# Patient Record
Sex: FEMALE | Race: OTHER | Employment: UNEMPLOYED | URBAN - NONMETROPOLITAN AREA
[De-identification: names, ages, dates, MRNs, and addresses within clinical notes are randomized per-mention and may not be internally consistent; named-entity substitution may affect disease eponyms.]

---

## 2024-09-09 ENCOUNTER — HOSPITAL ENCOUNTER (OUTPATIENT)
Age: 34
Setting detail: OBSERVATION
Discharge: HOME | End: 2024-09-13
Attending: STUDENT IN AN ORGANIZED HEALTH CARE EDUCATION/TRAINING PROGRAM | Admitting: STUDENT IN AN ORGANIZED HEALTH CARE EDUCATION/TRAINING PROGRAM
Payer: COMMERCIAL

## 2024-09-09 DIAGNOSIS — K85.90 RECURRENT ACUTE PANCREATITIS: Primary | ICD-10-CM

## 2024-09-09 DIAGNOSIS — K11.20 PAROTITIS NOT DUE TO MUMPS: ICD-10-CM

## 2024-09-09 ASSESSMENT — PAIN DESCRIPTION - ONSET: ONSET: ON-GOING

## 2024-09-09 ASSESSMENT — PAIN DESCRIPTION - FREQUENCY: FREQUENCY: CONTINUOUS

## 2024-09-09 ASSESSMENT — PAIN SCALES - GENERAL: PAINLEVEL_OUTOF10: 8

## 2024-09-09 ASSESSMENT — PAIN - FUNCTIONAL ASSESSMENT: PAIN_FUNCTIONAL_ASSESSMENT: ACTIVITIES ARE NOT PREVENTED

## 2024-09-09 ASSESSMENT — PAIN DESCRIPTION - PAIN TYPE: TYPE: ACUTE PAIN

## 2024-09-09 ASSESSMENT — PAIN DESCRIPTION - LOCATION: LOCATION: ABDOMEN

## 2024-09-09 ASSESSMENT — PAIN DESCRIPTION - DESCRIPTORS: DESCRIPTORS: ACHING;SHARP

## 2024-09-09 ASSESSMENT — PAIN DESCRIPTION - ORIENTATION: ORIENTATION: LEFT;UPPER;ANTERIOR;POSTERIOR

## 2024-09-10 ENCOUNTER — APPOINTMENT (OUTPATIENT)
Dept: ULTRASOUND IMAGING | Age: 34
End: 2024-09-10
Attending: STUDENT IN AN ORGANIZED HEALTH CARE EDUCATION/TRAINING PROGRAM
Payer: COMMERCIAL

## 2024-09-10 ENCOUNTER — APPOINTMENT (OUTPATIENT)
Dept: MRI IMAGING | Age: 34
End: 2024-09-10
Attending: STUDENT IN AN ORGANIZED HEALTH CARE EDUCATION/TRAINING PROGRAM
Payer: COMMERCIAL

## 2024-09-10 LAB
ALBUMIN SERPL BCG-MCNC: 3.5 G/DL (ref 3.5–5.1)
ALP SERPL-CCNC: 95 U/L (ref 38–126)
ALT SERPL W/O P-5'-P-CCNC: 17 U/L (ref 11–66)
AMPHETAMINES UR QL SCN: NEGATIVE
ANION GAP SERPL CALC-SCNC: 14 MEQ/L (ref 8–16)
AST SERPL-CCNC: 17 U/L (ref 5–40)
BARBITURATES UR QL SCN: NEGATIVE
BENZODIAZ UR QL SCN: NEGATIVE
BILIRUB SERPL-MCNC: 0.6 MG/DL (ref 0.3–1.2)
BILIRUB UR QL STRIP.AUTO: NEGATIVE
BUN SERPL-MCNC: 7 MG/DL (ref 7–22)
BZE UR QL SCN: NEGATIVE
CALCIUM SERPL-MCNC: 8.5 MG/DL (ref 8.5–10.5)
CANNABINOIDS UR QL SCN: NEGATIVE
CHARACTER UR: CLEAR
CHLORIDE SERPL-SCNC: 108 MEQ/L (ref 98–111)
CO2 SERPL-SCNC: 20 MEQ/L (ref 23–33)
COLOR, UA: YELLOW
CREAT SERPL-MCNC: 0.6 MG/DL (ref 0.4–1.2)
DEPRECATED RDW RBC AUTO: 44.9 FL (ref 35–45)
ERYTHROCYTE [DISTWIDTH] IN BLOOD BY AUTOMATED COUNT: 14.1 % (ref 11.5–14.5)
FENTANYL: NEGATIVE
GFR SERPL CREATININE-BSD FRML MDRD: > 90 ML/MIN/1.73M2
GLUCOSE SERPL-MCNC: 76 MG/DL (ref 70–108)
GLUCOSE UR QL STRIP.AUTO: NEGATIVE MG/DL
HCT VFR BLD AUTO: 33.1 % (ref 37–47)
HGB BLD-MCNC: 10.4 GM/DL (ref 12–16)
HGB UR QL STRIP.AUTO: NEGATIVE
KETONES UR QL STRIP.AUTO: 15
LIPASE SERPL-CCNC: 12.8 U/L (ref 5.6–51.3)
MAGNESIUM SERPL-MCNC: 1.8 MG/DL (ref 1.6–2.4)
MCH RBC QN AUTO: 27.7 PG (ref 26–33)
MCHC RBC AUTO-ENTMCNC: 31.4 GM/DL (ref 32.2–35.5)
MCV RBC AUTO: 88.3 FL (ref 81–99)
NITRITE UR QL STRIP: NEGATIVE
OPIATES UR QL SCN: POSITIVE
OXYCODONE: NEGATIVE
PCP UR QL SCN: NEGATIVE
PH UR STRIP.AUTO: 5 [PH] (ref 5–9)
PLATELET # BLD AUTO: 150 THOU/MM3 (ref 130–400)
PMV BLD AUTO: 12.7 FL (ref 9.4–12.4)
POTASSIUM SERPL-SCNC: 3.5 MEQ/L (ref 3.5–5.2)
PROT SERPL-MCNC: 6 G/DL (ref 6.1–8)
PROT UR STRIP.AUTO-MCNC: NEGATIVE MG/DL
RBC # BLD AUTO: 3.75 MILL/MM3 (ref 4.2–5.4)
SODIUM SERPL-SCNC: 142 MEQ/L (ref 135–145)
SP GR UR REFRACT.AUTO: 1.02 (ref 1–1.03)
UROBILINOGEN, URINE: 0.2 EU/DL (ref 0–1)
WBC # BLD AUTO: 3.2 THOU/MM3 (ref 4.8–10.8)
WBC #/AREA URNS HPF: NEGATIVE /[HPF]

## 2024-09-10 PROCEDURE — 6360000002 HC RX W HCPCS

## 2024-09-10 PROCEDURE — 2580000003 HC RX 258: Performed by: NURSE PRACTITIONER

## 2024-09-10 PROCEDURE — C1751 CATH, INF, PER/CENT/MIDLINE: HCPCS

## 2024-09-10 PROCEDURE — 2500000003 HC RX 250 WO HCPCS

## 2024-09-10 PROCEDURE — 80053 COMPREHEN METABOLIC PANEL: CPT

## 2024-09-10 PROCEDURE — A9579 GAD-BASE MR CONTRAST NOS,1ML: HCPCS | Performed by: NURSE PRACTITIONER

## 2024-09-10 PROCEDURE — 96375 TX/PRO/DX INJ NEW DRUG ADDON: CPT

## 2024-09-10 PROCEDURE — 96367 TX/PROPH/DG ADDL SEQ IV INF: CPT

## 2024-09-10 PROCEDURE — 83735 ASSAY OF MAGNESIUM: CPT

## 2024-09-10 PROCEDURE — 99222 1ST HOSP IP/OBS MODERATE 55: CPT

## 2024-09-10 PROCEDURE — 2580000003 HC RX 258: Performed by: PHYSICIAN ASSISTANT

## 2024-09-10 PROCEDURE — 74183 MRI ABD W/O CNTR FLWD CNTR: CPT

## 2024-09-10 PROCEDURE — 36415 COLL VENOUS BLD VENIPUNCTURE: CPT

## 2024-09-10 PROCEDURE — 96365 THER/PROPH/DIAG IV INF INIT: CPT

## 2024-09-10 PROCEDURE — 81003 URINALYSIS AUTO W/O SCOPE: CPT

## 2024-09-10 PROCEDURE — G0378 HOSPITAL OBSERVATION PER HR: HCPCS

## 2024-09-10 PROCEDURE — 96366 THER/PROPH/DIAG IV INF ADDON: CPT

## 2024-09-10 PROCEDURE — 80307 DRUG TEST PRSMV CHEM ANLYZR: CPT

## 2024-09-10 PROCEDURE — 6360000002 HC RX W HCPCS: Performed by: PHYSICIAN ASSISTANT

## 2024-09-10 PROCEDURE — 6360000002 HC RX W HCPCS: Performed by: NURSE PRACTITIONER

## 2024-09-10 PROCEDURE — 36410 VNPNXR 3YR/> PHY/QHP DX/THER: CPT

## 2024-09-10 PROCEDURE — 96376 TX/PRO/DX INJ SAME DRUG ADON: CPT

## 2024-09-10 PROCEDURE — 96361 HYDRATE IV INFUSION ADD-ON: CPT

## 2024-09-10 PROCEDURE — 85027 COMPLETE CBC AUTOMATED: CPT

## 2024-09-10 PROCEDURE — 6360000004 HC RX CONTRAST MEDICATION: Performed by: NURSE PRACTITIONER

## 2024-09-10 PROCEDURE — 83690 ASSAY OF LIPASE: CPT

## 2024-09-10 PROCEDURE — 76937 US GUIDE VASCULAR ACCESS: CPT

## 2024-09-10 PROCEDURE — 2580000003 HC RX 258

## 2024-09-10 PROCEDURE — 76770 US EXAM ABDO BACK WALL COMP: CPT

## 2024-09-10 RX ORDER — MORPHINE SULFATE 2 MG/ML
2 INJECTION, SOLUTION INTRAMUSCULAR; INTRAVENOUS
Status: DISCONTINUED | OUTPATIENT
Start: 2024-09-10 | End: 2024-09-11

## 2024-09-10 RX ORDER — LOPERAMIDE HCL 2 MG
2-4 CAPSULE ORAL AS NEEDED
Status: ON HOLD | COMMUNITY

## 2024-09-10 RX ORDER — ENOXAPARIN SODIUM 100 MG/ML
40 INJECTION SUBCUTANEOUS DAILY
Status: DISCONTINUED | OUTPATIENT
Start: 2024-09-10 | End: 2024-09-10

## 2024-09-10 RX ORDER — MAGNESIUM SULFATE IN WATER 40 MG/ML
2000 INJECTION, SOLUTION INTRAVENOUS PRN
Status: DISCONTINUED | OUTPATIENT
Start: 2024-09-10 | End: 2024-09-13 | Stop reason: HOSPADM

## 2024-09-10 RX ORDER — MORPHINE SULFATE 4 MG/ML
4 INJECTION, SOLUTION INTRAMUSCULAR; INTRAVENOUS
Status: DISCONTINUED | OUTPATIENT
Start: 2024-09-10 | End: 2024-09-11

## 2024-09-10 RX ORDER — MORPHINE SULFATE 2 MG/ML
2 INJECTION, SOLUTION INTRAMUSCULAR; INTRAVENOUS
Status: DISCONTINUED | OUTPATIENT
Start: 2024-09-10 | End: 2024-09-10

## 2024-09-10 RX ORDER — SODIUM CHLORIDE 9 MG/ML
INJECTION, SOLUTION INTRAVENOUS PRN
Status: DISCONTINUED | OUTPATIENT
Start: 2024-09-10 | End: 2024-09-13 | Stop reason: HOSPADM

## 2024-09-10 RX ORDER — PANTOPRAZOLE SODIUM 40 MG/1
40 TABLET, DELAYED RELEASE ORAL DAILY
Status: ON HOLD | COMMUNITY
Start: 2024-06-22

## 2024-09-10 RX ORDER — CIPROFLOXACIN 750 MG/1
750 TABLET, FILM COATED ORAL 2 TIMES DAILY
Status: ON HOLD | COMMUNITY
End: 2024-09-13 | Stop reason: HOSPADM

## 2024-09-10 RX ORDER — POTASSIUM CHLORIDE 1500 MG/1
40 TABLET, EXTENDED RELEASE ORAL PRN
Status: DISCONTINUED | OUTPATIENT
Start: 2024-09-10 | End: 2024-09-13 | Stop reason: HOSPADM

## 2024-09-10 RX ORDER — DIPHENHYDRAMINE HYDROCHLORIDE 50 MG/ML
50 INJECTION INTRAMUSCULAR; INTRAVENOUS EVERY 6 HOURS PRN
Status: DISCONTINUED | OUTPATIENT
Start: 2024-09-10 | End: 2024-09-13 | Stop reason: HOSPADM

## 2024-09-10 RX ORDER — SODIUM CHLORIDE 0.9 % (FLUSH) 0.9 %
5-40 SYRINGE (ML) INJECTION EVERY 12 HOURS SCHEDULED
Status: DISCONTINUED | OUTPATIENT
Start: 2024-09-10 | End: 2024-09-13 | Stop reason: HOSPADM

## 2024-09-10 RX ORDER — DIPHENHYDRAMINE HYDROCHLORIDE 50 MG/ML
25 INJECTION INTRAMUSCULAR; INTRAVENOUS ONCE
Status: COMPLETED | OUTPATIENT
Start: 2024-09-10 | End: 2024-09-10

## 2024-09-10 RX ORDER — SODIUM CHLORIDE, SODIUM LACTATE, POTASSIUM CHLORIDE, CALCIUM CHLORIDE 600; 310; 30; 20 MG/100ML; MG/100ML; MG/100ML; MG/100ML
INJECTION, SOLUTION INTRAVENOUS CONTINUOUS
Status: DISCONTINUED | OUTPATIENT
Start: 2024-09-10 | End: 2024-09-13 | Stop reason: HOSPADM

## 2024-09-10 RX ORDER — MORPHINE SULFATE 4 MG/ML
4 INJECTION, SOLUTION INTRAMUSCULAR; INTRAVENOUS
Status: DISCONTINUED | OUTPATIENT
Start: 2024-09-10 | End: 2024-09-10

## 2024-09-10 RX ORDER — FAMOTIDINE 20 MG/1
20 TABLET, FILM COATED ORAL 2 TIMES DAILY
COMMUNITY
Start: 2024-07-01 | End: 2024-09-18

## 2024-09-10 RX ORDER — LORAZEPAM 2 MG/ML
1 INJECTION INTRAMUSCULAR
Status: COMPLETED | OUTPATIENT
Start: 2024-09-10 | End: 2024-09-10

## 2024-09-10 RX ORDER — DIPHENHYDRAMINE HYDROCHLORIDE 50 MG/ML
50 INJECTION INTRAMUSCULAR; INTRAVENOUS ONCE
Status: COMPLETED | OUTPATIENT
Start: 2024-09-10 | End: 2024-09-10

## 2024-09-10 RX ORDER — SODIUM CHLORIDE 0.9 % (FLUSH) 0.9 %
5-40 SYRINGE (ML) INJECTION PRN
Status: DISCONTINUED | OUTPATIENT
Start: 2024-09-10 | End: 2024-09-11 | Stop reason: HOSPADM

## 2024-09-10 RX ORDER — ONDANSETRON 4 MG/1
4 TABLET, ORALLY DISINTEGRATING ORAL EVERY 8 HOURS PRN
Status: DISCONTINUED | OUTPATIENT
Start: 2024-09-10 | End: 2024-09-13 | Stop reason: HOSPADM

## 2024-09-10 RX ORDER — ACETAMINOPHEN 650 MG/1
650 SUPPOSITORY RECTAL EVERY 6 HOURS PRN
Status: DISCONTINUED | OUTPATIENT
Start: 2024-09-10 | End: 2024-09-13 | Stop reason: HOSPADM

## 2024-09-10 RX ORDER — PANTOPRAZOLE SODIUM 40 MG/10ML
40 INJECTION, POWDER, LYOPHILIZED, FOR SOLUTION INTRAVENOUS 2 TIMES DAILY
Status: DISCONTINUED | OUTPATIENT
Start: 2024-09-10 | End: 2024-09-10

## 2024-09-10 RX ORDER — ACETAMINOPHEN 325 MG/1
650 TABLET ORAL EVERY 6 HOURS PRN
Status: DISCONTINUED | OUTPATIENT
Start: 2024-09-10 | End: 2024-09-13 | Stop reason: HOSPADM

## 2024-09-10 RX ORDER — POTASSIUM CHLORIDE 7.45 MG/ML
10 INJECTION INTRAVENOUS PRN
Status: DISCONTINUED | OUTPATIENT
Start: 2024-09-10 | End: 2024-09-13 | Stop reason: HOSPADM

## 2024-09-10 RX ORDER — SODIUM CHLORIDE 0.9 % (FLUSH) 0.9 %
5-40 SYRINGE (ML) INJECTION PRN
Status: DISCONTINUED | OUTPATIENT
Start: 2024-09-10 | End: 2024-09-13 | Stop reason: HOSPADM

## 2024-09-10 RX ORDER — POLYETHYLENE GLYCOL 3350 17 G/17G
17 POWDER, FOR SOLUTION ORAL DAILY PRN
Status: DISCONTINUED | OUTPATIENT
Start: 2024-09-10 | End: 2024-09-13 | Stop reason: HOSPADM

## 2024-09-10 RX ORDER — ONDANSETRON 2 MG/ML
4 INJECTION INTRAMUSCULAR; INTRAVENOUS EVERY 6 HOURS PRN
Status: DISCONTINUED | OUTPATIENT
Start: 2024-09-10 | End: 2024-09-13 | Stop reason: HOSPADM

## 2024-09-10 RX ADMIN — DIPHENHYDRAMINE HYDROCHLORIDE 50 MG: 50 INJECTION INTRAMUSCULAR; INTRAVENOUS at 02:37

## 2024-09-10 RX ADMIN — MORPHINE SULFATE 4 MG: 4 INJECTION, SOLUTION INTRAMUSCULAR; INTRAVENOUS at 21:24

## 2024-09-10 RX ADMIN — DIPHENHYDRAMINE HYDROCHLORIDE 50 MG: 50 INJECTION INTRAMUSCULAR; INTRAVENOUS at 20:04

## 2024-09-10 RX ADMIN — DIPHENHYDRAMINE HYDROCHLORIDE 50 MG: 50 INJECTION INTRAMUSCULAR; INTRAVENOUS at 13:36

## 2024-09-10 RX ADMIN — SODIUM CHLORIDE, POTASSIUM CHLORIDE, SODIUM LACTATE AND CALCIUM CHLORIDE: 600; 310; 30; 20 INJECTION, SOLUTION INTRAVENOUS at 10:16

## 2024-09-10 RX ADMIN — DIPHENHYDRAMINE HYDROCHLORIDE 25 MG: 50 INJECTION INTRAMUSCULAR; INTRAVENOUS at 06:51

## 2024-09-10 RX ADMIN — ONDANSETRON 4 MG: 2 INJECTION INTRAMUSCULAR; INTRAVENOUS at 19:59

## 2024-09-10 RX ADMIN — SODIUM CHLORIDE, POTASSIUM CHLORIDE, SODIUM LACTATE AND CALCIUM CHLORIDE: 600; 310; 30; 20 INJECTION, SOLUTION INTRAVENOUS at 23:32

## 2024-09-10 RX ADMIN — PANTOPRAZOLE SODIUM 40 MG: 40 INJECTION, POWDER, FOR SOLUTION INTRAVENOUS at 09:14

## 2024-09-10 RX ADMIN — SODIUM CHLORIDE, PRESERVATIVE FREE 20 MG: 5 INJECTION INTRAVENOUS at 09:14

## 2024-09-10 RX ADMIN — GADOTERIDOL 15 ML: 279.3 INJECTION, SOLUTION INTRAVENOUS at 13:04

## 2024-09-10 RX ADMIN — SODIUM CHLORIDE, POTASSIUM CHLORIDE, SODIUM LACTATE AND CALCIUM CHLORIDE: 600; 310; 30; 20 INJECTION, SOLUTION INTRAVENOUS at 03:38

## 2024-09-10 RX ADMIN — CEFTRIAXONE SODIUM 1000 MG: 1 INJECTION, POWDER, FOR SOLUTION INTRAMUSCULAR; INTRAVENOUS at 02:55

## 2024-09-10 RX ADMIN — MORPHINE SULFATE 2 MG: 2 INJECTION, SOLUTION INTRAMUSCULAR; INTRAVENOUS at 09:14

## 2024-09-10 RX ADMIN — PANTOPRAZOLE SODIUM 8 MG/HR: 40 INJECTION, POWDER, FOR SOLUTION INTRAVENOUS at 23:36

## 2024-09-10 RX ADMIN — SODIUM CHLORIDE, POTASSIUM CHLORIDE, SODIUM LACTATE AND CALCIUM CHLORIDE: 600; 310; 30; 20 INJECTION, SOLUTION INTRAVENOUS at 19:30

## 2024-09-10 RX ADMIN — PANTOPRAZOLE SODIUM 40 MG: 40 INJECTION, POWDER, FOR SOLUTION INTRAVENOUS at 02:47

## 2024-09-10 RX ADMIN — LORAZEPAM 1 MG: 2 INJECTION INTRAMUSCULAR; INTRAVENOUS at 11:57

## 2024-09-10 RX ADMIN — MORPHINE SULFATE 4 MG: 4 INJECTION, SOLUTION INTRAMUSCULAR; INTRAVENOUS at 02:43

## 2024-09-10 RX ADMIN — MORPHINE SULFATE 4 MG: 4 INJECTION, SOLUTION INTRAMUSCULAR; INTRAVENOUS at 06:05

## 2024-09-10 RX ADMIN — ONDANSETRON 4 MG: 2 INJECTION INTRAMUSCULAR; INTRAVENOUS at 13:36

## 2024-09-10 RX ADMIN — MORPHINE SULFATE 4 MG: 4 INJECTION, SOLUTION INTRAMUSCULAR; INTRAVENOUS at 18:06

## 2024-09-10 RX ADMIN — SODIUM CHLORIDE, POTASSIUM CHLORIDE, SODIUM LACTATE AND CALCIUM CHLORIDE: 600; 310; 30; 20 INJECTION, SOLUTION INTRAVENOUS at 15:21

## 2024-09-10 RX ADMIN — PANTOPRAZOLE SODIUM 8 MG/HR: 40 INJECTION, POWDER, FOR SOLUTION INTRAVENOUS at 10:33

## 2024-09-10 RX ADMIN — MORPHINE SULFATE 4 MG: 4 INJECTION, SOLUTION INTRAMUSCULAR; INTRAVENOUS at 15:19

## 2024-09-10 ASSESSMENT — PAIN DESCRIPTION - FREQUENCY
FREQUENCY: INTERMITTENT
FREQUENCY: CONTINUOUS
FREQUENCY: INTERMITTENT
FREQUENCY: CONTINUOUS

## 2024-09-10 ASSESSMENT — PAIN DESCRIPTION - ORIENTATION
ORIENTATION: RIGHT;ANTERIOR;POSTERIOR;UPPER
ORIENTATION: RIGHT;UPPER;ANTERIOR;POSTERIOR
ORIENTATION: MID;UPPER
ORIENTATION: RIGHT;ANTERIOR;POSTERIOR;UPPER

## 2024-09-10 ASSESSMENT — ENCOUNTER SYMPTOMS
PHOTOPHOBIA: 0
TROUBLE SWALLOWING: 0
VOMITING: 1
COUGH: 0
BLOOD IN STOOL: 0
SHORTNESS OF BREATH: 0
NAUSEA: 1
ABDOMINAL PAIN: 1
BACK PAIN: 0

## 2024-09-10 ASSESSMENT — PAIN DESCRIPTION - DESCRIPTORS
DESCRIPTORS: ACHING;SHARP
DESCRIPTORS: ACHING;STABBING
DESCRIPTORS: SHARP
DESCRIPTORS: ACHING;SHARP
DESCRIPTORS: SHARP

## 2024-09-10 ASSESSMENT — PAIN DESCRIPTION - LOCATION
LOCATION: ABDOMEN

## 2024-09-10 ASSESSMENT — PAIN DESCRIPTION - PAIN TYPE
TYPE: ACUTE PAIN

## 2024-09-10 ASSESSMENT — PAIN SCALES - GENERAL
PAINLEVEL_OUTOF10: 8
PAINLEVEL_OUTOF10: 7
PAINLEVEL_OUTOF10: 5
PAINLEVEL_OUTOF10: 8
PAINLEVEL_OUTOF10: 8
PAINLEVEL_OUTOF10: 6
PAINLEVEL_OUTOF10: 8
PAINLEVEL_OUTOF10: 8
PAINLEVEL_OUTOF10: 9
PAINLEVEL_OUTOF10: 5
PAINLEVEL_OUTOF10: 9
PAINLEVEL_OUTOF10: 6
PAINLEVEL_OUTOF10: 8
PAINLEVEL_OUTOF10: 6

## 2024-09-10 ASSESSMENT — PAIN DESCRIPTION - ONSET
ONSET: ON-GOING

## 2024-09-10 ASSESSMENT — PAIN - FUNCTIONAL ASSESSMENT
PAIN_FUNCTIONAL_ASSESSMENT: ACTIVITIES ARE NOT PREVENTED

## 2024-09-10 ASSESSMENT — LIFESTYLE VARIABLES
HOW OFTEN DO YOU HAVE A DRINK CONTAINING ALCOHOL: NEVER
HOW MANY STANDARD DRINKS CONTAINING ALCOHOL DO YOU HAVE ON A TYPICAL DAY: PATIENT DOES NOT DRINK

## 2024-09-11 ENCOUNTER — ANESTHESIA (OUTPATIENT)
Dept: ENDOSCOPY | Age: 34
End: 2024-09-11
Payer: COMMERCIAL

## 2024-09-11 ENCOUNTER — ANESTHESIA EVENT (OUTPATIENT)
Dept: ENDOSCOPY | Age: 34
End: 2024-09-11
Payer: COMMERCIAL

## 2024-09-11 LAB
ALBUMIN SERPL BCG-MCNC: 3.5 G/DL (ref 3.5–5.1)
ALP SERPL-CCNC: 101 U/L (ref 38–126)
ALT SERPL W/O P-5'-P-CCNC: 17 U/L (ref 11–66)
ANION GAP SERPL CALC-SCNC: 12 MEQ/L (ref 8–16)
AST SERPL-CCNC: 18 U/L (ref 5–40)
BASOPHILS ABSOLUTE: 0 THOU/MM3 (ref 0–0.1)
BASOPHILS NFR BLD AUTO: 0.6 %
BILIRUB SERPL-MCNC: 0.6 MG/DL (ref 0.3–1.2)
BUN SERPL-MCNC: 5 MG/DL (ref 7–22)
CALCIUM SERPL-MCNC: 8.6 MG/DL (ref 8.5–10.5)
CHLORIDE SERPL-SCNC: 105 MEQ/L (ref 98–111)
CO2 SERPL-SCNC: 24 MEQ/L (ref 23–33)
CREAT SERPL-MCNC: 0.6 MG/DL (ref 0.4–1.2)
DEPRECATED RDW RBC AUTO: 42.3 FL (ref 35–45)
EOSINOPHIL NFR BLD AUTO: 1.9 %
EOSINOPHILS ABSOLUTE: 0.1 THOU/MM3 (ref 0–0.4)
ERYTHROCYTE [DISTWIDTH] IN BLOOD BY AUTOMATED COUNT: 13.8 % (ref 11.5–14.5)
GFR SERPL CREATININE-BSD FRML MDRD: > 90 ML/MIN/1.73M2
GLUCOSE SERPL-MCNC: 73 MG/DL (ref 70–108)
HCT VFR BLD AUTO: 31.1 % (ref 37–47)
HGB BLD-MCNC: 10.1 GM/DL (ref 12–16)
IMM GRANULOCYTES # BLD AUTO: 0 THOU/MM3 (ref 0–0.07)
IMM GRANULOCYTES NFR BLD AUTO: 0 %
LYMPHOCYTES ABSOLUTE: 1.5 THOU/MM3 (ref 1–4.8)
LYMPHOCYTES NFR BLD AUTO: 46.8 %
MCH RBC QN AUTO: 27.6 PG (ref 26–33)
MCHC RBC AUTO-ENTMCNC: 32.5 GM/DL (ref 32.2–35.5)
MCV RBC AUTO: 85 FL (ref 81–99)
MONOCYTES ABSOLUTE: 0.3 THOU/MM3 (ref 0.4–1.3)
MONOCYTES NFR BLD AUTO: 10.1 %
NEUTROPHILS ABSOLUTE: 1.3 THOU/MM3 (ref 1.8–7.7)
NEUTROPHILS NFR BLD AUTO: 40.6 %
NRBC BLD AUTO-RTO: 0 /100 WBC
PLATELET # BLD AUTO: 146 THOU/MM3 (ref 130–400)
PMV BLD AUTO: 12.4 FL (ref 9.4–12.4)
POTASSIUM SERPL-SCNC: 3.9 MEQ/L (ref 3.5–5.2)
PREGNANCY, URINE: NEGATIVE
PROT SERPL-MCNC: 5.9 G/DL (ref 6.1–8)
RBC # BLD AUTO: 3.66 MILL/MM3 (ref 4.2–5.4)
SODIUM SERPL-SCNC: 141 MEQ/L (ref 135–145)
WBC # BLD AUTO: 3.1 THOU/MM3 (ref 4.8–10.8)

## 2024-09-11 PROCEDURE — 2500000003 HC RX 250 WO HCPCS

## 2024-09-11 PROCEDURE — 6360000002 HC RX W HCPCS: Performed by: PHYSICIAN ASSISTANT

## 2024-09-11 PROCEDURE — 2580000003 HC RX 258

## 2024-09-11 PROCEDURE — 2580000003 HC RX 258: Performed by: NURSE PRACTITIONER

## 2024-09-11 PROCEDURE — 80053 COMPREHEN METABOLIC PANEL: CPT

## 2024-09-11 PROCEDURE — 96376 TX/PRO/DX INJ SAME DRUG ADON: CPT

## 2024-09-11 PROCEDURE — 96368 THER/DIAG CONCURRENT INF: CPT

## 2024-09-11 PROCEDURE — 3700000001 HC ADD 15 MINUTES (ANESTHESIA): Performed by: INTERNAL MEDICINE

## 2024-09-11 PROCEDURE — 3700000000 HC ANESTHESIA ATTENDED CARE: Performed by: INTERNAL MEDICINE

## 2024-09-11 PROCEDURE — G0378 HOSPITAL OBSERVATION PER HR: HCPCS

## 2024-09-11 PROCEDURE — 6360000002 HC RX W HCPCS

## 2024-09-11 PROCEDURE — 85025 COMPLETE CBC W/AUTO DIFF WBC: CPT

## 2024-09-11 PROCEDURE — 7100000010 HC PHASE II RECOVERY - FIRST 15 MIN: Performed by: INTERNAL MEDICINE

## 2024-09-11 PROCEDURE — 3609012400 HC EGD TRANSORAL BIOPSY SINGLE/MULTIPLE: Performed by: INTERNAL MEDICINE

## 2024-09-11 PROCEDURE — 7100000011 HC PHASE II RECOVERY - ADDTL 15 MIN: Performed by: INTERNAL MEDICINE

## 2024-09-11 PROCEDURE — 6360000002 HC RX W HCPCS: Performed by: NURSE PRACTITIONER

## 2024-09-11 PROCEDURE — 99233 SBSQ HOSP IP/OBS HIGH 50: CPT | Performed by: PHYSICIAN ASSISTANT

## 2024-09-11 PROCEDURE — 6360000002 HC RX W HCPCS: Performed by: NURSE ANESTHETIST, CERTIFIED REGISTERED

## 2024-09-11 PROCEDURE — 2580000003 HC RX 258: Performed by: PHYSICIAN ASSISTANT

## 2024-09-11 PROCEDURE — 81025 URINE PREGNANCY TEST: CPT

## 2024-09-11 PROCEDURE — 88305 TISSUE EXAM BY PATHOLOGIST: CPT

## 2024-09-11 PROCEDURE — 96366 THER/PROPH/DIAG IV INF ADDON: CPT

## 2024-09-11 PROCEDURE — 36415 COLL VENOUS BLD VENIPUNCTURE: CPT

## 2024-09-11 RX ORDER — LIDOCAINE HYDROCHLORIDE 20 MG/ML
INJECTION, SOLUTION INTRAVENOUS PRN
Status: DISCONTINUED | OUTPATIENT
Start: 2024-09-11 | End: 2024-09-11 | Stop reason: SDUPTHER

## 2024-09-11 RX ORDER — CIPROFLOXACIN 2 MG/ML
400 INJECTION, SOLUTION INTRAVENOUS ONCE
Status: DISCONTINUED | OUTPATIENT
Start: 2024-09-12 | End: 2024-09-12

## 2024-09-11 RX ORDER — PROPOFOL 10 MG/ML
INJECTION, EMULSION INTRAVENOUS PRN
Status: DISCONTINUED | OUTPATIENT
Start: 2024-09-11 | End: 2024-09-11 | Stop reason: SDUPTHER

## 2024-09-11 RX ORDER — MORPHINE SULFATE 2 MG/ML
2 INJECTION, SOLUTION INTRAMUSCULAR; INTRAVENOUS
Status: DISCONTINUED | OUTPATIENT
Start: 2024-09-11 | End: 2024-09-13 | Stop reason: HOSPADM

## 2024-09-11 RX ORDER — SODIUM CHLORIDE 0.9 % (FLUSH) 0.9 %
5-40 SYRINGE (ML) INJECTION PRN
Status: DISCONTINUED | OUTPATIENT
Start: 2024-09-11 | End: 2024-09-12 | Stop reason: HOSPADM

## 2024-09-11 RX ORDER — METRONIDAZOLE 500 MG/100ML
500 INJECTION, SOLUTION INTRAVENOUS ONCE
Status: COMPLETED | OUTPATIENT
Start: 2024-09-12 | End: 2024-09-12

## 2024-09-11 RX ORDER — MORPHINE SULFATE 2 MG/ML
1 INJECTION, SOLUTION INTRAMUSCULAR; INTRAVENOUS
Status: DISCONTINUED | OUTPATIENT
Start: 2024-09-11 | End: 2024-09-13 | Stop reason: HOSPADM

## 2024-09-11 RX ADMIN — SODIUM CHLORIDE, POTASSIUM CHLORIDE, SODIUM LACTATE AND CALCIUM CHLORIDE: 600; 310; 30; 20 INJECTION, SOLUTION INTRAVENOUS at 03:57

## 2024-09-11 RX ADMIN — SODIUM CHLORIDE, POTASSIUM CHLORIDE, SODIUM LACTATE AND CALCIUM CHLORIDE: 600; 310; 30; 20 INJECTION, SOLUTION INTRAVENOUS at 12:52

## 2024-09-11 RX ADMIN — MORPHINE SULFATE 2 MG: 2 INJECTION, SOLUTION INTRAMUSCULAR; INTRAVENOUS at 15:57

## 2024-09-11 RX ADMIN — MORPHINE SULFATE 4 MG: 4 INJECTION, SOLUTION INTRAMUSCULAR; INTRAVENOUS at 00:34

## 2024-09-11 RX ADMIN — PANTOPRAZOLE SODIUM 8 MG/HR: 40 INJECTION, POWDER, FOR SOLUTION INTRAVENOUS at 09:21

## 2024-09-11 RX ADMIN — SODIUM CHLORIDE, PRESERVATIVE FREE 10 ML: 5 INJECTION INTRAVENOUS at 08:00

## 2024-09-11 RX ADMIN — SODIUM CHLORIDE, PRESERVATIVE FREE 20 MG: 5 INJECTION INTRAVENOUS at 07:56

## 2024-09-11 RX ADMIN — MORPHINE SULFATE 4 MG: 4 INJECTION, SOLUTION INTRAMUSCULAR; INTRAVENOUS at 06:51

## 2024-09-11 RX ADMIN — SODIUM CHLORIDE, POTASSIUM CHLORIDE, SODIUM LACTATE AND CALCIUM CHLORIDE: 600; 310; 30; 20 INJECTION, SOLUTION INTRAVENOUS at 17:48

## 2024-09-11 RX ADMIN — CEFTRIAXONE SODIUM 1000 MG: 1 INJECTION, POWDER, FOR SOLUTION INTRAMUSCULAR; INTRAVENOUS at 03:04

## 2024-09-11 RX ADMIN — SODIUM CHLORIDE, POTASSIUM CHLORIDE, SODIUM LACTATE AND CALCIUM CHLORIDE: 600; 310; 30; 20 INJECTION, SOLUTION INTRAVENOUS at 07:55

## 2024-09-11 RX ADMIN — MORPHINE SULFATE 4 MG: 4 INJECTION, SOLUTION INTRAMUSCULAR; INTRAVENOUS at 03:41

## 2024-09-11 RX ADMIN — DIPHENHYDRAMINE HYDROCHLORIDE 50 MG: 50 INJECTION INTRAMUSCULAR; INTRAVENOUS at 02:05

## 2024-09-11 RX ADMIN — MORPHINE SULFATE 2 MG: 2 INJECTION, SOLUTION INTRAMUSCULAR; INTRAVENOUS at 18:52

## 2024-09-11 RX ADMIN — LIDOCAINE HYDROCHLORIDE 100 MG: 20 INJECTION, SOLUTION INTRAVENOUS at 11:02

## 2024-09-11 RX ADMIN — MORPHINE SULFATE 2 MG: 2 INJECTION, SOLUTION INTRAMUSCULAR; INTRAVENOUS at 09:55

## 2024-09-11 RX ADMIN — DIPHENHYDRAMINE HYDROCHLORIDE 50 MG: 50 INJECTION INTRAMUSCULAR; INTRAVENOUS at 20:03

## 2024-09-11 RX ADMIN — DIPHENHYDRAMINE HYDROCHLORIDE 50 MG: 50 INJECTION INTRAMUSCULAR; INTRAVENOUS at 07:59

## 2024-09-11 RX ADMIN — MORPHINE SULFATE 2 MG: 2 INJECTION, SOLUTION INTRAMUSCULAR; INTRAVENOUS at 22:01

## 2024-09-11 RX ADMIN — PROPOFOL 20 MG: 10 INJECTION, EMULSION INTRAVENOUS at 11:04

## 2024-09-11 RX ADMIN — MORPHINE SULFATE 2 MG: 2 INJECTION, SOLUTION INTRAMUSCULAR; INTRAVENOUS at 12:55

## 2024-09-11 RX ADMIN — PANTOPRAZOLE SODIUM 8 MG/HR: 40 INJECTION, POWDER, FOR SOLUTION INTRAVENOUS at 21:33

## 2024-09-11 RX ADMIN — PROPOFOL 50 MG: 10 INJECTION, EMULSION INTRAVENOUS at 11:02

## 2024-09-11 RX ADMIN — DIPHENHYDRAMINE HYDROCHLORIDE 50 MG: 50 INJECTION INTRAMUSCULAR; INTRAVENOUS at 14:03

## 2024-09-11 ASSESSMENT — PAIN DESCRIPTION - LOCATION
LOCATION: ABDOMEN

## 2024-09-11 ASSESSMENT — PAIN SCALES - GENERAL
PAINLEVEL_OUTOF10: 6
PAINLEVEL_OUTOF10: 7
PAINLEVEL_OUTOF10: 7
PAINLEVEL_OUTOF10: 5
PAINLEVEL_OUTOF10: 8
PAINLEVEL_OUTOF10: 8
PAINLEVEL_OUTOF10: 6
PAINLEVEL_OUTOF10: 7
PAINLEVEL_OUTOF10: 8
PAINLEVEL_OUTOF10: 6
PAINLEVEL_OUTOF10: 8
PAINLEVEL_OUTOF10: 9
PAINLEVEL_OUTOF10: 8
PAINLEVEL_OUTOF10: 8
PAINLEVEL_OUTOF10: 6
PAINLEVEL_OUTOF10: 5

## 2024-09-11 ASSESSMENT — PAIN DESCRIPTION - DESCRIPTORS
DESCRIPTORS: SHARP
DESCRIPTORS: ACHING;SHARP
DESCRIPTORS: SHARP
DESCRIPTORS: SHARP
DESCRIPTORS: SORE;ACHING
DESCRIPTORS: ACHING;STABBING
DESCRIPTORS: SHARP
DESCRIPTORS: ACHING;SHARP

## 2024-09-11 ASSESSMENT — PAIN DESCRIPTION - ORIENTATION
ORIENTATION: MID;UPPER
ORIENTATION: RIGHT;UPPER
ORIENTATION: ANTERIOR;POSTERIOR;MID
ORIENTATION: MID
ORIENTATION: UPPER;MID
ORIENTATION: RIGHT

## 2024-09-11 ASSESSMENT — PAIN - FUNCTIONAL ASSESSMENT
PAIN_FUNCTIONAL_ASSESSMENT: ACTIVITIES ARE NOT PREVENTED
PAIN_FUNCTIONAL_ASSESSMENT: ACTIVITIES ARE NOT PREVENTED
PAIN_FUNCTIONAL_ASSESSMENT: 0-10
PAIN_FUNCTIONAL_ASSESSMENT: 0-10
PAIN_FUNCTIONAL_ASSESSMENT: ACTIVITIES ARE NOT PREVENTED

## 2024-09-11 ASSESSMENT — PAIN DESCRIPTION - ONSET: ONSET: ON-GOING

## 2024-09-11 ASSESSMENT — PAIN DESCRIPTION - FREQUENCY: FREQUENCY: INTERMITTENT

## 2024-09-11 ASSESSMENT — PAIN DESCRIPTION - PAIN TYPE: TYPE: ACUTE PAIN

## 2024-09-12 ENCOUNTER — APPOINTMENT (OUTPATIENT)
Dept: CT IMAGING | Age: 34
End: 2024-09-12
Attending: STUDENT IN AN ORGANIZED HEALTH CARE EDUCATION/TRAINING PROGRAM
Payer: COMMERCIAL

## 2024-09-12 ENCOUNTER — ANESTHESIA EVENT (OUTPATIENT)
Dept: ENDOSCOPY | Age: 34
End: 2024-09-12
Payer: COMMERCIAL

## 2024-09-12 ENCOUNTER — ANESTHESIA (OUTPATIENT)
Dept: ENDOSCOPY | Age: 34
End: 2024-09-12
Payer: COMMERCIAL

## 2024-09-12 LAB
BASOPHILS ABSOLUTE: 0 THOU/MM3 (ref 0–0.1)
BASOPHILS NFR BLD AUTO: 0.8 %
C CAYETANENSIS DNA SPEC QL NAA+PROBE: NOT DETECTED
C DIFF GDH STL QL: NEGATIVE
CAMPY SP DNA.DIARRHEA STL QL NAA+PROBE: NOT DETECTED
CRYPTOSP DNA SPEC QL NAA+PROBE: NOT DETECTED
DEPRECATED RDW RBC AUTO: 43.4 FL (ref 35–45)
E COLI O157H7 DNA SPEC QL NAA+PROBE: NORMAL
E HISTOLYT DNA SPEC QL NAA+PROBE: NOT DETECTED
EAEC PAA PLAS AGGR+AATA ST NAA+NON-PRB: NOT DETECTED
EC STX1+STX2 + H7 FLIC SPEC NAA+PROBE: NOT DETECTED
EOSINOPHIL NFR BLD AUTO: 1.6 %
EOSINOPHILS ABSOLUTE: 0 THOU/MM3 (ref 0–0.4)
EPEC EAE GENE STL QL NAA+NON-PROBE: NORMAL
ERYTHROCYTE [DISTWIDTH] IN BLOOD BY AUTOMATED COUNT: 14.1 % (ref 11.5–14.5)
ETEC LTA+ST1A+ST1B TOX ST NAA+NON-PROBE: NOT DETECTED
G LAMBLIA DNA SPEC QL NAA+PROBE: NOT DETECTED
HADV DNA SPEC QL NAA+PROBE: NOT DETECTED
HASTV RNA SPEC QL NAA+PROBE: NOT DETECTED
HCT VFR BLD AUTO: 30.8 % (ref 37–47)
HGB BLD-MCNC: 10 GM/DL (ref 12–16)
IMM GRANULOCYTES # BLD AUTO: 0 THOU/MM3 (ref 0–0.07)
IMM GRANULOCYTES NFR BLD AUTO: 0 %
LYMPHOCYTES ABSOLUTE: 1.1 THOU/MM3 (ref 1–4.8)
LYMPHOCYTES NFR BLD AUTO: 41.6 %
MCH RBC QN AUTO: 27.9 PG (ref 26–33)
MCHC RBC AUTO-ENTMCNC: 32.5 GM/DL (ref 32.2–35.5)
MCV RBC AUTO: 86 FL (ref 81–99)
MONOCYTES ABSOLUTE: 0.3 THOU/MM3 (ref 0.4–1.3)
MONOCYTES NFR BLD AUTO: 10.6 %
NEUTROPHILS ABSOLUTE: 1.2 THOU/MM3 (ref 1.8–7.7)
NEUTROPHILS NFR BLD AUTO: 45.4 %
NOROVIRUS GI + GII RNA STL NAA+PROBE: NOT DETECTED
NRBC BLD AUTO-RTO: 0 /100 WBC
P SHIGELLOIDES DNA STL QL NAA+PROBE: NOT DETECTED
PLATELET # BLD AUTO: 135 THOU/MM3 (ref 130–400)
PMV BLD AUTO: 12.3 FL (ref 9.4–12.4)
RBC # BLD AUTO: 3.58 MILL/MM3 (ref 4.2–5.4)
RV RNA SPEC QL NAA+PROBE: NOT DETECTED
SALMONELLA DNA SPEC QL NAA+PROBE: NOT DETECTED
SAPOVIRUS RNA SPEC QL NAA+PROBE: NOT DETECTED
SHIGELLA SP+EIEC IPAH ST NAA+NON-PROBE: NOT DETECTED
V CHOLERAE DNA SPEC QL NAA+PROBE: NOT DETECTED
VIBRIO DNA SPEC NAA+PROBE: NOT DETECTED
WBC # BLD AUTO: 2.6 THOU/MM3 (ref 4.8–10.8)
Y ENTERO RECN STL QL NAA+PROBE: NOT DETECTED

## 2024-09-12 PROCEDURE — 6360000002 HC RX W HCPCS: Performed by: PHYSICIAN ASSISTANT

## 2024-09-12 PROCEDURE — G0378 HOSPITAL OBSERVATION PER HR: HCPCS

## 2024-09-12 PROCEDURE — 2580000003 HC RX 258: Performed by: NURSE PRACTITIONER

## 2024-09-12 PROCEDURE — 6360000002 HC RX W HCPCS: Performed by: NURSE PRACTITIONER

## 2024-09-12 PROCEDURE — 96376 TX/PRO/DX INJ SAME DRUG ADON: CPT

## 2024-09-12 PROCEDURE — 87449 NOS EACH ORGANISM AG IA: CPT

## 2024-09-12 PROCEDURE — 85025 COMPLETE CBC W/AUTO DIFF WBC: CPT

## 2024-09-12 PROCEDURE — 70491 CT SOFT TISSUE NECK W/DYE: CPT

## 2024-09-12 PROCEDURE — 3700000000 HC ANESTHESIA ATTENDED CARE: Performed by: INTERNAL MEDICINE

## 2024-09-12 PROCEDURE — 2500000003 HC RX 250 WO HCPCS

## 2024-09-12 PROCEDURE — 6360000004 HC RX CONTRAST MEDICATION

## 2024-09-12 PROCEDURE — 7100000010 HC PHASE II RECOVERY - FIRST 15 MIN: Performed by: INTERNAL MEDICINE

## 2024-09-12 PROCEDURE — 2580000003 HC RX 258

## 2024-09-12 PROCEDURE — 6360000002 HC RX W HCPCS

## 2024-09-12 PROCEDURE — 99232 SBSQ HOSP IP/OBS MODERATE 35: CPT | Performed by: INTERNAL MEDICINE

## 2024-09-12 PROCEDURE — 88305 TISSUE EXAM BY PATHOLOGIST: CPT

## 2024-09-12 PROCEDURE — 6360000002 HC RX W HCPCS: Performed by: REGISTERED NURSE

## 2024-09-12 PROCEDURE — 96366 THER/PROPH/DIAG IV INF ADDON: CPT

## 2024-09-12 PROCEDURE — 83993 ASSAY FOR CALPROTECTIN FECAL: CPT

## 2024-09-12 PROCEDURE — 2709999900 HC NON-CHARGEABLE SUPPLY: Performed by: INTERNAL MEDICINE

## 2024-09-12 PROCEDURE — 3700000001 HC ADD 15 MINUTES (ANESTHESIA): Performed by: INTERNAL MEDICINE

## 2024-09-12 PROCEDURE — 87338 HPYLORI STOOL AG IA: CPT

## 2024-09-12 PROCEDURE — 3609018500 HC EGD US SCOPE W/ADJACENT STRUCTURES: Performed by: INTERNAL MEDICINE

## 2024-09-12 PROCEDURE — 87507 IADNA-DNA/RNA PROBE TQ 12-25: CPT

## 2024-09-12 RX ORDER — BUPIVACAINE HYDROCHLORIDE 5 MG/ML
30 INJECTION, SOLUTION EPIDURAL; INTRACAUDAL ONCE
Status: COMPLETED | OUTPATIENT
Start: 2024-09-12 | End: 2024-09-12

## 2024-09-12 RX ORDER — SODIUM CHLORIDE 0.9 % (FLUSH) 0.9 %
5-40 SYRINGE (ML) INJECTION PRN
Status: DISCONTINUED | OUTPATIENT
Start: 2024-09-12 | End: 2024-09-13 | Stop reason: HOSPADM

## 2024-09-12 RX ORDER — PROPOFOL 10 MG/ML
INJECTION, EMULSION INTRAVENOUS
Status: DISCONTINUED | OUTPATIENT
Start: 2024-09-12 | End: 2024-09-12 | Stop reason: SDUPTHER

## 2024-09-12 RX ORDER — CIPROFLOXACIN 2 MG/ML
200 INJECTION, SOLUTION INTRAVENOUS
Status: COMPLETED | OUTPATIENT
Start: 2024-09-12 | End: 2024-09-12

## 2024-09-12 RX ORDER — IOPAMIDOL 755 MG/ML
80 INJECTION, SOLUTION INTRAVASCULAR
Status: COMPLETED | OUTPATIENT
Start: 2024-09-12 | End: 2024-09-12

## 2024-09-12 RX ADMIN — MORPHINE SULFATE 2 MG: 2 INJECTION, SOLUTION INTRAMUSCULAR; INTRAVENOUS at 22:01

## 2024-09-12 RX ADMIN — PANTOPRAZOLE SODIUM 8 MG/HR: 40 INJECTION, POWDER, FOR SOLUTION INTRAVENOUS at 09:10

## 2024-09-12 RX ADMIN — SODIUM CHLORIDE, POTASSIUM CHLORIDE, SODIUM LACTATE AND CALCIUM CHLORIDE: 600; 310; 30; 20 INJECTION, SOLUTION INTRAVENOUS at 07:55

## 2024-09-12 RX ADMIN — DIPHENHYDRAMINE HYDROCHLORIDE 50 MG: 50 INJECTION INTRAMUSCULAR; INTRAVENOUS at 08:45

## 2024-09-12 RX ADMIN — CIPROFLOXACIN 200 MG: 2 INJECTION, SOLUTION INTRAVENOUS at 14:06

## 2024-09-12 RX ADMIN — ONDANSETRON 4 MG: 2 INJECTION INTRAMUSCULAR; INTRAVENOUS at 10:35

## 2024-09-12 RX ADMIN — MORPHINE SULFATE 2 MG: 2 INJECTION, SOLUTION INTRAMUSCULAR; INTRAVENOUS at 13:46

## 2024-09-12 RX ADMIN — DIPHENHYDRAMINE HYDROCHLORIDE 50 MG: 50 INJECTION INTRAMUSCULAR; INTRAVENOUS at 02:46

## 2024-09-12 RX ADMIN — PROPOFOL 50 MG: 10 INJECTION, EMULSION INTRAVENOUS at 16:28

## 2024-09-12 RX ADMIN — PROPOFOL 50 MG: 10 INJECTION, EMULSION INTRAVENOUS at 16:14

## 2024-09-12 RX ADMIN — PROPOFOL 50 MG: 10 INJECTION, EMULSION INTRAVENOUS at 16:18

## 2024-09-12 RX ADMIN — IOPAMIDOL 80 ML: 755 INJECTION, SOLUTION INTRAVENOUS at 23:21

## 2024-09-12 RX ADMIN — METRONIDAZOLE 500 MG: 500 INJECTION, SOLUTION INTRAVENOUS at 13:01

## 2024-09-12 RX ADMIN — SODIUM CHLORIDE, PRESERVATIVE FREE 20 MG: 5 INJECTION INTRAVENOUS at 07:46

## 2024-09-12 RX ADMIN — PROPOFOL 50 MG: 10 INJECTION, EMULSION INTRAVENOUS at 16:37

## 2024-09-12 RX ADMIN — MORPHINE SULFATE 2 MG: 2 INJECTION, SOLUTION INTRAMUSCULAR; INTRAVENOUS at 01:47

## 2024-09-12 RX ADMIN — PROPOFOL 50 MG: 10 INJECTION, EMULSION INTRAVENOUS at 16:09

## 2024-09-12 RX ADMIN — PROPOFOL 50 MG: 10 INJECTION, EMULSION INTRAVENOUS at 15:59

## 2024-09-12 RX ADMIN — MORPHINE SULFATE 2 MG: 2 INJECTION, SOLUTION INTRAMUSCULAR; INTRAVENOUS at 04:48

## 2024-09-12 RX ADMIN — MORPHINE SULFATE 2 MG: 2 INJECTION, SOLUTION INTRAMUSCULAR; INTRAVENOUS at 17:43

## 2024-09-12 RX ADMIN — DIPHENHYDRAMINE HYDROCHLORIDE 50 MG: 50 INJECTION INTRAMUSCULAR; INTRAVENOUS at 14:48

## 2024-09-12 RX ADMIN — SODIUM CHLORIDE, POTASSIUM CHLORIDE, SODIUM LACTATE AND CALCIUM CHLORIDE: 600; 310; 30; 20 INJECTION, SOLUTION INTRAVENOUS at 00:16

## 2024-09-12 RX ADMIN — PROPOFOL 50 MG: 10 INJECTION, EMULSION INTRAVENOUS at 16:23

## 2024-09-12 RX ADMIN — PROPOFOL 50 MG: 10 INJECTION, EMULSION INTRAVENOUS at 16:06

## 2024-09-12 RX ADMIN — MORPHINE SULFATE 2 MG: 2 INJECTION, SOLUTION INTRAMUSCULAR; INTRAVENOUS at 07:48

## 2024-09-12 RX ADMIN — MORPHINE SULFATE 2 MG: 2 INJECTION, SOLUTION INTRAMUSCULAR; INTRAVENOUS at 10:46

## 2024-09-12 RX ADMIN — BUPIVACAINE HYDROCHLORIDE 150 MG: 5 INJECTION, SOLUTION EPIDURAL; INTRACAUDAL; PERINEURAL at 16:15

## 2024-09-12 RX ADMIN — CEFTRIAXONE SODIUM 1000 MG: 1 INJECTION, POWDER, FOR SOLUTION INTRAMUSCULAR; INTRAVENOUS at 02:47

## 2024-09-12 RX ADMIN — SODIUM CHLORIDE, POTASSIUM CHLORIDE, SODIUM LACTATE AND CALCIUM CHLORIDE: 600; 310; 30; 20 INJECTION, SOLUTION INTRAVENOUS at 17:54

## 2024-09-12 RX ADMIN — PROPOFOL 50 MG: 10 INJECTION, EMULSION INTRAVENOUS at 16:03

## 2024-09-12 RX ADMIN — CIPROFLOXACIN 200 MG: 2 INJECTION, SOLUTION INTRAVENOUS at 15:15

## 2024-09-12 RX ADMIN — PANTOPRAZOLE SODIUM 8 MG/HR: 40 INJECTION, POWDER, FOR SOLUTION INTRAVENOUS at 20:53

## 2024-09-12 RX ADMIN — DIPHENHYDRAMINE HYDROCHLORIDE 50 MG: 50 INJECTION INTRAMUSCULAR; INTRAVENOUS at 20:53

## 2024-09-12 RX ADMIN — ONDANSETRON 4 MG: 2 INJECTION INTRAMUSCULAR; INTRAVENOUS at 22:35

## 2024-09-12 RX ADMIN — PROPOFOL 50 MG: 10 INJECTION, EMULSION INTRAVENOUS at 16:32

## 2024-09-12 ASSESSMENT — PAIN SCALES - GENERAL
PAINLEVEL_OUTOF10: 5
PAINLEVEL_OUTOF10: 7
PAINLEVEL_OUTOF10: 7
PAINLEVEL_OUTOF10: 8
PAINLEVEL_OUTOF10: 8
PAINLEVEL_OUTOF10: 6
PAINLEVEL_OUTOF10: 7
PAINLEVEL_OUTOF10: 6
PAINLEVEL_OUTOF10: 9
PAINLEVEL_OUTOF10: 6
PAINLEVEL_OUTOF10: 6
PAINLEVEL_OUTOF10: 5
PAINLEVEL_OUTOF10: 8
PAINLEVEL_OUTOF10: 5
PAINLEVEL_OUTOF10: 8

## 2024-09-12 ASSESSMENT — PAIN DESCRIPTION - LOCATION
LOCATION: ABDOMEN;NECK
LOCATION: ABDOMEN
LOCATION: NECK;ABDOMEN
LOCATION: ABDOMEN

## 2024-09-12 ASSESSMENT — PAIN - FUNCTIONAL ASSESSMENT
PAIN_FUNCTIONAL_ASSESSMENT: ACTIVITIES ARE NOT PREVENTED
PAIN_FUNCTIONAL_ASSESSMENT: 0-10
PAIN_FUNCTIONAL_ASSESSMENT: ACTIVITIES ARE NOT PREVENTED
PAIN_FUNCTIONAL_ASSESSMENT: 0-10
PAIN_FUNCTIONAL_ASSESSMENT: ACTIVITIES ARE NOT PREVENTED
PAIN_FUNCTIONAL_ASSESSMENT: 0-10
PAIN_FUNCTIONAL_ASSESSMENT: 0-10
PAIN_FUNCTIONAL_ASSESSMENT: ACTIVITIES ARE NOT PREVENTED

## 2024-09-12 ASSESSMENT — PAIN DESCRIPTION - DESCRIPTORS
DESCRIPTORS: SHARP
DESCRIPTORS: SHARP;TIGHTNESS
DESCRIPTORS: SORE;TENDER
DESCRIPTORS: ACHING;SHARP
DESCRIPTORS: ACHING;SHARP;SHOOTING
DESCRIPTORS: SORE;TENDER
DESCRIPTORS: SHARP

## 2024-09-12 ASSESSMENT — PAIN DESCRIPTION - ORIENTATION
ORIENTATION: UPPER;MID
ORIENTATION: MID;UPPER
ORIENTATION: RIGHT
ORIENTATION: LEFT
ORIENTATION: MID;UPPER
ORIENTATION: LEFT
ORIENTATION: LOWER;UPPER

## 2024-09-12 ASSESSMENT — PAIN DESCRIPTION - PAIN TYPE
TYPE: ACUTE PAIN

## 2024-09-12 ASSESSMENT — PAIN DESCRIPTION - FREQUENCY
FREQUENCY: INTERMITTENT
FREQUENCY: INTERMITTENT

## 2024-09-12 ASSESSMENT — PAIN DESCRIPTION - ONSET
ONSET: ON-GOING
ONSET: ON-GOING

## 2024-09-13 VITALS
TEMPERATURE: 98.2 F | HEART RATE: 74 BPM | DIASTOLIC BLOOD PRESSURE: 68 MMHG | OXYGEN SATURATION: 100 % | SYSTOLIC BLOOD PRESSURE: 107 MMHG | RESPIRATION RATE: 16 BRPM | WEIGHT: 140 LBS | HEIGHT: 67 IN | BODY MASS INDEX: 21.97 KG/M2

## 2024-09-13 LAB
ALBUMIN SERPL BCG-MCNC: 3.4 G/DL (ref 3.5–5.1)
ALP SERPL-CCNC: 89 U/L (ref 38–126)
ALT SERPL W/O P-5'-P-CCNC: 25 U/L (ref 11–66)
ANION GAP SERPL CALC-SCNC: 10 MEQ/L (ref 8–16)
AST SERPL-CCNC: 26 U/L (ref 5–40)
BASOPHILS ABSOLUTE: 0 THOU/MM3 (ref 0–0.1)
BASOPHILS NFR BLD AUTO: 0.3 %
BILIRUB SERPL-MCNC: 0.4 MG/DL (ref 0.3–1.2)
BUN SERPL-MCNC: < 2 MG/DL (ref 7–22)
CALCIUM SERPL-MCNC: 8.8 MG/DL (ref 8.5–10.5)
CHLORIDE SERPL-SCNC: 104 MEQ/L (ref 98–111)
CO2 SERPL-SCNC: 28 MEQ/L (ref 23–33)
CREAT SERPL-MCNC: 0.7 MG/DL (ref 0.4–1.2)
DEPRECATED RDW RBC AUTO: 43.5 FL (ref 35–45)
EOSINOPHIL NFR BLD AUTO: 2 %
EOSINOPHILS ABSOLUTE: 0.1 THOU/MM3 (ref 0–0.4)
ERYTHROCYTE [DISTWIDTH] IN BLOOD BY AUTOMATED COUNT: 14.1 % (ref 11.5–14.5)
FERRITIN SERPL IA-MCNC: 9 NG/ML (ref 10–291)
FOLATE SERPL-MCNC: 11.9 NG/ML (ref 4.8–24.2)
GFR SERPL CREATININE-BSD FRML MDRD: > 90 ML/MIN/1.73M2
GLUCOSE SERPL-MCNC: 79 MG/DL (ref 70–108)
H PYLORI AG STL QL IA: NORMAL
HCT VFR BLD AUTO: 32.7 % (ref 37–47)
HGB BLD-MCNC: 10.4 GM/DL (ref 12–16)
HIV 1+2 AB+HIV1 P24 AG SERPL QL IA: NORMAL
IMM GRANULOCYTES # BLD AUTO: 0.01 THOU/MM3 (ref 0–0.07)
IMM GRANULOCYTES NFR BLD AUTO: 0.3 %
IRON SATN MFR SERPL: 11 % (ref 20–50)
IRON SERPL-MCNC: 33 UG/DL (ref 50–170)
LYMPHOCYTES ABSOLUTE: 1 THOU/MM3 (ref 1–4.8)
LYMPHOCYTES NFR BLD AUTO: 27.8 %
MAGNESIUM SERPL-MCNC: 1.4 MG/DL (ref 1.6–2.4)
MCH RBC QN AUTO: 27.2 PG (ref 26–33)
MCHC RBC AUTO-ENTMCNC: 31.8 GM/DL (ref 32.2–35.5)
MCV RBC AUTO: 85.4 FL (ref 81–99)
MONOCYTES ABSOLUTE: 0.4 THOU/MM3 (ref 0.4–1.3)
MONOCYTES NFR BLD AUTO: 10.5 %
MRSA DNA SPEC QL NAA+PROBE: NEGATIVE
NEUTROPHILS ABSOLUTE: 2.1 THOU/MM3 (ref 1.8–7.7)
NEUTROPHILS NFR BLD AUTO: 59.1 %
NRBC BLD AUTO-RTO: 0 /100 WBC
PLATELET # BLD AUTO: 142 THOU/MM3 (ref 130–400)
PMV BLD AUTO: 12 FL (ref 9.4–12.4)
POTASSIUM SERPL-SCNC: 3.4 MEQ/L (ref 3.5–5.2)
PROT SERPL-MCNC: 5.7 G/DL (ref 6.1–8)
RBC # BLD AUTO: 3.83 MILL/MM3 (ref 4.2–5.4)
SODIUM SERPL-SCNC: 142 MEQ/L (ref 135–145)
TIBC SERPL-MCNC: 303 UG/DL (ref 171–450)
VIT B12 SERPL-MCNC: 788 PG/ML (ref 211–911)
WBC # BLD AUTO: 3.5 THOU/MM3 (ref 4.8–10.8)

## 2024-09-13 PROCEDURE — 80053 COMPREHEN METABOLIC PANEL: CPT

## 2024-09-13 PROCEDURE — 83550 IRON BINDING TEST: CPT

## 2024-09-13 PROCEDURE — 83735 ASSAY OF MAGNESIUM: CPT

## 2024-09-13 PROCEDURE — 2580000003 HC RX 258

## 2024-09-13 PROCEDURE — 82746 ASSAY OF FOLIC ACID SERUM: CPT

## 2024-09-13 PROCEDURE — 87389 HIV-1 AG W/HIV-1&-2 AB AG IA: CPT

## 2024-09-13 PROCEDURE — 2500000003 HC RX 250 WO HCPCS

## 2024-09-13 PROCEDURE — 36415 COLL VENOUS BLD VENIPUNCTURE: CPT

## 2024-09-13 PROCEDURE — G0378 HOSPITAL OBSERVATION PER HR: HCPCS

## 2024-09-13 PROCEDURE — 6360000002 HC RX W HCPCS

## 2024-09-13 PROCEDURE — 6360000002 HC RX W HCPCS: Performed by: PHYSICIAN ASSISTANT

## 2024-09-13 PROCEDURE — 2580000003 HC RX 258: Performed by: NURSE PRACTITIONER

## 2024-09-13 PROCEDURE — 85025 COMPLETE CBC W/AUTO DIFF WBC: CPT

## 2024-09-13 PROCEDURE — 96376 TX/PRO/DX INJ SAME DRUG ADON: CPT

## 2024-09-13 PROCEDURE — 96368 THER/DIAG CONCURRENT INF: CPT

## 2024-09-13 PROCEDURE — 6360000002 HC RX W HCPCS: Performed by: NURSE PRACTITIONER

## 2024-09-13 PROCEDURE — 82787 IGG 1 2 3 OR 4 EACH: CPT

## 2024-09-13 PROCEDURE — 82728 ASSAY OF FERRITIN: CPT

## 2024-09-13 PROCEDURE — 82607 VITAMIN B-12: CPT

## 2024-09-13 PROCEDURE — 99239 HOSP IP/OBS DSCHRG MGMT >30: CPT | Performed by: INTERNAL MEDICINE

## 2024-09-13 PROCEDURE — 6370000000 HC RX 637 (ALT 250 FOR IP)

## 2024-09-13 PROCEDURE — 87641 MR-STAPH DNA AMP PROBE: CPT

## 2024-09-13 PROCEDURE — 96366 THER/PROPH/DIAG IV INF ADDON: CPT

## 2024-09-13 PROCEDURE — 83540 ASSAY OF IRON: CPT

## 2024-09-13 RX ORDER — ONDANSETRON 4 MG/1
4 TABLET, ORALLY DISINTEGRATING ORAL EVERY 8 HOURS PRN
Qty: 15 TABLET | Refills: 0 | Status: ON HOLD | OUTPATIENT
Start: 2024-09-13

## 2024-09-13 RX ORDER — METRONIDAZOLE 500 MG/1
500 TABLET ORAL 2 TIMES DAILY
Qty: 10 TABLET | Refills: 0 | Status: ON HOLD | OUTPATIENT
Start: 2024-09-13 | End: 2024-09-18

## 2024-09-13 RX ORDER — HYDROMORPHONE HYDROCHLORIDE 2 MG/1
2 TABLET ORAL EVERY 12 HOURS PRN
Qty: 10 TABLET | Refills: 0 | Status: ON HOLD | OUTPATIENT
Start: 2024-09-13 | End: 2024-09-18

## 2024-09-13 RX ADMIN — MORPHINE SULFATE 2 MG: 2 INJECTION, SOLUTION INTRAMUSCULAR; INTRAVENOUS at 08:21

## 2024-09-13 RX ADMIN — MORPHINE SULFATE 2 MG: 2 INJECTION, SOLUTION INTRAMUSCULAR; INTRAVENOUS at 01:01

## 2024-09-13 RX ADMIN — MORPHINE SULFATE 2 MG: 2 INJECTION, SOLUTION INTRAMUSCULAR; INTRAVENOUS at 04:12

## 2024-09-13 RX ADMIN — DIPHENHYDRAMINE HYDROCHLORIDE 50 MG: 50 INJECTION INTRAMUSCULAR; INTRAVENOUS at 09:31

## 2024-09-13 RX ADMIN — POTASSIUM CHLORIDE 40 MEQ: 1500 TABLET, EXTENDED RELEASE ORAL at 05:38

## 2024-09-13 RX ADMIN — DIPHENHYDRAMINE HYDROCHLORIDE 50 MG: 50 INJECTION INTRAMUSCULAR; INTRAVENOUS at 15:31

## 2024-09-13 RX ADMIN — PANTOPRAZOLE SODIUM 8 MG/HR: 40 INJECTION, POWDER, FOR SOLUTION INTRAVENOUS at 09:02

## 2024-09-13 RX ADMIN — CEFTRIAXONE SODIUM 1000 MG: 1 INJECTION, POWDER, FOR SOLUTION INTRAMUSCULAR; INTRAVENOUS at 03:05

## 2024-09-13 RX ADMIN — DIPHENHYDRAMINE HYDROCHLORIDE 50 MG: 50 INJECTION INTRAMUSCULAR; INTRAVENOUS at 02:58

## 2024-09-13 RX ADMIN — SODIUM CHLORIDE, POTASSIUM CHLORIDE, SODIUM LACTATE AND CALCIUM CHLORIDE: 600; 310; 30; 20 INJECTION, SOLUTION INTRAVENOUS at 00:58

## 2024-09-13 RX ADMIN — MORPHINE SULFATE 2 MG: 2 INJECTION, SOLUTION INTRAMUSCULAR; INTRAVENOUS at 11:37

## 2024-09-13 RX ADMIN — SODIUM CHLORIDE, PRESERVATIVE FREE 20 MG: 5 INJECTION INTRAVENOUS at 08:22

## 2024-09-13 ASSESSMENT — PAIN DESCRIPTION - ORIENTATION
ORIENTATION: LEFT
ORIENTATION: UPPER

## 2024-09-13 ASSESSMENT — PAIN SCALES - GENERAL
PAINLEVEL_OUTOF10: 7
PAINLEVEL_OUTOF10: 8
PAINLEVEL_OUTOF10: 6
PAINLEVEL_OUTOF10: 8
PAINLEVEL_OUTOF10: 6
PAINLEVEL_OUTOF10: 9
PAINLEVEL_OUTOF10: 9

## 2024-09-13 ASSESSMENT — PAIN DESCRIPTION - LOCATION
LOCATION: ABDOMEN;NECK

## 2024-09-13 ASSESSMENT — PAIN - FUNCTIONAL ASSESSMENT
PAIN_FUNCTIONAL_ASSESSMENT: ACTIVITIES ARE NOT PREVENTED

## 2024-09-13 ASSESSMENT — PAIN DESCRIPTION - DESCRIPTORS
DESCRIPTORS: SHARP
DESCRIPTORS: SHARP;PRESSURE
DESCRIPTORS: ACHING;SHARP
DESCRIPTORS: SHARP;PRESSURE

## 2024-09-13 ASSESSMENT — PAIN DESCRIPTION - PAIN TYPE: TYPE: ACUTE PAIN

## 2024-09-13 ASSESSMENT — PAIN DESCRIPTION - FREQUENCY: FREQUENCY: INTERMITTENT

## 2024-09-13 ASSESSMENT — PAIN DESCRIPTION - ONSET: ONSET: ON-GOING

## 2024-09-14 LAB — CALPROTECTIN STL-MCNT: 18 UG/G

## 2024-09-15 LAB — IGG SUBCLASSES: NORMAL

## 2024-09-18 ENCOUNTER — HOSPITAL ENCOUNTER (INPATIENT)
Age: 34
LOS: 4 days | Discharge: HOME | DRG: 378 | End: 2024-09-27
Attending: EMERGENCY MEDICINE | Admitting: INTERNAL MEDICINE
Payer: COMMERCIAL

## 2024-09-18 DIAGNOSIS — K29.70 HELICOBACTER PYLORI GASTRITIS: ICD-10-CM

## 2024-09-18 DIAGNOSIS — D35.2 PITUITARY BENIGN NEOPLASM (HCC): ICD-10-CM

## 2024-09-18 DIAGNOSIS — B96.81 HELICOBACTER PYLORI GASTRITIS: ICD-10-CM

## 2024-09-18 DIAGNOSIS — R11.2 NAUSEA AND VOMITING, UNSPECIFIED VOMITING TYPE: ICD-10-CM

## 2024-09-18 DIAGNOSIS — M06.9 MYOSITIS OF SHOULDER, UNSPECIFIED LATERALITY, UNSPECIFIED MYOSITIS TYPE (HCC): ICD-10-CM

## 2024-09-18 DIAGNOSIS — M60.819 MYOSITIS OF SHOULDER, UNSPECIFIED LATERALITY, UNSPECIFIED MYOSITIS TYPE (HCC): ICD-10-CM

## 2024-09-18 DIAGNOSIS — K92.0 HEMATEMESIS WITH NAUSEA: Primary | ICD-10-CM

## 2024-09-18 LAB
ALBUMIN SERPL BCG-MCNC: 4.2 G/DL (ref 3.5–5.1)
ALP SERPL-CCNC: 99 U/L (ref 38–126)
ALT SERPL W/O P-5'-P-CCNC: 23 U/L (ref 11–66)
ANION GAP SERPL CALC-SCNC: 11 MEQ/L (ref 8–16)
AST SERPL-CCNC: 26 U/L (ref 5–40)
B-HCG SERPL QL: NEGATIVE
BASOPHILS ABSOLUTE: 0 THOU/MM3 (ref 0–0.1)
BASOPHILS NFR BLD AUTO: 0.5 %
BILIRUB SERPL-MCNC: 0.6 MG/DL (ref 0.3–1.2)
BILIRUB UR QL STRIP.AUTO: NEGATIVE
BUN SERPL-MCNC: 7 MG/DL (ref 7–22)
CALCIUM SERPL-MCNC: 9.1 MG/DL (ref 8.5–10.5)
CHARACTER UR: CLEAR
CHLORIDE SERPL-SCNC: 107 MEQ/L (ref 98–111)
CO2 SERPL-SCNC: 21 MEQ/L (ref 23–33)
COLOR, UA: YELLOW
CREAT SERPL-MCNC: 0.6 MG/DL (ref 0.4–1.2)
DEPRECATED RDW RBC AUTO: 44.6 FL (ref 35–45)
EKG ATRIAL RATE: 77 BPM
EKG P AXIS: 35 DEGREES
EKG P-R INTERVAL: 132 MS
EKG Q-T INTERVAL: 370 MS
EKG QRS DURATION: 86 MS
EKG QTC CALCULATION (BAZETT): 418 MS
EKG R AXIS: 84 DEGREES
EKG T AXIS: 71 DEGREES
EKG VENTRICULAR RATE: 77 BPM
EOSINOPHIL NFR BLD AUTO: 0.7 %
EOSINOPHILS ABSOLUTE: 0 THOU/MM3 (ref 0–0.4)
ERYTHROCYTE [DISTWIDTH] IN BLOOD BY AUTOMATED COUNT: 14.6 % (ref 11.5–14.5)
GFR SERPL CREATININE-BSD FRML MDRD: > 90 ML/MIN/1.73M2
GLUCOSE SERPL-MCNC: 80 MG/DL (ref 70–108)
GLUCOSE UR QL STRIP.AUTO: NEGATIVE MG/DL
HCT VFR BLD AUTO: 38.3 % (ref 37–47)
HGB BLD-MCNC: 12.3 GM/DL (ref 12–16)
HGB UR QL STRIP.AUTO: NEGATIVE
IMM GRANULOCYTES # BLD AUTO: 0.01 THOU/MM3 (ref 0–0.07)
IMM GRANULOCYTES NFR BLD AUTO: 0.2 %
KETONES UR QL STRIP.AUTO: NEGATIVE
LIPASE SERPL-CCNC: 26.9 U/L (ref 5.6–51.3)
LYMPHOCYTES ABSOLUTE: 1.6 THOU/MM3 (ref 1–4.8)
LYMPHOCYTES NFR BLD AUTO: 38.7 %
MCH RBC QN AUTO: 27.3 PG (ref 26–33)
MCHC RBC AUTO-ENTMCNC: 32.1 GM/DL (ref 32.2–35.5)
MCV RBC AUTO: 84.9 FL (ref 81–99)
MONOCYTES ABSOLUTE: 0.3 THOU/MM3 (ref 0.4–1.3)
MONOCYTES NFR BLD AUTO: 7.1 %
NEUTROPHILS ABSOLUTE: 2.2 THOU/MM3 (ref 1.8–7.7)
NEUTROPHILS NFR BLD AUTO: 52.8 %
NITRITE UR QL STRIP: NEGATIVE
NRBC BLD AUTO-RTO: 0 /100 WBC
OSMOLALITY SERPL CALC.SUM OF ELEC: 274.5 MOSMOL/KG (ref 275–300)
PH UR STRIP.AUTO: 6 [PH] (ref 5–9)
PLATELET # BLD AUTO: 191 THOU/MM3 (ref 130–400)
PMV BLD AUTO: 12.6 FL (ref 9.4–12.4)
POTASSIUM SERPL-SCNC: 4.4 MEQ/L (ref 3.5–5.2)
PROT SERPL-MCNC: 7.2 G/DL (ref 6.1–8)
PROT UR STRIP.AUTO-MCNC: NEGATIVE MG/DL
RBC # BLD AUTO: 4.51 MILL/MM3 (ref 4.2–5.4)
SODIUM SERPL-SCNC: 139 MEQ/L (ref 135–145)
SP GR UR REFRACT.AUTO: 1.01 (ref 1–1.03)
UROBILINOGEN, URINE: 0.2 EU/DL (ref 0–1)
WBC # BLD AUTO: 4.1 THOU/MM3 (ref 4.8–10.8)
WBC #/AREA URNS HPF: NEGATIVE /[HPF]

## 2024-09-18 PROCEDURE — 93010 ELECTROCARDIOGRAM REPORT: CPT | Performed by: INTERNAL MEDICINE

## 2024-09-18 PROCEDURE — 83540 ASSAY OF IRON: CPT

## 2024-09-18 PROCEDURE — 96375 TX/PRO/DX INJ NEW DRUG ADDON: CPT

## 2024-09-18 PROCEDURE — 96374 THER/PROPH/DIAG INJ IV PUSH: CPT

## 2024-09-18 PROCEDURE — 6360000002 HC RX W HCPCS: Performed by: EMERGENCY MEDICINE

## 2024-09-18 PROCEDURE — 80053 COMPREHEN METABOLIC PANEL: CPT

## 2024-09-18 PROCEDURE — 96376 TX/PRO/DX INJ SAME DRUG ADON: CPT

## 2024-09-18 PROCEDURE — 6360000002 HC RX W HCPCS

## 2024-09-18 PROCEDURE — 99285 EMERGENCY DEPT VISIT HI MDM: CPT

## 2024-09-18 PROCEDURE — G0378 HOSPITAL OBSERVATION PER HR: HCPCS

## 2024-09-18 PROCEDURE — 83550 IRON BINDING TEST: CPT

## 2024-09-18 PROCEDURE — 96361 HYDRATE IV INFUSION ADD-ON: CPT

## 2024-09-18 PROCEDURE — 2580000003 HC RX 258: Performed by: EMERGENCY MEDICINE

## 2024-09-18 PROCEDURE — 36415 COLL VENOUS BLD VENIPUNCTURE: CPT

## 2024-09-18 PROCEDURE — 93005 ELECTROCARDIOGRAM TRACING: CPT | Performed by: EMERGENCY MEDICINE

## 2024-09-18 PROCEDURE — 84703 CHORIONIC GONADOTROPIN ASSAY: CPT

## 2024-09-18 PROCEDURE — 81003 URINALYSIS AUTO W/O SCOPE: CPT

## 2024-09-18 PROCEDURE — 82728 ASSAY OF FERRITIN: CPT

## 2024-09-18 PROCEDURE — 85025 COMPLETE CBC W/AUTO DIFF WBC: CPT

## 2024-09-18 PROCEDURE — 83690 ASSAY OF LIPASE: CPT

## 2024-09-18 RX ORDER — 0.9 % SODIUM CHLORIDE 0.9 %
500 INTRAVENOUS SOLUTION INTRAVENOUS ONCE
Status: COMPLETED | OUTPATIENT
Start: 2024-09-18 | End: 2024-09-18

## 2024-09-18 RX ORDER — ONDANSETRON 4 MG/1
4 TABLET, ORALLY DISINTEGRATING ORAL EVERY 8 HOURS PRN
Status: DISCONTINUED | OUTPATIENT
Start: 2024-09-18 | End: 2024-09-27 | Stop reason: HOSPADM

## 2024-09-18 RX ORDER — MORPHINE SULFATE 2 MG/ML
2 INJECTION, SOLUTION INTRAMUSCULAR; INTRAVENOUS ONCE
Status: COMPLETED | OUTPATIENT
Start: 2024-09-18 | End: 2024-09-18

## 2024-09-18 RX ORDER — SODIUM CHLORIDE 9 MG/ML
INJECTION, SOLUTION INTRAVENOUS CONTINUOUS
Status: ACTIVE | OUTPATIENT
Start: 2024-09-18 | End: 2024-09-19

## 2024-09-18 RX ORDER — MORPHINE SULFATE 4 MG/ML
4 INJECTION, SOLUTION INTRAMUSCULAR; INTRAVENOUS
Status: DISCONTINUED | OUTPATIENT
Start: 2024-09-18 | End: 2024-09-20

## 2024-09-18 RX ORDER — METRONIDAZOLE 500 MG/1
500 TABLET ORAL EVERY 8 HOURS SCHEDULED
Status: DISCONTINUED | OUTPATIENT
Start: 2024-09-18 | End: 2024-09-27 | Stop reason: HOSPADM

## 2024-09-18 RX ORDER — MORPHINE SULFATE 2 MG/ML
2 INJECTION, SOLUTION INTRAMUSCULAR; INTRAVENOUS
Status: DISCONTINUED | OUTPATIENT
Start: 2024-09-18 | End: 2024-09-20

## 2024-09-18 RX ORDER — ONDANSETRON 2 MG/ML
4 INJECTION INTRAMUSCULAR; INTRAVENOUS EVERY 6 HOURS PRN
Status: DISCONTINUED | OUTPATIENT
Start: 2024-09-18 | End: 2024-09-27 | Stop reason: HOSPADM

## 2024-09-18 RX ORDER — POTASSIUM CHLORIDE 1500 MG/1
40 TABLET, EXTENDED RELEASE ORAL PRN
Status: DISCONTINUED | OUTPATIENT
Start: 2024-09-18 | End: 2024-09-27 | Stop reason: HOSPADM

## 2024-09-18 RX ORDER — SODIUM CHLORIDE 0.9 % (FLUSH) 0.9 %
5-40 SYRINGE (ML) INJECTION EVERY 12 HOURS SCHEDULED
Status: DISCONTINUED | OUTPATIENT
Start: 2024-09-18 | End: 2024-09-27 | Stop reason: HOSPADM

## 2024-09-18 RX ORDER — DIPHENHYDRAMINE HYDROCHLORIDE 50 MG/ML
50 INJECTION INTRAMUSCULAR; INTRAVENOUS EVERY 6 HOURS PRN
Status: DISCONTINUED | OUTPATIENT
Start: 2024-09-19 | End: 2024-09-18

## 2024-09-18 RX ORDER — MAGNESIUM SULFATE IN WATER 40 MG/ML
2000 INJECTION, SOLUTION INTRAVENOUS PRN
Status: DISCONTINUED | OUTPATIENT
Start: 2024-09-18 | End: 2024-09-27 | Stop reason: HOSPADM

## 2024-09-18 RX ORDER — MORPHINE SULFATE 4 MG/ML
4 INJECTION, SOLUTION INTRAMUSCULAR; INTRAVENOUS ONCE
Status: COMPLETED | OUTPATIENT
Start: 2024-09-18 | End: 2024-09-18

## 2024-09-18 RX ORDER — POLYETHYLENE GLYCOL 3350 17 G/17G
17 POWDER, FOR SOLUTION ORAL DAILY PRN
Status: DISCONTINUED | OUTPATIENT
Start: 2024-09-18 | End: 2024-09-27 | Stop reason: HOSPADM

## 2024-09-18 RX ORDER — ONDANSETRON 2 MG/ML
4 INJECTION INTRAMUSCULAR; INTRAVENOUS ONCE
Status: COMPLETED | OUTPATIENT
Start: 2024-09-18 | End: 2024-09-18

## 2024-09-18 RX ORDER — ENOXAPARIN SODIUM 100 MG/ML
40 INJECTION SUBCUTANEOUS DAILY
Status: DISCONTINUED | OUTPATIENT
Start: 2024-09-19 | End: 2024-09-27 | Stop reason: HOSPADM

## 2024-09-18 RX ORDER — DIPHENHYDRAMINE HYDROCHLORIDE 50 MG/ML
50 INJECTION INTRAMUSCULAR; INTRAVENOUS ONCE
Status: COMPLETED | OUTPATIENT
Start: 2024-09-18 | End: 2024-09-18

## 2024-09-18 RX ORDER — CLARITHROMYCIN 500 MG
500 TABLET ORAL EVERY 12 HOURS SCHEDULED
Status: DISPENSED | OUTPATIENT
Start: 2024-09-18 | End: 2024-09-26

## 2024-09-18 RX ORDER — POTASSIUM CHLORIDE 7.45 MG/ML
10 INJECTION INTRAVENOUS PRN
Status: DISCONTINUED | OUTPATIENT
Start: 2024-09-18 | End: 2024-09-27 | Stop reason: HOSPADM

## 2024-09-18 RX ORDER — DIPHENHYDRAMINE HYDROCHLORIDE 50 MG/ML
50 INJECTION INTRAMUSCULAR; INTRAVENOUS EVERY 6 HOURS PRN
Status: DISCONTINUED | OUTPATIENT
Start: 2024-09-18 | End: 2024-09-22

## 2024-09-18 RX ORDER — PANTOPRAZOLE SODIUM 40 MG/10ML
40 INJECTION, POWDER, LYOPHILIZED, FOR SOLUTION INTRAVENOUS ONCE
Status: COMPLETED | OUTPATIENT
Start: 2024-09-18 | End: 2024-09-18

## 2024-09-18 RX ORDER — SODIUM CHLORIDE 9 MG/ML
INJECTION, SOLUTION INTRAVENOUS CONTINUOUS
Status: DISCONTINUED | OUTPATIENT
Start: 2024-09-18 | End: 2024-09-18

## 2024-09-18 RX ORDER — DOXYCYCLINE HYCLATE 100 MG
100 TABLET ORAL EVERY 12 HOURS SCHEDULED
Status: DISCONTINUED | OUTPATIENT
Start: 2024-09-18 | End: 2024-09-18

## 2024-09-18 RX ORDER — PANTOPRAZOLE SODIUM 40 MG/10ML
40 INJECTION, POWDER, LYOPHILIZED, FOR SOLUTION INTRAVENOUS 2 TIMES DAILY
Status: DISCONTINUED | OUTPATIENT
Start: 2024-09-19 | End: 2024-09-24 | Stop reason: SDUPTHER

## 2024-09-18 RX ORDER — METRONIDAZOLE 500 MG/1
500 TABLET ORAL EVERY 8 HOURS SCHEDULED
Status: DISCONTINUED | OUTPATIENT
Start: 2024-09-18 | End: 2024-09-18

## 2024-09-18 RX ORDER — SODIUM CHLORIDE 0.9 % (FLUSH) 0.9 %
5-40 SYRINGE (ML) INJECTION PRN
Status: DISCONTINUED | OUTPATIENT
Start: 2024-09-18 | End: 2024-09-27 | Stop reason: HOSPADM

## 2024-09-18 RX ORDER — SODIUM CHLORIDE 9 MG/ML
INJECTION, SOLUTION INTRAVENOUS PRN
Status: DISCONTINUED | OUTPATIENT
Start: 2024-09-18 | End: 2024-09-27 | Stop reason: HOSPADM

## 2024-09-18 RX ADMIN — MORPHINE SULFATE 2 MG: 2 INJECTION, SOLUTION INTRAMUSCULAR; INTRAVENOUS at 20:07

## 2024-09-18 RX ADMIN — DIPHENHYDRAMINE HYDROCHLORIDE 50 MG: 50 INJECTION INTRAMUSCULAR; INTRAVENOUS at 22:18

## 2024-09-18 RX ADMIN — DIPHENHYDRAMINE HYDROCHLORIDE 50 MG: 50 INJECTION INTRAMUSCULAR; INTRAVENOUS at 18:12

## 2024-09-18 RX ADMIN — MORPHINE SULFATE 4 MG: 4 INJECTION, SOLUTION INTRAMUSCULAR; INTRAVENOUS at 18:12

## 2024-09-18 RX ADMIN — SODIUM CHLORIDE: 9 INJECTION, SOLUTION INTRAVENOUS at 20:13

## 2024-09-18 RX ADMIN — ONDANSETRON 4 MG: 2 INJECTION INTRAMUSCULAR; INTRAVENOUS at 20:07

## 2024-09-18 RX ADMIN — DIPHENHYDRAMINE HYDROCHLORIDE 50 MG: 50 INJECTION INTRAMUSCULAR; INTRAVENOUS at 20:32

## 2024-09-18 RX ADMIN — PANTOPRAZOLE SODIUM 40 MG: 40 INJECTION, POWDER, FOR SOLUTION INTRAVENOUS at 18:12

## 2024-09-18 RX ADMIN — SODIUM CHLORIDE: 9 INJECTION, SOLUTION INTRAVENOUS at 18:22

## 2024-09-18 RX ADMIN — SODIUM CHLORIDE 500 ML: 9 INJECTION, SOLUTION INTRAVENOUS at 18:11

## 2024-09-18 ASSESSMENT — PAIN DESCRIPTION - LOCATION
LOCATION: ABDOMEN

## 2024-09-18 ASSESSMENT — PAIN DESCRIPTION - PAIN TYPE: TYPE: ACUTE PAIN

## 2024-09-18 ASSESSMENT — PAIN SCALES - GENERAL
PAINLEVEL_OUTOF10: 8
PAINLEVEL_OUTOF10: 9
PAINLEVEL_OUTOF10: 6
PAINLEVEL_OUTOF10: 8
PAINLEVEL_OUTOF10: 8
PAINLEVEL_OUTOF10: 5
PAINLEVEL_OUTOF10: 9
PAINLEVEL_OUTOF10: 6

## 2024-09-18 ASSESSMENT — PAIN - FUNCTIONAL ASSESSMENT
PAIN_FUNCTIONAL_ASSESSMENT: 0-10

## 2024-09-18 ASSESSMENT — PAIN DESCRIPTION - ORIENTATION
ORIENTATION: UPPER;LEFT
ORIENTATION: LEFT;UPPER

## 2024-09-18 ASSESSMENT — PAIN DESCRIPTION - DESCRIPTORS: DESCRIPTORS: ACHING;SHARP

## 2024-09-18 NOTE — ED NOTES
Pt to ED c/o upper abdominal pain. Pt states she was admitted for about 4 days her at [facility]. Pt states she has pancreatitis and they found h pylori in her stomach. Pt states her GI doctor is not sure which is causing the pain and advised her to be seen in the ED. VSS. Pt ambulatory to room 40.

## 2024-09-18 NOTE — ED PROVIDER NOTES
[facility] EMERGENCY DEPT  Emergency Department  Attending Physician Attestation       Patient was seen by  Medical Student Peg Van and I supervised/co-managed the case    I performed a history and physical examination of the patient and discussed management with the medical student. I reviewed the medical student's note and agree with the documented findings and plan of care. Any areas of disagreement are noted on the chart. I was personally present for the key portions of any procedures. I have documented in the chart those procedures where I was not present during the key portions. I have reviewed the emergency nurses triage note. I agree with the chief complaint, past medical history, past surgical history, allergies, medications, social and family history as documented unless otherwise noted below. For Physician Assistant/ Nurse Practitioner cases I have personally evaluated this patient and have completed at least one if not all key elements of the E/M (history, physical exam, and MDM). Additional findings are as noted.       Chief Complaint      Dixon Conn is a 33 y.o. female who presented to the emergency room due to nausea vomiting diarrhea and abdominal pain since he was discharged from this hospital.  The patient was just recently admitted 9/9/2024 and was discharged 9/13/2024 due to recurrent acute pancreatitis.  The patient states that she normally lives in [place] however she moved here to [place] where the  and children are staying at present however she wants to come back to this hospital as she gets treated better.  During her hospitalization she underwent EGD by Dr Porter and successful ultrasound-guided biopsy by interventional GI and underwent a celiac block  9/12/2024 by Dr Vergara.  She has seen Kiera Mann CNP of Dr. Porter today who advised her to come here to be evaluated.,  She was told that she is positive for H. Pylori.   patient states that she has been  having vomiting coffee-ground with blood.  Patient when discharge was told that she has to eat clear liquid however he went eating and pizza and having more abdominal pain.  Patient cannot keep even her medication including Creon.       System Problem List     Patient Active Problem List   Diagnosis    Recurrent acute pancreatitis       Pertinent Physical Exam:     weight is 63.5 kg (140 lb). Her oral temperature is 98.1 °F (36.7 °C). Her blood pressure is 119/67 and her pulse is 86. Her respiration is 18 and oxygen saturation is 100%.     Gen:    Non-toxic, well appearing  Head:  Atraumatic, normocephalic              Extraocular muscles intact, pupils equally reactive              Oropharynx Clear, mucous membranes moist  Neck:  Supple, no meningismus; No LAD  Chest: Clear to auscultation bilateral  Heart.  Regular rate and rhythm, no heave  Abd/ Genitalia:    Soft tender on the epigastric area ,normal bowel sound  Back:  Extrem: No edema, neg homans.  Neuro:   Alert, Awake, no lateralizing deficts               CN's grossly intact bilaterally    DIAGNOSTIC RESULTS     RADIOLOGY:   No orders to display     LABS:  Labs Reviewed   CBC WITH AUTO DIFFERENTIAL - Abnormal; Notable for the following components:       Result Value    WBC 4.1 (*)     MCHC 32.1 (*)     RDW-CV 14.6 (*)     MPV 12.6 (*)     Monocytes Absolute 0.3 (*)     All other components within normal limits   HCG, SERUM, QUALITATIVE   URINALYSIS WITH REFLEX TO CULTURE   SPECIMEN REJECTION   SPECIMEN REJECTION   LIPASE   COMPREHENSIVE METABOLIC PANEL       EMERGENCY DEPARTMENT COURSE:     Vitals:    Vitals:    09/18/24 1715 09/18/24 1815 09/18/24 1923 09/18/24 2019   BP: 111/61 (!) 128/93 (!) 124/58 119/67   Pulse: 86 83 86 86   Resp: 16 18 18 18   Temp:       TempSrc:       SpO2: 100% 96% 100% 100%   Weight:           Medications   0.9 % sodium chloride infusion ( IntraVENous New Bag 9/18/24 2013)   diphenhydrAMINE (BENADRYL) injection 50 mg (has no

## 2024-09-18 NOTE — ED PROVIDER NOTES
[facility] EMERGENCY DEPT  EMERGENCY DEPARTMENT ENCOUNTER          Pt Name: Dixon Conn  MRN: [id number]  Birthdate 1990  Date of evaluation: 9/18/2024  Physician: Peg Van  Supervising Attending Physician: Abimael Nergo MD       CHIEF COMPLAINT       Chief Complaint   Patient presents with    Abdominal Pain         HISTORY OF PRESENT ILLNESS    HPI  Dixon Conn is a 33 y.o. female who presents to the emergency department from home for evaluation of epigastric pain. Pt reports that she was instructed by her GI Dr. To come to the ED d/t increasing epigastric pain and not being able to tolerate anything by mouth. Pt reports that she was discharged from the hospital on 9/13 for pancreatitis. Pt reports that her pain began again yesterday along w/ N/V/D. Pt reports that she cannot tolerate anything orally. Reports 10 episodes of emesis today, non-bloody, reports 5 episodes of diarrhea today. Fever of 99F at home, HA. Denies any CP, SOB, cough, dizziness, rhinorrhea, swelling. Pt reports that she cannot keep any of her medications down or water, last oral intake yesterday afternoon.   The patient has no other acute complaints at this time.      PAST MEDICAL AND SURGICAL HISTORY   No past medical history on file.  Past Surgical History:   Procedure Laterality Date    UPPER GASTROINTESTINAL ENDOSCOPY  9/11/2024    ESOPHAGOGASTRODUODENOSCOPY BIOPSY performed by Endy Porter MD at [facility] ENDOSCOPY    UPPER GASTROINTESTINAL ENDOSCOPY N/A 9/12/2024    Celiac Block Injection performed by Marika Vergara MD at [facility] ENDOSCOPY    UPPER GASTROINTESTINAL ENDOSCOPY Left 9/12/2024    ENDOSCOPIC ULTRASOUND performed by Marika Vergara MD at [facility] ENDOSCOPY         MEDICATIONS   No current facility-administered medications for this encounter.    Current Outpatient Medications:     ondansetron (ZOFRAN-ODT) 4 MG disintegrating tablet, Take 1 tablet by mouth every 8 hours as needed for  interpretation if applicable.  (A negative COVID-19 test should be interpreted as COVID no longer suspected unless otherwise noted in this encounter documentation note)  (Any cultures that may have been sent were not resulted at the time of this patient ED visit)      Radiologic studies results available at the moment of this note (None if blank):  No orders to display     See ED course below for my interpretation if applicable.  All radiology images independently reviewed by me in the clinical context of this patient, in addition to interpretation provided by the radiologist.      EKG interpretation (none if blank):  {ekg findings:51358}  All EKG results are individually reviewed and interpreted by me in the clinical context of this patient.  All EKGs are also interpreted by our Cardiology department, final interpretation may not be available as of the writing of this note.      PREVIOUS RECORDS  AND EXTERNAL INFORMATION REVIEWED   History obtained from: chart review.  Pertinent previous and/or external records reviewed: Noncontributory.  Case discussed with specialties other than Emergency Medicine: {DFTServices consulted:45399::\"Not Applicable\"}      MEDICAL DECISION MAKING   Initial plan:  ***    Comorbid conditions pertinent to this ED encounter:  {Blank multiple:10916::\"***\",\"Not applicable\"}      Differential Diagnosis includes but is not limited to:  ***     Decision Rules/Clinical Scores utilized:  {Decision Rules/Clinical Scores utilized:10692::\"Not Applicable\"} ***    Code Status:  {DFTCodeStatus:95372::\"Not addressed during this ED visit\"}    Social determinants of health impacting treatment or disposition:  {SOCDETHEALTH:17958::\"Considered and not applicable \"}    [facility] documentation:  {[facility] notes (Optional):83263::\"N/A\"}    Medical Decision Making    ***  Patient given ***. Results discussed with patient. Symptoms likely 2/2 ***. Patient verbalized understanding and agreement to plan - *** in stable

## 2024-09-18 NOTE — ED NOTES
Patient resting in bed. Respirations easy and unlabored. No distress noted. Call light within reach. Dr. Negro at bedside

## 2024-09-19 LAB
ANION GAP SERPL CALC-SCNC: 11 MEQ/L (ref 8–16)
BASOPHILS ABSOLUTE: 0 THOU/MM3 (ref 0–0.1)
BASOPHILS NFR BLD AUTO: 0.6 %
BUN SERPL-MCNC: 8 MG/DL (ref 7–22)
CA-I BLD ISE-SCNC: 0.89 MMOL/L (ref 1.12–1.32)
CALCIUM SERPL-MCNC: 8.2 MG/DL (ref 8.5–10.5)
CHLORIDE SERPL-SCNC: 106 MEQ/L (ref 98–111)
CO2 SERPL-SCNC: 23 MEQ/L (ref 23–33)
CREAT SERPL-MCNC: 0.7 MG/DL (ref 0.4–1.2)
DEPRECATED RDW RBC AUTO: 45 FL (ref 35–45)
EOSINOPHIL NFR BLD AUTO: 1.5 %
EOSINOPHILS ABSOLUTE: 0.1 THOU/MM3 (ref 0–0.4)
ERYTHROCYTE [DISTWIDTH] IN BLOOD BY AUTOMATED COUNT: 14.6 % (ref 11.5–14.5)
FERRITIN SERPL IA-MCNC: 7 NG/ML (ref 10–291)
GFR SERPL CREATININE-BSD FRML MDRD: > 90 ML/MIN/1.73M2
GLUCOSE SERPL-MCNC: 77 MG/DL (ref 70–108)
HCT VFR BLD AUTO: 32.9 % (ref 37–47)
HGB BLD-MCNC: 10.5 GM/DL (ref 12–16)
IMM GRANULOCYTES # BLD AUTO: 0 THOU/MM3 (ref 0–0.07)
IMM GRANULOCYTES NFR BLD AUTO: 0 %
IRON SATN MFR SERPL: 9 % (ref 20–50)
IRON SERPL-MCNC: 35 UG/DL (ref 50–170)
LYMPHOCYTES ABSOLUTE: 1.6 THOU/MM3 (ref 1–4.8)
LYMPHOCYTES NFR BLD AUTO: 47.1 %
MCH RBC QN AUTO: 27.3 PG (ref 26–33)
MCHC RBC AUTO-ENTMCNC: 31.9 GM/DL (ref 32.2–35.5)
MCV RBC AUTO: 85.5 FL (ref 81–99)
MONOCYTES ABSOLUTE: 0.3 THOU/MM3 (ref 0.4–1.3)
MONOCYTES NFR BLD AUTO: 9.6 %
NEUTROPHILS ABSOLUTE: 1.4 THOU/MM3 (ref 1.8–7.7)
NEUTROPHILS NFR BLD AUTO: 41.2 %
NRBC BLD AUTO-RTO: 0 /100 WBC
OSMOLALITY SERPL CALC.SUM OF ELEC: 276.5 MOSMOL/KG (ref 275–300)
PLATELET # BLD AUTO: 128 THOU/MM3 (ref 130–400)
PMV BLD AUTO: 12.4 FL (ref 9.4–12.4)
POTASSIUM SERPL-SCNC: 3.6 MEQ/L (ref 3.5–5.2)
RBC # BLD AUTO: 3.85 MILL/MM3 (ref 4.2–5.4)
SODIUM SERPL-SCNC: 140 MEQ/L (ref 135–145)
TIBC SERPL-MCNC: 389 UG/DL (ref 171–450)
WBC # BLD AUTO: 3.4 THOU/MM3 (ref 4.8–10.8)

## 2024-09-19 PROCEDURE — 2580000003 HC RX 258

## 2024-09-19 PROCEDURE — 36415 COLL VENOUS BLD VENIPUNCTURE: CPT

## 2024-09-19 PROCEDURE — 96361 HYDRATE IV INFUSION ADD-ON: CPT

## 2024-09-19 PROCEDURE — G0378 HOSPITAL OBSERVATION PER HR: HCPCS

## 2024-09-19 PROCEDURE — 6360000002 HC RX W HCPCS

## 2024-09-19 PROCEDURE — 80048 BASIC METABOLIC PNL TOTAL CA: CPT

## 2024-09-19 PROCEDURE — 96376 TX/PRO/DX INJ SAME DRUG ADON: CPT

## 2024-09-19 PROCEDURE — 85025 COMPLETE CBC W/AUTO DIFF WBC: CPT

## 2024-09-19 PROCEDURE — 6370000000 HC RX 637 (ALT 250 FOR IP)

## 2024-09-19 PROCEDURE — 82330 ASSAY OF CALCIUM: CPT

## 2024-09-19 PROCEDURE — 99223 1ST HOSP IP/OBS HIGH 75: CPT | Performed by: STUDENT IN AN ORGANIZED HEALTH CARE EDUCATION/TRAINING PROGRAM

## 2024-09-19 RX ORDER — SODIUM CHLORIDE 9 MG/ML
INJECTION, SOLUTION INTRAVENOUS CONTINUOUS
Status: ACTIVE | OUTPATIENT
Start: 2024-09-19 | End: 2024-09-20

## 2024-09-19 RX ADMIN — MORPHINE SULFATE 4 MG: 4 INJECTION, SOLUTION INTRAMUSCULAR; INTRAVENOUS at 15:40

## 2024-09-19 RX ADMIN — MORPHINE SULFATE 4 MG: 4 INJECTION, SOLUTION INTRAMUSCULAR; INTRAVENOUS at 09:33

## 2024-09-19 RX ADMIN — METRONIDAZOLE 500 MG: 500 TABLET ORAL at 10:28

## 2024-09-19 RX ADMIN — DIPHENHYDRAMINE HYDROCHLORIDE 50 MG: 50 INJECTION INTRAMUSCULAR; INTRAVENOUS at 04:27

## 2024-09-19 RX ADMIN — DIPHENHYDRAMINE HYDROCHLORIDE 50 MG: 50 INJECTION INTRAMUSCULAR; INTRAVENOUS at 22:20

## 2024-09-19 RX ADMIN — MORPHINE SULFATE 4 MG: 4 INJECTION, SOLUTION INTRAMUSCULAR; INTRAVENOUS at 23:50

## 2024-09-19 RX ADMIN — PANTOPRAZOLE SODIUM 40 MG: 40 INJECTION, POWDER, FOR SOLUTION INTRAVENOUS at 21:49

## 2024-09-19 RX ADMIN — MORPHINE SULFATE 4 MG: 4 INJECTION, SOLUTION INTRAMUSCULAR; INTRAVENOUS at 19:37

## 2024-09-19 RX ADMIN — MORPHINE SULFATE 4 MG: 4 INJECTION, SOLUTION INTRAMUSCULAR; INTRAVENOUS at 21:50

## 2024-09-19 RX ADMIN — METRONIDAZOLE 500 MG: 500 TABLET ORAL at 16:25

## 2024-09-19 RX ADMIN — DIPHENHYDRAMINE HYDROCHLORIDE 50 MG: 50 INJECTION INTRAMUSCULAR; INTRAVENOUS at 16:25

## 2024-09-19 RX ADMIN — SODIUM CHLORIDE, PRESERVATIVE FREE 10 ML: 5 INJECTION INTRAVENOUS at 07:32

## 2024-09-19 RX ADMIN — MORPHINE SULFATE 4 MG: 4 INJECTION, SOLUTION INTRAMUSCULAR; INTRAVENOUS at 00:48

## 2024-09-19 RX ADMIN — MORPHINE SULFATE 4 MG: 4 INJECTION, SOLUTION INTRAMUSCULAR; INTRAVENOUS at 11:34

## 2024-09-19 RX ADMIN — SODIUM CHLORIDE, PRESERVATIVE FREE 10 ML: 5 INJECTION INTRAVENOUS at 19:40

## 2024-09-19 RX ADMIN — SODIUM CHLORIDE: 9 INJECTION, SOLUTION INTRAVENOUS at 00:56

## 2024-09-19 RX ADMIN — CLARITHROMYCIN 500 MG: 500 TABLET ORAL at 13:32

## 2024-09-19 RX ADMIN — METRONIDAZOLE 500 MG: 500 TABLET ORAL at 01:12

## 2024-09-19 RX ADMIN — MORPHINE SULFATE 4 MG: 4 INJECTION, SOLUTION INTRAMUSCULAR; INTRAVENOUS at 02:55

## 2024-09-19 RX ADMIN — MORPHINE SULFATE 2 MG: 2 INJECTION, SOLUTION INTRAMUSCULAR; INTRAVENOUS at 05:33

## 2024-09-19 RX ADMIN — DIPHENHYDRAMINE HYDROCHLORIDE 50 MG: 50 INJECTION INTRAMUSCULAR; INTRAVENOUS at 10:28

## 2024-09-19 RX ADMIN — PANTOPRAZOLE SODIUM 40 MG: 40 INJECTION, POWDER, FOR SOLUTION INTRAVENOUS at 07:32

## 2024-09-19 RX ADMIN — ONDANSETRON 4 MG: 2 INJECTION INTRAMUSCULAR; INTRAVENOUS at 00:51

## 2024-09-19 RX ADMIN — SODIUM CHLORIDE: 9 INJECTION, SOLUTION INTRAVENOUS at 13:35

## 2024-09-19 RX ADMIN — MORPHINE SULFATE 4 MG: 4 INJECTION, SOLUTION INTRAMUSCULAR; INTRAVENOUS at 07:32

## 2024-09-19 RX ADMIN — CLARITHROMYCIN 500 MG: 500 TABLET ORAL at 01:12

## 2024-09-19 RX ADMIN — MORPHINE SULFATE 4 MG: 4 INJECTION, SOLUTION INTRAMUSCULAR; INTRAVENOUS at 17:38

## 2024-09-19 RX ADMIN — MORPHINE SULFATE 4 MG: 4 INJECTION, SOLUTION INTRAMUSCULAR; INTRAVENOUS at 13:32

## 2024-09-19 ASSESSMENT — PAIN SCALES - GENERAL
PAINLEVEL_OUTOF10: 4
PAINLEVEL_OUTOF10: 7
PAINLEVEL_OUTOF10: 5
PAINLEVEL_OUTOF10: 5
PAINLEVEL_OUTOF10: 8
PAINLEVEL_OUTOF10: 4
PAINLEVEL_OUTOF10: 6
PAINLEVEL_OUTOF10: 3
PAINLEVEL_OUTOF10: 8
PAINLEVEL_OUTOF10: 5
PAINLEVEL_OUTOF10: 6
PAINLEVEL_OUTOF10: 3
PAINLEVEL_OUTOF10: 8
PAINLEVEL_OUTOF10: 8
PAINLEVEL_OUTOF10: 7
PAINLEVEL_OUTOF10: 8
PAINLEVEL_OUTOF10: 4
PAINLEVEL_OUTOF10: 9
PAINLEVEL_OUTOF10: 2
PAINLEVEL_OUTOF10: 4
PAINLEVEL_OUTOF10: 7
PAINLEVEL_OUTOF10: 8
PAINLEVEL_OUTOF10: 5

## 2024-09-19 ASSESSMENT — PAIN DESCRIPTION - PAIN TYPE
TYPE: ACUTE PAIN

## 2024-09-19 ASSESSMENT — PAIN DESCRIPTION - LOCATION
LOCATION: ABDOMEN

## 2024-09-19 ASSESSMENT — PAIN DESCRIPTION - DESCRIPTORS
DESCRIPTORS: ACHING;NAGGING
DESCRIPTORS: ACHING
DESCRIPTORS: ACHING;CRAMPING
DESCRIPTORS: ACHING
DESCRIPTORS: SHARP
DESCRIPTORS: ACHING

## 2024-09-19 ASSESSMENT — PAIN SCALES - WONG BAKER
WONGBAKER_NUMERICALRESPONSE: HURTS LITTLE MORE
WONGBAKER_NUMERICALRESPONSE: HURTS A LITTLE BIT

## 2024-09-19 ASSESSMENT — PAIN DESCRIPTION - ONSET: ONSET: ON-GOING

## 2024-09-19 ASSESSMENT — PAIN DESCRIPTION - ORIENTATION: ORIENTATION: MID;LOWER

## 2024-09-19 NOTE — ED NOTES
ED to inpatient nurses report      Chief Complaint:  Chief Complaint   Patient presents with    Abdominal Pain     Present to ED from: Home    MOA:     LOC: alert and orientated to name, place, date  Mobility: Independent  Oxygen Baseline: RA    Current needs required: 0     Code Status:   Prior    What abnormal results were found and what did you give/do to treat them? Hematemesis, H pylori; fluids, labs, medications   Any procedures or intervention occur? NA    Mental Status:  Level of Consciousness: Alert (0)    Psych Assessment:        Vitals:  Patient Vitals for the past 24 hrs:   BP Temp Temp src Pulse Resp SpO2 Weight   09/18/24 2019 119/67 -- -- 86 18 100 % --   09/18/24 1923 (!) 124/58 -- -- 86 18 100 % --   09/18/24 1815 (!) 128/93 -- -- 83 18 96 % --   09/18/24 1715 111/61 -- -- 86 16 100 % --   09/18/24 1629 132/87 98.1 °F (36.7 °C) Oral 91 18 98 % 63.5 kg (140 lb)        LDAs:   Peripheral IV 09/18/24 Right Antecubital (Active)   Site Assessment Clean, dry & intact 09/18/24 1822       Ambulatory Status:  No data recorded    Diagnosis:  DISPOSITION Admitted 09/18/2024 08:39:02 PM   Final diagnoses:   Nausea and vomiting, unspecified vomiting type   Hematemesis with nausea   Helicobacter pylori gastritis        Consults:  None     Pain Score:  Pain Assessment  Pain Assessment: 0-10  Pain Level: 8  Patient's Stated Pain Goal: 4  Pain Location: Abdomen    C-SSRS:        Sepsis Screening:       Miah Fall Risk:       Swallow Screening        Preferred Language:   English      ALLERGIES     Keflex [cephalexin], Adhesive tape, Fentanyl, Haldol [haloperidol], Ketamine, Percocet [oxycodone-acetaminophen], Reglan [metoclopramide], Toradol [ketorolac tromethamine], Tramadol, Vancomycin, and Nsaids    SURGICAL HISTORY       Past Surgical History:   Procedure Laterality Date    UPPER GASTROINTESTINAL ENDOSCOPY  9/11/2024    ESOPHAGOGASTRODUODENOSCOPY BIOPSY performed by Endy Porter MD at [facility] ENDOSCOPY

## 2024-09-19 NOTE — PLAN OF CARE
Problem: Discharge Planning  Goal: Discharge to home or other facility with appropriate resources  Outcome: Progressing  Flowsheets (Taken 9/18/2024 2235 by Tatiana Mendez, RN)  Discharge to home or other facility with appropriate resources: Identify barriers to discharge with patient and caregiver     Problem: Pain  Goal: Verbalizes/displays adequate comfort level or baseline comfort level  Outcome: Progressing

## 2024-09-19 NOTE — ED NOTES
Patient resting in bed. Respirations easy and unlabored. No distress noted. Call light within reach. Requesting dose of morphine. Denies needs

## 2024-09-19 NOTE — PROGRESS NOTES
Hospitalist Progress Note  Internal Medicine Resident      Patient: Dixon Conn 33 y.o. female      Unit/Bed: 8A-20/020-A    Admit Date: 9/18/2024      ASSESSMENT AND PLAN  Active Problems  H.pylori Gastritis: Patient was recently admitted in hospital on 9/9-9/13 for acute pancreatitis flare.On admission had coffee-ground emesis with bright red streaks at that time. GI consulted and patient had EGD on 09/11, which showed prominent streaking erythema in the stomach. Biopsies were obtained. Due to worsening epigastric pain and episode of hematemesis reached out to GI who told her stool H. Pylori antigen was positive (09/12) and was advised to return to ED on 09/18 for further management.  On Triple Therapy: Flaygl 500 mg TID and Clarithromycin 500 mg and Pantoprazole 40 mg BID for total 14 days H.pylori treatment   On full fluid diet increase diet on 09/20 if tolerated. Continue on 0.9 NaCl for 12 hours due to poor intake. Revaluate need for fluids in morning.   Monitor H&H. If Hb <7 transfuse 1 unit RBC.   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 On Creon. On 9/12 patient underwent ultrasound-guided celiac plexus block due to severe pain. Noted on 09/17 had a dose of Creon and ate pizza which at time will triggers pancreatitis flare up. Lipase 26.9.   Started on Zenpep 20,000 units TID with meals.   Consult to Palliative care sent 09/19 for assistance of management of chronic pain and history of celiac plexus block.     Iron deficiency Anemia-Patient notes has iron deficiency anemia. Stated GI wanted iron levels retested. Ferritin 7, Iron 35, TIBC 389 and Iron % saturation 9. No significant change from prior result on 09/13. Start on iron once symptoms have improved.     Leukopenia: WBCs on arrival 4.1. 9/13 WBC 3.5. Repeat 09/19 3.5.   Continue to monitor with daily

## 2024-09-19 NOTE — CARE COORDINATION
09/19/24 1000   Readmission Assessment   Number of Days since last admission? 1-7 days   Previous Disposition Home with Family   Who is being Interviewed Patient   What was the patient's/caregiver's perception as to why they think they needed to return back to the hospital? Other (Comment)  (\"pancreatitis flare up\")   Did you visit your Primary Care Physician after you left the hospital, before you returned this time? No   Why weren't you able to visit your PCP? Did not have an appointment   Did you see a specialist, such as Cardiac, Pulmonary, Orthopedic Physician, etc. after you left the hospital? No   Who advised the patient to return to the hospital? Physician  (GI physician)   Does the patient report anything that got in the way of taking their medications? No   In our efforts to provide the best possible care to you and others like you, can you think of anything that we could have done to help you after you left the hospital the first time, so that you might not have needed to return so soon? Other (Comment)  (\"try off of the IV meds longer before sending home\")

## 2024-09-19 NOTE — PROGRESS NOTES
Pharmacy Medication History Note    List of current medications patient is taking is complete.    Source of information: Patient     Changes made to medication list:  Medications removed:  Famotidine (patient reports no longer taking)  Hydromorphone, metronidazole, and ondansetron (all prescribed 9/13/24 but patient reports she is not taking them)    Other notes:  Denies use of other OTC or herbal medications.    Allergies reviewed    Electronically signed by Miesha Romano RPH on 9/18/2024 at 9:07 PM

## 2024-09-19 NOTE — ED NOTES
Patient resting in bed. Respirations easy and unlabored. No distress noted. Call light within reach. Medication administered

## 2024-09-19 NOTE — ED NOTES
Patient resting in bed. Respirations easy and unlabored. No distress noted. Call light within reach. Denies needs

## 2024-09-19 NOTE — H&P
History & Physical  Internal Medicine Resident         Patient: Dixon Conn 33 y.o. female      : 1990  Date of Admission: 2024  Date of Service: Pt seen/examined on 24 and Admitted to Observation with expected LOS less than two midnights due to medical therapy.       ASSESSMENT AND PLAN  H. pylori gastritis: Patient previously hospitalized - for acute pancreatitis flare.  She had coffee-ground emesis with bright red streaks at that time.  GI evaluated to perform EGD on  that showed prominent streaking erythema in stomach.  Biopsies were obtained.  On  patient underwent ultrasound-guided celiac plexus block.  Patient was discharged on full liquid diet.  Patient got a dose of Creon and started having pizza on  and having tends to flareup return.  Patient reached out to GI who told her that stool H. pylori antigen was positive.  Patient was advised to return to ED on .  Patient has an allergy to vancomycin and Keflex.  Avoiding penicillins due to possible cross-reactivity.  Start Flagyl 500 mg 3 times daily  Start clarithromycin 500 mg twice daily  Start Protonix 40 mg twice daily  Start morphine panel for pain  Start Benadryl as patient states this is the only thing that helps her nausea.  Patient reports that Zofran/Phenergan/Compazine all worsen nausea  N.p.o.  Continue IV fluids with normal saline  Consider GI consult in a.m.  Patient reports that GI requested iron panel to be performed patient was admitted.  Recent iron panel on 2024 showed ferritin 9, iron 33, TIBC 383, iron % sat 11.  Repeat iron panel ordered per patient's insistence.  Do not expect significant improvement.  Leukopenia: WBCs on arrival 4.1.  Uptrending from previous hospitalization.   WBC 3.5.  Monitor CBC      Chronic Conditions (reviewed and stable unless otherwise stated)   Autoimmune pancreatitis: Patient reports this is currently being worked up outpatient.  Home medication:

## 2024-09-19 NOTE — CARE COORDINATION
Case Management Assessment Initial Evaluation    Date/Time of Evaluation: 9/19/2024 10:15 AM  Assessment Completed by: Jennifer Tineo RN    If patient is discharged prior to next notation, then this note serves as note for discharge by case management.    Patient Name: Dixon Conn                   YOB: 1990  Diagnosis: Hematemesis [K92.0]  Helicobacter pylori gastritis [K29.70, B96.81]  Hematemesis with nausea [K92.0]  Nausea and vomiting, unspecified vomiting type [R11.2]                   Date / Time: 9/18/2024  4:26 PM  Location: [facility]20/020     Patient Admission Status: Observation   Readmission Risk Low 0-14, Mod 15-19), High > 20: No data recorded  Current PCP: Schoen, Ashley, PA  Health Care Decision Makers:   Primary Decision Maker: Tom Conn - Spouse - [phone]    Additional Case Management Notes: Admit from ER with reports of + H pylori, nausea, vomiting of coffee-ground emesis, diarrhea, and abdominal pain. NPO.   GI panel ordered  IVF. PO Biaxin and PO Flagyl. IV Protonix. PRN IV Benadryl, Morphine, Zofran.     Procedures: none    Imaging: none    Patient Goals/Plan/Treatment Preferences: Planning to return home with her  at discharge. Denies any needs at this time.            09/19/24 1013   Service Assessment   Patient Orientation Alert and Oriented   Cognition Alert   History Provided By Patient   Primary Caregiver Self   Accompanied By/Relationship alone   Support Systems Spouse/Significant Other   Patient's Healthcare Decision Maker is: Patient Declined (Legal Next of Kin Remains as Decision Maker)   PCP Verified by CM Yes   Last Visit to PCP Within last 3 months   Prior Functional Level Independent in ADLs/IADLs   Current Functional Level Independent in ADLs/IADLs   Can patient return to prior living arrangement Yes   Ability to make needs known: Good   Family able to assist with home care needs: Yes   Would you like for me to discuss the discharge plan with any  other family members/significant others, and if so, who? No   Financial Resources Other (Comment)  (commercial)   Community Resources None   Discharge Planning   Type of Residence House   Living Arrangements Spouse/Significant Other   Current Services Prior To Admission None   Potential Assistance Needed N/A   DME Ordered? No   Potential Assistance Purchasing Medications No   Type of Home Care Services None   Patient expects to be discharged to: House   Services At/After Discharge   Transition of Care Consult (CM Consult) Discharge Planning   Services At/After Discharge None   Mode of Transport at Discharge Other (see comment)  (family)   Confirm Follow Up Transport Self   Condition of Participation: Discharge Planning   The Plan for Transition of Care is related to the following treatment goals: pain control

## 2024-09-20 PROBLEM — K85.00 IDIOPATHIC ACUTE PANCREATITIS WITHOUT INFECTION OR NECROSIS: Status: ACTIVE | Noted: 2024-09-20

## 2024-09-20 PROBLEM — A04.8 HELICOBACTER PYLORI (H. PYLORI) INFECTION: Status: ACTIVE | Noted: 2024-09-20

## 2024-09-20 LAB
ANION GAP SERPL CALC-SCNC: 13 MEQ/L (ref 8–16)
BUN SERPL-MCNC: 8 MG/DL (ref 7–22)
CALCIUM SERPL-MCNC: 8.6 MG/DL (ref 8.5–10.5)
CHLORIDE SERPL-SCNC: 106 MEQ/L (ref 98–111)
CO2 SERPL-SCNC: 21 MEQ/L (ref 23–33)
CREAT SERPL-MCNC: 0.6 MG/DL (ref 0.4–1.2)
DEPRECATED RDW RBC AUTO: 46.3 FL (ref 35–45)
ERYTHROCYTE [DISTWIDTH] IN BLOOD BY AUTOMATED COUNT: 14.5 % (ref 11.5–14.5)
GFR SERPL CREATININE-BSD FRML MDRD: > 90 ML/MIN/1.73M2
GLUCOSE SERPL-MCNC: 92 MG/DL (ref 70–108)
HCT VFR BLD AUTO: 33.7 % (ref 37–47)
HGB BLD-MCNC: 10.4 GM/DL (ref 12–16)
MCH RBC QN AUTO: 27.2 PG (ref 26–33)
MCHC RBC AUTO-ENTMCNC: 30.9 GM/DL (ref 32.2–35.5)
MCV RBC AUTO: 88 FL (ref 81–99)
PLATELET # BLD AUTO: 132 THOU/MM3 (ref 130–400)
PMV BLD AUTO: 12.7 FL (ref 9.4–12.4)
POTASSIUM SERPL-SCNC: 4.4 MEQ/L (ref 3.5–5.2)
RBC # BLD AUTO: 3.83 MILL/MM3 (ref 4.2–5.4)
SODIUM SERPL-SCNC: 140 MEQ/L (ref 135–145)
WBC # BLD AUTO: 3.2 THOU/MM3 (ref 4.8–10.8)

## 2024-09-20 PROCEDURE — 2580000003 HC RX 258

## 2024-09-20 PROCEDURE — 6360000002 HC RX W HCPCS

## 2024-09-20 PROCEDURE — 36415 COLL VENOUS BLD VENIPUNCTURE: CPT

## 2024-09-20 PROCEDURE — 6370000000 HC RX 637 (ALT 250 FOR IP)

## 2024-09-20 PROCEDURE — 6360000002 HC RX W HCPCS: Performed by: STUDENT IN AN ORGANIZED HEALTH CARE EDUCATION/TRAINING PROGRAM

## 2024-09-20 PROCEDURE — G0378 HOSPITAL OBSERVATION PER HR: HCPCS

## 2024-09-20 PROCEDURE — 80048 BASIC METABOLIC PNL TOTAL CA: CPT

## 2024-09-20 PROCEDURE — 99233 SBSQ HOSP IP/OBS HIGH 50: CPT | Performed by: STUDENT IN AN ORGANIZED HEALTH CARE EDUCATION/TRAINING PROGRAM

## 2024-09-20 PROCEDURE — 96376 TX/PRO/DX INJ SAME DRUG ADON: CPT

## 2024-09-20 PROCEDURE — 85027 COMPLETE CBC AUTOMATED: CPT

## 2024-09-20 RX ORDER — MORPHINE SULFATE 4 MG/ML
4 INJECTION, SOLUTION INTRAMUSCULAR; INTRAVENOUS
Status: DISCONTINUED | OUTPATIENT
Start: 2024-09-20 | End: 2024-09-22

## 2024-09-20 RX ORDER — MORPHINE SULFATE 4 MG/ML
4 INJECTION, SOLUTION INTRAMUSCULAR; INTRAVENOUS EVERY 4 HOURS PRN
Status: DISCONTINUED | OUTPATIENT
Start: 2024-09-20 | End: 2024-09-20

## 2024-09-20 RX ORDER — MORPHINE SULFATE 2 MG/ML
2 INJECTION, SOLUTION INTRAMUSCULAR; INTRAVENOUS
Status: DISCONTINUED | OUTPATIENT
Start: 2024-09-20 | End: 2024-09-22

## 2024-09-20 RX ORDER — MORPHINE SULFATE 2 MG/ML
2 INJECTION, SOLUTION INTRAMUSCULAR; INTRAVENOUS EVERY 4 HOURS PRN
Status: DISCONTINUED | OUTPATIENT
Start: 2024-09-20 | End: 2024-09-20

## 2024-09-20 RX ADMIN — MORPHINE SULFATE 4 MG: 4 INJECTION, SOLUTION INTRAMUSCULAR; INTRAVENOUS at 14:31

## 2024-09-20 RX ADMIN — METRONIDAZOLE 500 MG: 500 TABLET ORAL at 08:39

## 2024-09-20 RX ADMIN — PANCRELIPASE LIPASE, PANCRELIPASE PROTEASE, PANCRELIPASE AMYLASE 5000 UNITS: 5000; 17000; 24000 CAPSULE, DELAYED RELEASE ORAL at 08:39

## 2024-09-20 RX ADMIN — METRONIDAZOLE 500 MG: 500 TABLET ORAL at 02:19

## 2024-09-20 RX ADMIN — MORPHINE SULFATE 2 MG: 2 INJECTION, SOLUTION INTRAMUSCULAR; INTRAVENOUS at 18:32

## 2024-09-20 RX ADMIN — MORPHINE SULFATE 4 MG: 4 INJECTION, SOLUTION INTRAMUSCULAR; INTRAVENOUS at 12:21

## 2024-09-20 RX ADMIN — CLARITHROMYCIN 500 MG: 500 TABLET ORAL at 12:51

## 2024-09-20 RX ADMIN — MORPHINE SULFATE 4 MG: 4 INJECTION, SOLUTION INTRAMUSCULAR; INTRAVENOUS at 10:26

## 2024-09-20 RX ADMIN — MORPHINE SULFATE 2 MG: 2 INJECTION, SOLUTION INTRAMUSCULAR; INTRAVENOUS at 20:20

## 2024-09-20 RX ADMIN — MORPHINE SULFATE 4 MG: 4 INJECTION, SOLUTION INTRAMUSCULAR; INTRAVENOUS at 04:09

## 2024-09-20 RX ADMIN — METRONIDAZOLE 500 MG: 500 TABLET ORAL at 16:27

## 2024-09-20 RX ADMIN — PANCRELIPASE LIPASE, PANCRELIPASE PROTEASE, PANCRELIPASE AMYLASE 20000 UNITS: 20000; 63000; 84000 CAPSULE, DELAYED RELEASE ORAL at 12:21

## 2024-09-20 RX ADMIN — MORPHINE SULFATE 4 MG: 4 INJECTION, SOLUTION INTRAMUSCULAR; INTRAVENOUS at 06:36

## 2024-09-20 RX ADMIN — MORPHINE SULFATE 4 MG: 4 INJECTION, SOLUTION INTRAMUSCULAR; INTRAVENOUS at 16:27

## 2024-09-20 RX ADMIN — SODIUM CHLORIDE, PRESERVATIVE FREE 10 ML: 5 INJECTION INTRAVENOUS at 20:20

## 2024-09-20 RX ADMIN — CLARITHROMYCIN 500 MG: 500 TABLET ORAL at 02:19

## 2024-09-20 RX ADMIN — MORPHINE SULFATE 4 MG: 4 INJECTION, SOLUTION INTRAMUSCULAR; INTRAVENOUS at 22:29

## 2024-09-20 RX ADMIN — PANCRELIPASE LIPASE, PANCRELIPASE PROTEASE, PANCRELIPASE AMYLASE 5000 UNITS: 5000; 17000; 24000 CAPSULE, DELAYED RELEASE ORAL at 12:21

## 2024-09-20 RX ADMIN — PANCRELIPASE LIPASE, PANCRELIPASE PROTEASE, PANCRELIPASE AMYLASE 5000 UNITS: 5000; 17000; 24000 CAPSULE, DELAYED RELEASE ORAL at 16:27

## 2024-09-20 RX ADMIN — PANCRELIPASE LIPASE, PANCRELIPASE PROTEASE, PANCRELIPASE AMYLASE 20000 UNITS: 20000; 63000; 84000 CAPSULE, DELAYED RELEASE ORAL at 08:39

## 2024-09-20 RX ADMIN — DIPHENHYDRAMINE HYDROCHLORIDE 50 MG: 50 INJECTION INTRAMUSCULAR; INTRAVENOUS at 10:26

## 2024-09-20 RX ADMIN — MORPHINE SULFATE 4 MG: 4 INJECTION, SOLUTION INTRAMUSCULAR; INTRAVENOUS at 02:19

## 2024-09-20 RX ADMIN — DIPHENHYDRAMINE HYDROCHLORIDE 50 MG: 50 INJECTION INTRAMUSCULAR; INTRAVENOUS at 04:09

## 2024-09-20 RX ADMIN — SODIUM CHLORIDE, PRESERVATIVE FREE 10 ML: 5 INJECTION INTRAVENOUS at 22:30

## 2024-09-20 RX ADMIN — PANTOPRAZOLE SODIUM 40 MG: 40 INJECTION, POWDER, FOR SOLUTION INTRAVENOUS at 08:39

## 2024-09-20 RX ADMIN — DIPHENHYDRAMINE HYDROCHLORIDE 50 MG: 50 INJECTION INTRAMUSCULAR; INTRAVENOUS at 22:29

## 2024-09-20 RX ADMIN — DIPHENHYDRAMINE HYDROCHLORIDE 50 MG: 50 INJECTION INTRAMUSCULAR; INTRAVENOUS at 16:31

## 2024-09-20 RX ADMIN — PANTOPRAZOLE SODIUM 40 MG: 40 INJECTION, POWDER, FOR SOLUTION INTRAVENOUS at 20:20

## 2024-09-20 RX ADMIN — MORPHINE SULFATE 4 MG: 4 INJECTION, SOLUTION INTRAMUSCULAR; INTRAVENOUS at 08:39

## 2024-09-20 RX ADMIN — PANCRELIPASE LIPASE, PANCRELIPASE PROTEASE, PANCRELIPASE AMYLASE 20000 UNITS: 20000; 63000; 84000 CAPSULE, DELAYED RELEASE ORAL at 16:27

## 2024-09-20 ASSESSMENT — PAIN SCALES - GENERAL
PAINLEVEL_OUTOF10: 5
PAINLEVEL_OUTOF10: 2
PAINLEVEL_OUTOF10: 8
PAINLEVEL_OUTOF10: 6
PAINLEVEL_OUTOF10: 7
PAINLEVEL_OUTOF10: 7
PAINLEVEL_OUTOF10: 5
PAINLEVEL_OUTOF10: 7
PAINLEVEL_OUTOF10: 7
PAINLEVEL_OUTOF10: 6
PAINLEVEL_OUTOF10: 8
PAINLEVEL_OUTOF10: 5
PAINLEVEL_OUTOF10: 7
PAINLEVEL_OUTOF10: 9
PAINLEVEL_OUTOF10: 6
PAINLEVEL_OUTOF10: 5
PAINLEVEL_OUTOF10: 8
PAINLEVEL_OUTOF10: 5
PAINLEVEL_OUTOF10: 3
PAINLEVEL_OUTOF10: 2
PAINLEVEL_OUTOF10: 7
PAINLEVEL_OUTOF10: 0

## 2024-09-20 ASSESSMENT — PAIN DESCRIPTION - DESCRIPTORS: DESCRIPTORS: SHARP

## 2024-09-20 ASSESSMENT — PAIN DESCRIPTION - LOCATION
LOCATION: ABDOMEN

## 2024-09-20 ASSESSMENT — PAIN SCALES - WONG BAKER
WONGBAKER_NUMERICALRESPONSE: HURTS A LITTLE BIT
WONGBAKER_NUMERICALRESPONSE: NO HURT
WONGBAKER_NUMERICALRESPONSE: HURTS A LITTLE BIT

## 2024-09-20 NOTE — PLAN OF CARE
Problem: Discharge Planning  Goal: Discharge to home or other facility with appropriate resources  Outcome: Progressing  Flowsheets (Taken 9/20/2024 1544)  Discharge to home or other facility with appropriate resources: Identify barriers to discharge with patient and caregiver     Problem: Pain  Goal: Verbalizes/displays adequate comfort level or baseline comfort level  Outcome: Progressing

## 2024-09-20 NOTE — PROGRESS NOTES
Hospitalist Progress Note    Patient:  Dixon Conn      Unit/Bed:8A-20/020-A    YOB: 1990    MRN: [id number]       Acct: [id number]     PCP: Schoen, Ashley, PA    Date of Admission: 9/18/2024    Assessment/Plan:    H. pylori gastritis: Presented intractable abdominal pain associated coffee-ground emesis, bright red streaks.  GI consulted, EGD 9/11 showed prominent streaking erythema in stomach.  Biopsies obtained.  Stool H. pylori +9/12, advised to return to ED 9/18.  Started on triple therapy with Flagyl 500 mg p.o. 3 times daily, clindamycin 500 mg p.o. daily and pantoprazole 40 mg p.o. twice daily x 14.  9/20 on full liquid diet, escalate as tolerated.  Started on IVF.  Monitoring H&H.  Continue with antiemetics.  Discussed need to wean off of IV pain meds, voiced understanding.  Will transition to oral extended release once diet escalated.  Autoimmune pancreatitis: Being evaluated outpatient for this.   History of autoimmune pancreatitis flares.  Underwent ultrasound-guided celiac plexus block secondary to intractable pain 9/12.  Presenting lipase 26.1. On Creon.  Palliative care consulted on 319 for assistance in chronic pain and history of celiac plexus block.  HARDY: In the setting of autoimmune pancreatitis and H. pylori gastritis, see above.  Iron studies indicative of severe HARDY with ferritin 7, iron 35, TIBC 389, iron sat 9%.  Per GI okay to start iron supplementation once symptoms improve  Leukopenia: Likely reactive.  Monitor WBC with daily CBC.  Dysautonomia: With history of labile blood pressures.  Advised to decrease dependence on IV pain medication.  Voiced understanding.  Monitor blood pressure.        Expected discharge date: TBD    Disposition:    [x] Home       [] TCU       [] Rehab       [] Psych       [] SNF       [] Long Term Care Facility       [] Other-    Chief Complaint: Abdominal pain    Hospital Course:   The patient is a 33-year-old female with a history of  128* 132     Recent Labs     09/18/24 2055 09/19/24  0552 09/20/24  0625    140 140   K 4.4 3.6 4.4    106 106   CO2 21* 23 21*   BUN 7 8 8   CREATININE 0.6 0.7 0.6   CALCIUM 9.1 8.2* 8.6     Recent Labs     09/18/24 2055   AST 26   ALT 23   BILITOT 0.6   ALKPHOS 99     No results for input(s): \"INR\" in the last 72 hours.  No results for input(s): \"CKTOTAL\", \"TROPONINI\" in the last 72 hours.    Microbiology:      Urinalysis:      Lab Results   Component Value Date/Time    NITRU NEGATIVE 09/18/2024 05:00 PM    BLOODU NEGATIVE 09/18/2024 05:00 PM    GLUCOSEU NEGATIVE 09/18/2024 05:00 PM       Radiology:  No orders to display       DVT prophylaxis: [] Lovenox                                 [x] SCDs                                 [] SQ Heparin                                 [] Encourage ambulation           [] Already on Anticoagulation     Code Status: Full Code    PT/OT Eval Status: N/A    Tele:   [x] yes             [] no    Electronically signed by Remy Arias DO on 9/20/2024 at 12:07 PM

## 2024-09-20 NOTE — CARE COORDINATION
9/20/24, 7:45 AM EDT    DISCHARGE ON GOING EVALUATION    [facility] day: 0  Location: 8A-20/020-A Reason for admit: Hematemesis [K92.0]  Helicobacter pylori gastritis [K29.70, B96.81]  Hematemesis with nausea [K92.0]  Nausea and vomiting, unspecified vomiting type [R11.2]     Procedures: none    Imaging since last note: none    Barriers to Discharge:  Full liquid diet.  Consult to palliative care.  Pain control.     PCP: Schoen, Ashley, PA  No data recorded  Patient Goals/Plan/Treatment Preferences: Planning to return home with her  at discharge. Denies needs.     Potential discharge home over the weekend.   9/20/24, 1:50 PM EDT    Patient goals/plan/ treatment preferences discussed by  and .  Patient goals/plan/ treatment preferences reviewed with patient/ family.  Patient/ family verbalize understanding of discharge plan and are in agreement with goal/plan/treatment preferences.  Understanding was demonstrated using the teach back method.  AVS provided by RN at time of discharge, which includes all necessary medical information pertaining to the patients current course of illness, treatment, post-discharge goals of care, and treatment preferences.     Services At/After Discharge: None

## 2024-09-21 ENCOUNTER — APPOINTMENT (OUTPATIENT)
Dept: CT IMAGING | Age: 34
DRG: 378 | End: 2024-09-21
Payer: COMMERCIAL

## 2024-09-21 LAB
ANION GAP SERPL CALC-SCNC: 13 MEQ/L (ref 8–16)
BUN SERPL-MCNC: 5 MG/DL (ref 7–22)
CALCIUM SERPL-MCNC: 8.7 MG/DL (ref 8.5–10.5)
CHLORIDE SERPL-SCNC: 106 MEQ/L (ref 98–111)
CO2 SERPL-SCNC: 18 MEQ/L (ref 23–33)
CREAT SERPL-MCNC: 0.7 MG/DL (ref 0.4–1.2)
DEPRECATED RDW RBC AUTO: 45.3 FL (ref 35–45)
ERYTHROCYTE [DISTWIDTH] IN BLOOD BY AUTOMATED COUNT: 14.3 % (ref 11.5–14.5)
GFR SERPL CREATININE-BSD FRML MDRD: > 90 ML/MIN/1.73M2
GLUCOSE SERPL-MCNC: 79 MG/DL (ref 70–108)
HCT VFR BLD AUTO: 38 % (ref 37–47)
HGB BLD-MCNC: 11.8 GM/DL (ref 12–16)
MCH RBC QN AUTO: 27.1 PG (ref 26–33)
MCHC RBC AUTO-ENTMCNC: 31.1 GM/DL (ref 32.2–35.5)
MCV RBC AUTO: 87.2 FL (ref 81–99)
PLATELET # BLD AUTO: 142 THOU/MM3 (ref 130–400)
PMV BLD AUTO: 12.8 FL (ref 9.4–12.4)
POTASSIUM SERPL-SCNC: 4 MEQ/L (ref 3.5–5.2)
RBC # BLD AUTO: 4.36 MILL/MM3 (ref 4.2–5.4)
SODIUM SERPL-SCNC: 137 MEQ/L (ref 135–145)
WBC # BLD AUTO: 3.1 THOU/MM3 (ref 4.8–10.8)

## 2024-09-21 PROCEDURE — 96376 TX/PRO/DX INJ SAME DRUG ADON: CPT

## 2024-09-21 PROCEDURE — 6360000002 HC RX W HCPCS: Performed by: STUDENT IN AN ORGANIZED HEALTH CARE EDUCATION/TRAINING PROGRAM

## 2024-09-21 PROCEDURE — 85027 COMPLETE CBC AUTOMATED: CPT

## 2024-09-21 PROCEDURE — 6360000002 HC RX W HCPCS

## 2024-09-21 PROCEDURE — 6370000000 HC RX 637 (ALT 250 FOR IP)

## 2024-09-21 PROCEDURE — 99232 SBSQ HOSP IP/OBS MODERATE 35: CPT | Performed by: STUDENT IN AN ORGANIZED HEALTH CARE EDUCATION/TRAINING PROGRAM

## 2024-09-21 PROCEDURE — 71260 CT THORAX DX C+: CPT

## 2024-09-21 PROCEDURE — 80048 BASIC METABOLIC PNL TOTAL CA: CPT

## 2024-09-21 PROCEDURE — 6360000004 HC RX CONTRAST MEDICATION: Performed by: STUDENT IN AN ORGANIZED HEALTH CARE EDUCATION/TRAINING PROGRAM

## 2024-09-21 PROCEDURE — G0378 HOSPITAL OBSERVATION PER HR: HCPCS

## 2024-09-21 PROCEDURE — 36415 COLL VENOUS BLD VENIPUNCTURE: CPT

## 2024-09-21 RX ORDER — IOPAMIDOL 755 MG/ML
80 INJECTION, SOLUTION INTRAVASCULAR
Status: COMPLETED | OUTPATIENT
Start: 2024-09-21 | End: 2024-09-21

## 2024-09-21 RX ADMIN — METRONIDAZOLE 500 MG: 500 TABLET ORAL at 00:21

## 2024-09-21 RX ADMIN — MORPHINE SULFATE 4 MG: 4 INJECTION, SOLUTION INTRAMUSCULAR; INTRAVENOUS at 21:55

## 2024-09-21 RX ADMIN — MORPHINE SULFATE 2 MG: 2 INJECTION, SOLUTION INTRAMUSCULAR; INTRAVENOUS at 14:59

## 2024-09-21 RX ADMIN — DIPHENHYDRAMINE HYDROCHLORIDE 50 MG: 50 INJECTION INTRAMUSCULAR; INTRAVENOUS at 04:04

## 2024-09-21 RX ADMIN — PANCRELIPASE LIPASE, PANCRELIPASE PROTEASE, PANCRELIPASE AMYLASE 5000 UNITS: 5000; 17000; 24000 CAPSULE, DELAYED RELEASE ORAL at 16:22

## 2024-09-21 RX ADMIN — PANCRELIPASE LIPASE, PANCRELIPASE PROTEASE, PANCRELIPASE AMYLASE 5000 UNITS: 5000; 17000; 24000 CAPSULE, DELAYED RELEASE ORAL at 08:08

## 2024-09-21 RX ADMIN — MORPHINE SULFATE 4 MG: 4 INJECTION, SOLUTION INTRAMUSCULAR; INTRAVENOUS at 06:06

## 2024-09-21 RX ADMIN — ONDANSETRON 4 MG: 2 INJECTION INTRAMUSCULAR; INTRAVENOUS at 08:07

## 2024-09-21 RX ADMIN — DIPHENHYDRAMINE HYDROCHLORIDE 50 MG: 50 INJECTION INTRAMUSCULAR; INTRAVENOUS at 21:55

## 2024-09-21 RX ADMIN — METRONIDAZOLE 500 MG: 500 TABLET ORAL at 16:22

## 2024-09-21 RX ADMIN — PANCRELIPASE LIPASE, PANCRELIPASE PROTEASE, PANCRELIPASE AMYLASE 20000 UNITS: 20000; 63000; 84000 CAPSULE, DELAYED RELEASE ORAL at 16:21

## 2024-09-21 RX ADMIN — MORPHINE SULFATE 2 MG: 2 INJECTION, SOLUTION INTRAMUSCULAR; INTRAVENOUS at 00:21

## 2024-09-21 RX ADMIN — MORPHINE SULFATE 2 MG: 2 INJECTION, SOLUTION INTRAMUSCULAR; INTRAVENOUS at 16:53

## 2024-09-21 RX ADMIN — MORPHINE SULFATE 2 MG: 2 INJECTION, SOLUTION INTRAMUSCULAR; INTRAVENOUS at 12:22

## 2024-09-21 RX ADMIN — MORPHINE SULFATE 4 MG: 4 INJECTION, SOLUTION INTRAMUSCULAR; INTRAVENOUS at 23:16

## 2024-09-21 RX ADMIN — MORPHINE SULFATE 4 MG: 4 INJECTION, SOLUTION INTRAMUSCULAR; INTRAVENOUS at 08:07

## 2024-09-21 RX ADMIN — CLARITHROMYCIN 500 MG: 500 TABLET ORAL at 12:35

## 2024-09-21 RX ADMIN — PANTOPRAZOLE SODIUM 40 MG: 40 INJECTION, POWDER, FOR SOLUTION INTRAVENOUS at 08:07

## 2024-09-21 RX ADMIN — IOPAMIDOL 80 ML: 755 INJECTION, SOLUTION INTRAVENOUS at 23:07

## 2024-09-21 RX ADMIN — PANCRELIPASE LIPASE, PANCRELIPASE PROTEASE, PANCRELIPASE AMYLASE 20000 UNITS: 20000; 63000; 84000 CAPSULE, DELAYED RELEASE ORAL at 08:08

## 2024-09-21 RX ADMIN — CLARITHROMYCIN 500 MG: 500 TABLET ORAL at 00:25

## 2024-09-21 RX ADMIN — MORPHINE SULFATE 4 MG: 4 INJECTION, SOLUTION INTRAMUSCULAR; INTRAVENOUS at 18:56

## 2024-09-21 RX ADMIN — MORPHINE SULFATE 2 MG: 2 INJECTION, SOLUTION INTRAMUSCULAR; INTRAVENOUS at 04:04

## 2024-09-21 RX ADMIN — ONDANSETRON 4 MG: 2 INJECTION INTRAMUSCULAR; INTRAVENOUS at 14:58

## 2024-09-21 RX ADMIN — MORPHINE SULFATE 4 MG: 4 INJECTION, SOLUTION INTRAMUSCULAR; INTRAVENOUS at 02:07

## 2024-09-21 RX ADMIN — DIPHENHYDRAMINE HYDROCHLORIDE 50 MG: 50 INJECTION INTRAMUSCULAR; INTRAVENOUS at 16:21

## 2024-09-21 RX ADMIN — MORPHINE SULFATE 2 MG: 2 INJECTION, SOLUTION INTRAMUSCULAR; INTRAVENOUS at 10:01

## 2024-09-21 RX ADMIN — PANTOPRAZOLE SODIUM 40 MG: 40 INJECTION, POWDER, FOR SOLUTION INTRAVENOUS at 21:55

## 2024-09-21 RX ADMIN — DIPHENHYDRAMINE HYDROCHLORIDE 50 MG: 50 INJECTION INTRAMUSCULAR; INTRAVENOUS at 10:01

## 2024-09-21 ASSESSMENT — PAIN SCALES - WONG BAKER
WONGBAKER_NUMERICALRESPONSE: NO HURT

## 2024-09-21 ASSESSMENT — PAIN DESCRIPTION - DESCRIPTORS
DESCRIPTORS: ACHING

## 2024-09-21 ASSESSMENT — PAIN DESCRIPTION - LOCATION
LOCATION: ABDOMEN
LOCATION: NECK
LOCATION: ABDOMEN
LOCATION: ABDOMEN;SHOULDER
LOCATION: ABDOMEN
LOCATION: SHOULDER
LOCATION: ABDOMEN

## 2024-09-21 ASSESSMENT — PAIN SCALES - GENERAL
PAINLEVEL_OUTOF10: 5
PAINLEVEL_OUTOF10: 5
PAINLEVEL_OUTOF10: 6
PAINLEVEL_OUTOF10: 8
PAINLEVEL_OUTOF10: 8
PAINLEVEL_OUTOF10: 5
PAINLEVEL_OUTOF10: 4
PAINLEVEL_OUTOF10: 6
PAINLEVEL_OUTOF10: 6
PAINLEVEL_OUTOF10: 8
PAINLEVEL_OUTOF10: 5
PAINLEVEL_OUTOF10: 6
PAINLEVEL_OUTOF10: 8
PAINLEVEL_OUTOF10: 8
PAINLEVEL_OUTOF10: 7
PAINLEVEL_OUTOF10: 8
PAINLEVEL_OUTOF10: 0
PAINLEVEL_OUTOF10: 8
PAINLEVEL_OUTOF10: 0
PAINLEVEL_OUTOF10: 6
PAINLEVEL_OUTOF10: 10
PAINLEVEL_OUTOF10: 0
PAINLEVEL_OUTOF10: 8
PAINLEVEL_OUTOF10: 8

## 2024-09-21 ASSESSMENT — PAIN DESCRIPTION - ORIENTATION
ORIENTATION: MID
ORIENTATION: MID
ORIENTATION: LEFT
ORIENTATION: MID

## 2024-09-21 ASSESSMENT — PAIN DESCRIPTION - FREQUENCY: FREQUENCY: CONTINUOUS

## 2024-09-21 ASSESSMENT — PAIN DESCRIPTION - ONSET: ONSET: ON-GOING

## 2024-09-21 ASSESSMENT — PAIN - FUNCTIONAL ASSESSMENT: PAIN_FUNCTIONAL_ASSESSMENT: PREVENTS OR INTERFERES SOME ACTIVE ACTIVITIES AND ADLS

## 2024-09-21 ASSESSMENT — PAIN DESCRIPTION - PAIN TYPE: TYPE: ACUTE PAIN

## 2024-09-21 NOTE — PROGRESS NOTES
Hospitalist Progress Note    Patient:  Dixon Conn      Unit/Bed:8A-20/020-A    YOB: 1990    MRN: [id number]       Acct: [id number]     PCP: Schoen, Ashley, PA    Date of Admission: 9/18/2024    Assessment/Plan:    H. pylori gastritis: Presented intractable abdominal pain associated coffee-ground emesis, bright red streaks.  GI consulted, EGD 9/11 showed prominent streaking erythema in stomach.  Biopsies obtained.  Stool H. pylori +9/12, advised to return to ED 9/18.  Started on triple therapy with Flagyl 500 mg p.o. 3 times daily, clindamycin 500 mg p.o. daily and pantoprazole 40 mg p.o. twice daily x 14.  9/20 on full liquid diet, escalate as tolerated.  Started on IVF.  Monitoring H&H.  Continue with antiemetics.  Discussed need to wean off of IV pain meds, voiced understanding.  Will transition to oral extended release once diet escalated. 9/21 unable to tolerate oral.   Autoimmune pancreatitis: Being evaluated outpatient for this.   History of autoimmune pancreatitis flares.  Underwent ultrasound-guided celiac plexus block secondary to intractable pain 9/12.  Presenting lipase 26.1. On Creon.  Palliative care consulted on 319 for assistance in chronic pain and history of celiac plexus block.  HARDY: In the setting of autoimmune pancreatitis and H. pylori gastritis, see above.  Iron studies indicative of severe HARDY with ferritin 7, iron 35, TIBC 389, iron sat 9%.  Per GI okay to start iron supplementation once symptoms improve  Leukopenia: Likely reactive.  Monitor WBC with daily CBC.  Dysautonomia: With history of labile blood pressures.  Advised to decrease dependence on IV pain medication.  Voiced understanding.  Monitor blood pressure.        Expected discharge date: TBD    Disposition:    [x] Home       [] TCU       [] Rehab       [] Psych       [] SNF       [] Long Term Care Facility       [] Other-    Chief Complaint: Abdominal pain    Hospital Course:   The patient is a 33-year-old  bilaterally       Labs:   Recent Labs     09/18/24  1810 09/19/24  0552 09/20/24  0801   WBC 4.1* 3.4* 3.2*   HGB 12.3 10.5* 10.4*   HCT 38.3 32.9* 33.7*    128* 132     Recent Labs     09/18/24 2055 09/19/24  0552 09/20/24  0625    140 140   K 4.4 3.6 4.4    106 106   CO2 21* 23 21*   BUN 7 8 8   CREATININE 0.6 0.7 0.6   CALCIUM 9.1 8.2* 8.6     Recent Labs     09/18/24 2055   AST 26   ALT 23   BILITOT 0.6   ALKPHOS 99     No results for input(s): \"INR\" in the last 72 hours.  No results for input(s): \"CKTOTAL\", \"TROPONINI\" in the last 72 hours.    Microbiology:      Urinalysis:      Lab Results   Component Value Date/Time    NITRU NEGATIVE 09/18/2024 05:00 PM    BLOODU NEGATIVE 09/18/2024 05:00 PM    GLUCOSEU NEGATIVE 09/18/2024 05:00 PM       Radiology:  No orders to display       DVT prophylaxis: [] Lovenox                                 [x] SCDs                                 [] SQ Heparin                                 [] Encourage ambulation           [] Already on Anticoagulation     Code Status: Full Code    PT/OT Eval Status: N/A    Tele:   [x] yes             [] no    Electronically signed by Remy Arias DO on 9/21/2024 at 8:31 AM

## 2024-09-22 LAB
CK SERPL-CCNC: 559 U/L (ref 30–135)
CRP SERPL-MCNC: < 0.3 MG/DL (ref 0–1)
ERYTHROCYTE [SEDIMENTATION RATE] IN BLOOD BY WESTERGREN METHOD: 18 MM/HR (ref 0–20)
PROCALCITONIN SERPL IA-MCNC: 0.03 NG/ML (ref 0.01–0.09)

## 2024-09-22 PROCEDURE — 6360000002 HC RX W HCPCS: Performed by: STUDENT IN AN ORGANIZED HEALTH CARE EDUCATION/TRAINING PROGRAM

## 2024-09-22 PROCEDURE — 84145 PROCALCITONIN (PCT): CPT

## 2024-09-22 PROCEDURE — 99232 SBSQ HOSP IP/OBS MODERATE 35: CPT | Performed by: STUDENT IN AN ORGANIZED HEALTH CARE EDUCATION/TRAINING PROGRAM

## 2024-09-22 PROCEDURE — 2580000003 HC RX 258

## 2024-09-22 PROCEDURE — 6360000002 HC RX W HCPCS

## 2024-09-22 PROCEDURE — 96376 TX/PRO/DX INJ SAME DRUG ADON: CPT

## 2024-09-22 PROCEDURE — 36415 COLL VENOUS BLD VENIPUNCTURE: CPT

## 2024-09-22 PROCEDURE — 6370000000 HC RX 637 (ALT 250 FOR IP): Performed by: STUDENT IN AN ORGANIZED HEALTH CARE EDUCATION/TRAINING PROGRAM

## 2024-09-22 PROCEDURE — 6370000000 HC RX 637 (ALT 250 FOR IP)

## 2024-09-22 PROCEDURE — 85651 RBC SED RATE NONAUTOMATED: CPT

## 2024-09-22 PROCEDURE — 82550 ASSAY OF CK (CPK): CPT

## 2024-09-22 PROCEDURE — G0378 HOSPITAL OBSERVATION PER HR: HCPCS

## 2024-09-22 PROCEDURE — 86140 C-REACTIVE PROTEIN: CPT

## 2024-09-22 PROCEDURE — 87641 MR-STAPH DNA AMP PROBE: CPT

## 2024-09-22 PROCEDURE — 96365 THER/PROPH/DIAG IV INF INIT: CPT

## 2024-09-22 RX ORDER — MORPHINE SULFATE 4 MG/ML
4 INJECTION, SOLUTION INTRAMUSCULAR; INTRAVENOUS EVERY 4 HOURS PRN
Status: DISCONTINUED | OUTPATIENT
Start: 2024-09-22 | End: 2024-09-23

## 2024-09-22 RX ORDER — DIPHENHYDRAMINE HYDROCHLORIDE 50 MG/ML
50 INJECTION INTRAMUSCULAR; INTRAVENOUS EVERY 4 HOURS PRN
Status: DISCONTINUED | OUTPATIENT
Start: 2024-09-22 | End: 2024-09-23

## 2024-09-22 RX ORDER — MORPHINE SULFATE 2 MG/ML
2 INJECTION, SOLUTION INTRAMUSCULAR; INTRAVENOUS EVERY 4 HOURS PRN
Status: DISCONTINUED | OUTPATIENT
Start: 2024-09-22 | End: 2024-09-23

## 2024-09-22 RX ORDER — OXYCODONE HYDROCHLORIDE 5 MG/1
5 TABLET ORAL EVERY 4 HOURS PRN
Status: DISCONTINUED | OUTPATIENT
Start: 2024-09-22 | End: 2024-09-26

## 2024-09-22 RX ORDER — OXYCODONE HYDROCHLORIDE 5 MG/1
10 TABLET ORAL EVERY 4 HOURS PRN
Status: DISCONTINUED | OUTPATIENT
Start: 2024-09-22 | End: 2024-09-26

## 2024-09-22 RX ORDER — LORAZEPAM 2 MG/ML
1 INJECTION INTRAMUSCULAR PRN
Status: COMPLETED | OUTPATIENT
Start: 2024-09-22 | End: 2024-09-23

## 2024-09-22 RX ADMIN — DIPHENHYDRAMINE HYDROCHLORIDE 50 MG: 50 INJECTION INTRAMUSCULAR; INTRAVENOUS at 18:42

## 2024-09-22 RX ADMIN — PANCRELIPASE LIPASE, PANCRELIPASE PROTEASE, PANCRELIPASE AMYLASE 5000 UNITS: 5000; 17000; 24000 CAPSULE, DELAYED RELEASE ORAL at 09:21

## 2024-09-22 RX ADMIN — SODIUM CHLORIDE, PRESERVATIVE FREE 10 ML: 5 INJECTION INTRAVENOUS at 09:21

## 2024-09-22 RX ADMIN — PANCRELIPASE LIPASE, PANCRELIPASE PROTEASE, PANCRELIPASE AMYLASE 20000 UNITS: 20000; 63000; 84000 CAPSULE, DELAYED RELEASE ORAL at 09:21

## 2024-09-22 RX ADMIN — MORPHINE SULFATE 2 MG: 2 INJECTION, SOLUTION INTRAMUSCULAR; INTRAVENOUS at 04:20

## 2024-09-22 RX ADMIN — DIPHENHYDRAMINE HYDROCHLORIDE 50 MG: 50 INJECTION INTRAMUSCULAR; INTRAVENOUS at 10:18

## 2024-09-22 RX ADMIN — DIPHENHYDRAMINE HYDROCHLORIDE 50 MG: 50 INJECTION INTRAMUSCULAR; INTRAVENOUS at 14:42

## 2024-09-22 RX ADMIN — CLARITHROMYCIN 500 MG: 500 TABLET ORAL at 01:24

## 2024-09-22 RX ADMIN — PANCRELIPASE LIPASE, PANCRELIPASE PROTEASE, PANCRELIPASE AMYLASE 5000 UNITS: 5000; 17000; 24000 CAPSULE, DELAYED RELEASE ORAL at 13:10

## 2024-09-22 RX ADMIN — OXYCODONE 10 MG: 5 TABLET ORAL at 18:42

## 2024-09-22 RX ADMIN — MORPHINE SULFATE 4 MG: 4 INJECTION, SOLUTION INTRAMUSCULAR; INTRAVENOUS at 16:49

## 2024-09-22 RX ADMIN — PANTOPRAZOLE SODIUM 40 MG: 40 INJECTION, POWDER, FOR SOLUTION INTRAVENOUS at 20:27

## 2024-09-22 RX ADMIN — PANCRELIPASE LIPASE, PANCRELIPASE PROTEASE, PANCRELIPASE AMYLASE 20000 UNITS: 20000; 63000; 84000 CAPSULE, DELAYED RELEASE ORAL at 16:49

## 2024-09-22 RX ADMIN — METRONIDAZOLE 500 MG: 500 TABLET ORAL at 16:49

## 2024-09-22 RX ADMIN — PANTOPRAZOLE SODIUM 40 MG: 40 INJECTION, POWDER, FOR SOLUTION INTRAVENOUS at 09:24

## 2024-09-22 RX ADMIN — MORPHINE SULFATE 4 MG: 4 INJECTION, SOLUTION INTRAMUSCULAR; INTRAVENOUS at 14:42

## 2024-09-22 RX ADMIN — PANCRELIPASE LIPASE, PANCRELIPASE PROTEASE, PANCRELIPASE AMYLASE 20000 UNITS: 20000; 63000; 84000 CAPSULE, DELAYED RELEASE ORAL at 13:10

## 2024-09-22 RX ADMIN — MORPHINE SULFATE 4 MG: 4 INJECTION, SOLUTION INTRAMUSCULAR; INTRAVENOUS at 11:31

## 2024-09-22 RX ADMIN — MORPHINE SULFATE 4 MG: 4 INJECTION, SOLUTION INTRAMUSCULAR; INTRAVENOUS at 20:27

## 2024-09-22 RX ADMIN — CLARITHROMYCIN 500 MG: 500 TABLET ORAL at 13:10

## 2024-09-22 RX ADMIN — CEFTRIAXONE SODIUM 1000 MG: 1 INJECTION, POWDER, FOR SOLUTION INTRAMUSCULAR; INTRAVENOUS at 01:24

## 2024-09-22 RX ADMIN — PANCRELIPASE LIPASE, PANCRELIPASE PROTEASE, PANCRELIPASE AMYLASE 5000 UNITS: 5000; 17000; 24000 CAPSULE, DELAYED RELEASE ORAL at 16:49

## 2024-09-22 RX ADMIN — DIPHENHYDRAMINE HYDROCHLORIDE 50 MG: 50 INJECTION INTRAMUSCULAR; INTRAVENOUS at 04:20

## 2024-09-22 RX ADMIN — METRONIDAZOLE 500 MG: 500 TABLET ORAL at 01:24

## 2024-09-22 RX ADMIN — METRONIDAZOLE 500 MG: 500 TABLET ORAL at 09:21

## 2024-09-22 RX ADMIN — MORPHINE SULFATE 4 MG: 4 INJECTION, SOLUTION INTRAMUSCULAR; INTRAVENOUS at 09:24

## 2024-09-22 ASSESSMENT — PAIN SCALES - GENERAL
PAINLEVEL_OUTOF10: 8
PAINLEVEL_OUTOF10: 4
PAINLEVEL_OUTOF10: 5
PAINLEVEL_OUTOF10: 4
PAINLEVEL_OUTOF10: 5
PAINLEVEL_OUTOF10: 0
PAINLEVEL_OUTOF10: 0
PAINLEVEL_OUTOF10: 5
PAINLEVEL_OUTOF10: 4
PAINLEVEL_OUTOF10: 8
PAINLEVEL_OUTOF10: 6
PAINLEVEL_OUTOF10: 8
PAINLEVEL_OUTOF10: 0
PAINLEVEL_OUTOF10: 4
PAINLEVEL_OUTOF10: 4
PAINLEVEL_OUTOF10: 6
PAINLEVEL_OUTOF10: 4
PAINLEVEL_OUTOF10: 8
PAINLEVEL_OUTOF10: 4

## 2024-09-22 ASSESSMENT — PAIN SCALES - WONG BAKER
WONGBAKER_NUMERICALRESPONSE: NO HURT

## 2024-09-22 ASSESSMENT — PAIN DESCRIPTION - LOCATION
LOCATION: SHOULDER
LOCATION: SHOULDER;ABDOMEN

## 2024-09-22 NOTE — SIGNIFICANT EVENT
Notified at 1953 that patient had new left shoulder swelling that was not present yesterday per nurse report.  Evaluated patient.  Area of swelling, erythema, warmth to left superior and posterior shoulder. Picture in media. Patient states that this just developed in the last several hours.  Feels that is causing numbness in her left arm.  Significant pain.  Patient states that she has history of \"spinal abscess\" which developed with similar symptoms and was only found with MRI.  Unable to find anything in chart related to this.  CT scan was ordered which shows edema in the subcutaneous fat of the neck and upper shoulders, more prominent on the left than right and consistent with diffuse cellulitis.  No fluid collection or abscess.  Patient has already been on clarithromycin since 9/18.  History of Keflex allergy but patient did tolerate Rocephin last admission. Rocephin ordered.         Electronically signed by OZZIE Rea CNP on 9/22/2024 at 1:15 AM

## 2024-09-22 NOTE — CONSULTS
CONSULTATION NOTE :ID       Patient - Dixon Conn,  Age - 33 y.o.    - 1990      Room Number - 8A-20/020-A   MRN -  [id number]   [facility] # - [id number]  Date of Admission -  2024  4:26 PM  Patient's PCP: Schoen, Ashley, PA     Requesting Physician: Remy Arias DO    REASON FOR CONSULTATION   Swelling over the left shoulder area  CHIEF COMPLAINT   [place] and abdominal pain    HISTORY OF PRESENT ILLNESS       This is a  33 y.o. female who was admitted to the hospital with a chief complaints of nausea and abdominal pain. She has hx of chronic abdominal pain related to pancreatities. She was recently discharged from the facility with similar complaints. She has been on treatment for H.pylori gastrities. She was having nasuea and vomiting and reported of coffee ground emesis and was advised to get admitted. She is originally from [place] moved to [place]. Review of care everywhere shows she has multiple visits to ED for abdominal pain. She has been getting morphine 2-4 mg  every 2 hrs. She was noted to have pain and swelling on the left shoulder area. The area was painful and was complaining of pain on abduction of the left shoulder joint. On  she had similar swelling on the right side. She reports of having spinal abscess over the cervical abscess and was treated with iv antibiotic in [place]. She is not sure which hospital. I was not able to find the report on care everywhere. Her crp is 0.3. ESR is pending    PAST MEDICAL  HISTORY   Pancreatites  H.pylori   Chronic pain  Gall stones  Anxiety  Pituatry tumor  dysaaautonomia  PAST SURGICAL HISTORY     Past Surgical History:   Procedure Laterality Date    UPPER GASTROINTESTINAL ENDOSCOPY  2024    ESOPHAGOGASTRODUODENOSCOPY BIOPSY performed by Endy Porter MD at [facility] ENDOSCOPY    UPPER GASTROINTESTINAL ENDOSCOPY N/A 2024    Celiac Block Injection performed by Marika Vergara MD at [facility] ENDOSCOPY    UPPER  GASTROINTESTINAL ENDOSCOPY Left 9/12/2024    ENDOSCOPIC ULTRASOUND performed by Marika Vergara MD at [facility] ENDOSCOPY     Cholecystectomy  Upper endoscopy    MEDICATIONS:       Scheduled Meds:   [START ON 9/23/2024] cefTRIAXone (ROCEPHIN) IV  2,000 mg IntraVENous Q24H    lipase-protease-amylase  5,000 Units Oral TID WC    lipase-protease-amylase  20,000 Units Oral TID WC    sodium chloride flush  5-40 mL IntraVENous 2 times per day    [Held by provider] enoxaparin  40 mg SubCUTAneous Daily    pantoprazole  40 mg IntraVENous BID    clarithromycin  500 mg Oral 2 times per day    metroNIDAZOLE  500 mg Oral 3 times per day     Continuous Infusions:   sodium chloride       PRN Meds:LORazepam, diphenhydrAMINE, morphine **OR** morphine, sodium chloride flush, sodium chloride, potassium chloride **OR** potassium alternative oral replacement **OR** potassium chloride, magnesium sulfate, ondansetron **OR** ondansetron, polyethylene glycol  Allergies:   ALLERGIES:    Keflex [cephalexin], Adhesive tape, Fentanyl, Haldol [haloperidol], Ketamine, Percocet [oxycodone-acetaminophen], Reglan [metoclopramide], Toradol [ketorolac tromethamine], Tramadol, Vancomycin, and Nsaids        SOCIAL HISTORY:     TOBACCO:   reports that she has never smoked. She has never used smokeless tobacco.     ETOH:   reports that she does not currently use alcohol.  Patient currently lives with family        FAMILY HISTORY:     No family history on file.    REVIEW OF SYSTEMS:     Constitutional: no fever, no night sweats, + fatigue, no weight loss.  Head: no head ache , no head injury, no migranes.  Eye: no eye discharge, blurring of vision, no double vision,no eye pain.  Ears: no hearing difficulty, no tinnitus  Mouth/throat: no ulceration, dental caries , dysphagia, no hoarseness and voice change  Respiratory: no cough no chest pain,no shortness of breath,no wheezing  CVS: no palpitation, no chest pain,   GI: as noted in HPI  LAURI: no dysuria,

## 2024-09-22 NOTE — PLAN OF CARE
Problem: Discharge Planning  Goal: Discharge to home or other facility with appropriate resources  9/22/2024 1523 by Daly Leal, RN  Outcome: Progressing  Flowsheets (Taken 9/22/2024 1523)  Discharge to home or other facility with appropriate resources: Identify barriers to discharge with patient and caregiver  Problem: Safety - Adult  Goal: Free from fall injury  9/22/2024 1523 by Daly Leal, RN  Outcome: Progressing  Flowsheets (Taken 9/22/2024 1523)  Free From Fall Injury: Instruct family/caregiver on patient safety    Problem: Pain  Goal: Verbalizes/displays adequate comfort level or baseline comfort level  9/22/2024 1523 by Daly Leal RN  Outcome: Progressing  Flowsheets (Taken 9/22/2024 1523)  Verbalizes/displays adequate comfort level or baseline comfort level: Encourage patient to monitor pain and request assistance

## 2024-09-22 NOTE — PROGRESS NOTES
associated with swelling, erythema and warmth. She endorsed numbness in her arm.  Patient reportedly has a history of prior spinal abscesses, but no information found in chart.  CT scan showed edema and subcutaneous fat of the neck and upper shoulders, more prominent in the left than right consistent with diffuse cellulitis but no fluid collection or abscess.  The patient already had been on clarithromycin since 9/18.  Antibiotics were escalated to Rocephin.  This morning the patient notes left shoulder and neck pain. Denies any abdominal complaints. ID consulted. Will get MRI.     ROS (12 point review of systems completed.  Pertinent positives noted. Otherwise ROS is negative).    Medications:  Reviewed    Infusion Medications    sodium chloride       Scheduled Medications    cefTRIAXone (ROCEPHIN) IV  1,000 mg IntraVENous Q24H    lipase-protease-amylase  5,000 Units Oral TID WC    lipase-protease-amylase  20,000 Units Oral TID WC    sodium chloride flush  5-40 mL IntraVENous 2 times per day    [Held by provider] enoxaparin  40 mg SubCUTAneous Daily    pantoprazole  40 mg IntraVENous BID    clarithromycin  500 mg Oral 2 times per day    metroNIDAZOLE  500 mg Oral 3 times per day     PRN Meds: morphine **OR** morphine, sodium chloride flush, sodium chloride, potassium chloride **OR** potassium alternative oral replacement **OR** potassium chloride, magnesium sulfate, ondansetron **OR** ondansetron, polyethylene glycol, diphenhydrAMINE      Intake/Output Summary (Last 24 hours) at 9/22/2024 0839  Last data filed at 9/21/2024 1604  Gross per 24 hour   Intake 240 ml   Output --   Net 240 ml         Diet:  ADULT DIET; Regular; Low Fat (less than or equal to 50 gm/day); Low Fiber    Exam:  BP (!) 88/54   Pulse 88   Temp 98.2 °F (36.8 °C) (Oral)   Resp 16   Ht 1.702 m (5' 7\")   Wt 63.5 kg (140 lb)   LMP 09/04/2024 (Within Days)   SpO2 98%   BMI 21.93 kg/m²     General appearance: No apparent distress, appears  osseous erosions to suggest findings of    osteomyelitis.      2. No acute cardiopulmonary disease.      This document has been electronically signed by: Jo Barraza MD on    09/21/2024 11:58 PM      All CTs at this facility use dose modulation techniques and iterative    reconstructions, and/or weight-based dosing   when appropriate to reduce radiation to a low as reasonably achievable.          DVT prophylaxis: [] Lovenox                                 [x] SCDs                                 [] SQ Heparin                                 [] Encourage ambulation           [] Already on Anticoagulation     Code Status: Full Code    PT/OT Eval Status: N/A    Tele:   [x] yes             [] no    Electronically signed by Remy Arias DO on 9/22/2024 at 8:39 AM

## 2024-09-22 NOTE — PLAN OF CARE
Problem: Discharge Planning  Goal: Discharge to home or other facility with appropriate resources  Outcome: Not Progressing  Flowsheets (Taken 9/21/2024 2000)  Discharge to home or other facility with appropriate resources: Identify barriers to discharge with patient and caregiver     Problem: Pain  Goal: Verbalizes/displays adequate comfort level or baseline comfort level  Outcome: Not Progressing     Problem: Safety - Adult  Goal: Free from fall injury  Outcome: Not Progressing     Problem: Discharge Planning  Goal: Discharge to home or other facility with appropriate resources  Outcome: Not Progressing  Flowsheets (Taken 9/21/2024 2000)  Discharge to home or other facility with appropriate resources: Identify barriers to discharge with patient and caregiver     Problem: Pain  Goal: Verbalizes/displays adequate comfort level or baseline comfort level  Outcome: Not Progressing     Problem: Safety - Adult  Goal: Free from fall injury  Outcome: Not Progressing  Care plan reviewed with patient.  Patient verbalize understanding of the plan of care and contribute to goal setting.

## 2024-09-23 ENCOUNTER — APPOINTMENT (OUTPATIENT)
Dept: MRI IMAGING | Age: 34
DRG: 378 | End: 2024-09-23
Payer: COMMERCIAL

## 2024-09-23 LAB
ANION GAP SERPL CALC-SCNC: 12 MEQ/L (ref 8–16)
BASOPHILS ABSOLUTE: 0 THOU/MM3 (ref 0–0.1)
BASOPHILS NFR BLD AUTO: 0.5 %
BUN SERPL-MCNC: 9 MG/DL (ref 7–22)
CALCIUM SERPL-MCNC: 8.8 MG/DL (ref 8.5–10.5)
CHLORIDE SERPL-SCNC: 106 MEQ/L (ref 98–111)
CO2 SERPL-SCNC: 23 MEQ/L (ref 23–33)
CREAT SERPL-MCNC: 0.7 MG/DL (ref 0.4–1.2)
DEPRECATED RDW RBC AUTO: 46.5 FL (ref 35–45)
EOSINOPHIL NFR BLD AUTO: 2.8 %
EOSINOPHILS ABSOLUTE: 0.1 THOU/MM3 (ref 0–0.4)
ERYTHROCYTE [DISTWIDTH] IN BLOOD BY AUTOMATED COUNT: 14.5 % (ref 11.5–14.5)
GFR SERPL CREATININE-BSD FRML MDRD: > 90 ML/MIN/1.73M2
GLUCOSE SERPL-MCNC: 75 MG/DL (ref 70–108)
HCT VFR BLD AUTO: 38.6 % (ref 37–47)
HGB BLD-MCNC: 12 GM/DL (ref 12–16)
IMM GRANULOCYTES # BLD AUTO: 0.01 THOU/MM3 (ref 0–0.07)
IMM GRANULOCYTES NFR BLD AUTO: 0.2 %
LYMPHOCYTES ABSOLUTE: 1.4 THOU/MM3 (ref 1–4.8)
LYMPHOCYTES NFR BLD AUTO: 33 %
MCH RBC QN AUTO: 27.1 PG (ref 26–33)
MCHC RBC AUTO-ENTMCNC: 31.1 GM/DL (ref 32.2–35.5)
MCV RBC AUTO: 87.3 FL (ref 81–99)
MONOCYTES ABSOLUTE: 0.4 THOU/MM3 (ref 0.4–1.3)
MONOCYTES NFR BLD AUTO: 9.8 %
MRSA DNA SPEC QL NAA+PROBE: NEGATIVE
NEUTROPHILS ABSOLUTE: 2.3 THOU/MM3 (ref 1.8–7.7)
NEUTROPHILS NFR BLD AUTO: 53.7 %
NRBC BLD AUTO-RTO: 0 /100 WBC
PLATELET # BLD AUTO: 181 THOU/MM3 (ref 130–400)
PMV BLD AUTO: 12.2 FL (ref 9.4–12.4)
POTASSIUM SERPL-SCNC: 3.8 MEQ/L (ref 3.5–5.2)
RBC # BLD AUTO: 4.42 MILL/MM3 (ref 4.2–5.4)
SODIUM SERPL-SCNC: 141 MEQ/L (ref 135–145)
WBC # BLD AUTO: 4.3 THOU/MM3 (ref 4.8–10.8)

## 2024-09-23 PROCEDURE — 6360000002 HC RX W HCPCS

## 2024-09-23 PROCEDURE — 6360000002 HC RX W HCPCS: Performed by: STUDENT IN AN ORGANIZED HEALTH CARE EDUCATION/TRAINING PROGRAM

## 2024-09-23 PROCEDURE — 2580000003 HC RX 258

## 2024-09-23 PROCEDURE — 6370000000 HC RX 637 (ALT 250 FOR IP)

## 2024-09-23 PROCEDURE — 2580000003 HC RX 258: Performed by: INTERNAL MEDICINE

## 2024-09-23 PROCEDURE — 80048 BASIC METABOLIC PNL TOTAL CA: CPT

## 2024-09-23 PROCEDURE — 6360000002 HC RX W HCPCS: Performed by: INTERNAL MEDICINE

## 2024-09-23 PROCEDURE — 96376 TX/PRO/DX INJ SAME DRUG ADON: CPT

## 2024-09-23 PROCEDURE — 6370000000 HC RX 637 (ALT 250 FOR IP): Performed by: STUDENT IN AN ORGANIZED HEALTH CARE EDUCATION/TRAINING PROGRAM

## 2024-09-23 PROCEDURE — 36415 COLL VENOUS BLD VENIPUNCTURE: CPT

## 2024-09-23 PROCEDURE — 85025 COMPLETE CBC W/AUTO DIFF WBC: CPT

## 2024-09-23 PROCEDURE — 1200000000 HC SEMI PRIVATE

## 2024-09-23 PROCEDURE — 99233 SBSQ HOSP IP/OBS HIGH 50: CPT

## 2024-09-23 PROCEDURE — G0378 HOSPITAL OBSERVATION PER HR: HCPCS

## 2024-09-23 PROCEDURE — 96366 THER/PROPH/DIAG IV INF ADDON: CPT

## 2024-09-23 RX ORDER — MORPHINE SULFATE 2 MG/ML
2 INJECTION, SOLUTION INTRAMUSCULAR; INTRAVENOUS EVERY 4 HOURS PRN
Status: DISCONTINUED | OUTPATIENT
Start: 2024-09-23 | End: 2024-09-23

## 2024-09-23 RX ORDER — MORPHINE SULFATE 2 MG/ML
1 INJECTION, SOLUTION INTRAMUSCULAR; INTRAVENOUS EVERY 4 HOURS PRN
Status: DISCONTINUED | OUTPATIENT
Start: 2024-09-23 | End: 2024-09-24

## 2024-09-23 RX ORDER — DIPHENHYDRAMINE HYDROCHLORIDE 50 MG/ML
50 INJECTION INTRAMUSCULAR; INTRAVENOUS EVERY 6 HOURS PRN
Status: DISCONTINUED | OUTPATIENT
Start: 2024-09-23 | End: 2024-09-27 | Stop reason: HOSPADM

## 2024-09-23 RX ADMIN — DIPHENHYDRAMINE HYDROCHLORIDE 50 MG: 50 INJECTION INTRAMUSCULAR; INTRAVENOUS at 00:11

## 2024-09-23 RX ADMIN — LORAZEPAM 1 MG: 2 INJECTION INTRAMUSCULAR; INTRAVENOUS at 19:39

## 2024-09-23 RX ADMIN — OXYCODONE 10 MG: 5 TABLET ORAL at 19:46

## 2024-09-23 RX ADMIN — MORPHINE SULFATE 4 MG: 4 INJECTION, SOLUTION INTRAMUSCULAR; INTRAVENOUS at 03:59

## 2024-09-23 RX ADMIN — METRONIDAZOLE 500 MG: 500 TABLET ORAL at 18:20

## 2024-09-23 RX ADMIN — CLARITHROMYCIN 500 MG: 500 TABLET ORAL at 12:14

## 2024-09-23 RX ADMIN — MORPHINE SULFATE 1 MG: 2 INJECTION, SOLUTION INTRAMUSCULAR; INTRAVENOUS at 13:17

## 2024-09-23 RX ADMIN — DIPHENHYDRAMINE HYDROCHLORIDE 50 MG: 50 INJECTION INTRAMUSCULAR; INTRAVENOUS at 13:17

## 2024-09-23 RX ADMIN — PANTOPRAZOLE SODIUM 40 MG: 40 INJECTION, POWDER, FOR SOLUTION INTRAVENOUS at 19:45

## 2024-09-23 RX ADMIN — DIPHENHYDRAMINE HYDROCHLORIDE 50 MG: 50 INJECTION INTRAMUSCULAR; INTRAVENOUS at 18:59

## 2024-09-23 RX ADMIN — PANCRELIPASE LIPASE, PANCRELIPASE PROTEASE, PANCRELIPASE AMYLASE 20000 UNITS: 20000; 63000; 84000 CAPSULE, DELAYED RELEASE ORAL at 18:20

## 2024-09-23 RX ADMIN — PANCRELIPASE LIPASE, PANCRELIPASE PROTEASE, PANCRELIPASE AMYLASE 5000 UNITS: 5000; 17000; 24000 CAPSULE, DELAYED RELEASE ORAL at 09:08

## 2024-09-23 RX ADMIN — PANTOPRAZOLE SODIUM 40 MG: 40 INJECTION, POWDER, FOR SOLUTION INTRAVENOUS at 09:08

## 2024-09-23 RX ADMIN — CLARITHROMYCIN 500 MG: 500 TABLET ORAL at 00:12

## 2024-09-23 RX ADMIN — PANCRELIPASE LIPASE, PANCRELIPASE PROTEASE, PANCRELIPASE AMYLASE 5000 UNITS: 5000; 17000; 24000 CAPSULE, DELAYED RELEASE ORAL at 12:16

## 2024-09-23 RX ADMIN — MORPHINE SULFATE 4 MG: 4 INJECTION, SOLUTION INTRAMUSCULAR; INTRAVENOUS at 09:08

## 2024-09-23 RX ADMIN — METRONIDAZOLE 500 MG: 500 TABLET ORAL at 00:11

## 2024-09-23 RX ADMIN — SODIUM CHLORIDE, PRESERVATIVE FREE 10 ML: 5 INJECTION INTRAVENOUS at 19:45

## 2024-09-23 RX ADMIN — PANCRELIPASE LIPASE, PANCRELIPASE PROTEASE, PANCRELIPASE AMYLASE 20000 UNITS: 20000; 63000; 84000 CAPSULE, DELAYED RELEASE ORAL at 12:13

## 2024-09-23 RX ADMIN — CEFTRIAXONE SODIUM 2000 MG: 2 INJECTION, POWDER, FOR SOLUTION INTRAMUSCULAR; INTRAVENOUS at 00:20

## 2024-09-23 RX ADMIN — PANCRELIPASE LIPASE, PANCRELIPASE PROTEASE, PANCRELIPASE AMYLASE 20000 UNITS: 20000; 63000; 84000 CAPSULE, DELAYED RELEASE ORAL at 09:08

## 2024-09-23 RX ADMIN — SODIUM CHLORIDE, PRESERVATIVE FREE 10 ML: 5 INJECTION INTRAVENOUS at 09:09

## 2024-09-23 RX ADMIN — METRONIDAZOLE 500 MG: 500 TABLET ORAL at 09:08

## 2024-09-23 RX ADMIN — MORPHINE SULFATE 2 MG: 2 INJECTION, SOLUTION INTRAMUSCULAR; INTRAVENOUS at 00:11

## 2024-09-23 RX ADMIN — DIPHENHYDRAMINE HYDROCHLORIDE 50 MG: 50 INJECTION INTRAMUSCULAR; INTRAVENOUS at 09:08

## 2024-09-23 RX ADMIN — PANCRELIPASE LIPASE, PANCRELIPASE PROTEASE, PANCRELIPASE AMYLASE 5000 UNITS: 5000; 17000; 24000 CAPSULE, DELAYED RELEASE ORAL at 18:20

## 2024-09-23 RX ADMIN — DIPHENHYDRAMINE HYDROCHLORIDE 50 MG: 50 INJECTION INTRAMUSCULAR; INTRAVENOUS at 03:59

## 2024-09-23 ASSESSMENT — PAIN SCALES - GENERAL
PAINLEVEL_OUTOF10: 9
PAINLEVEL_OUTOF10: 9
PAINLEVEL_OUTOF10: 8
PAINLEVEL_OUTOF10: 3
PAINLEVEL_OUTOF10: 9
PAINLEVEL_OUTOF10: 9
PAINLEVEL_OUTOF10: 8

## 2024-09-23 ASSESSMENT — PAIN DESCRIPTION - ORIENTATION
ORIENTATION: LEFT
ORIENTATION: MID;RIGHT;LEFT

## 2024-09-23 ASSESSMENT — PAIN DESCRIPTION - ONSET: ONSET: ON-GOING

## 2024-09-23 ASSESSMENT — PAIN - FUNCTIONAL ASSESSMENT: PAIN_FUNCTIONAL_ASSESSMENT: PREVENTS OR INTERFERES WITH MANY ACTIVE NOT PASSIVE ACTIVITIES

## 2024-09-23 ASSESSMENT — PAIN DESCRIPTION - LOCATION
LOCATION: GENERALIZED
LOCATION: NECK
LOCATION: SHOULDER

## 2024-09-23 ASSESSMENT — PAIN DESCRIPTION - DESCRIPTORS
DESCRIPTORS: ACHING
DESCRIPTORS: SHARP

## 2024-09-23 ASSESSMENT — PAIN DESCRIPTION - FREQUENCY: FREQUENCY: CONTINUOUS

## 2024-09-23 ASSESSMENT — PAIN SCALES - WONG BAKER: WONGBAKER_NUMERICALRESPONSE: HURTS WHOLE LOT

## 2024-09-23 ASSESSMENT — PAIN DESCRIPTION - PAIN TYPE: TYPE: ACUTE PAIN

## 2024-09-23 NOTE — PLAN OF CARE
Problem: Discharge Planning  Goal: Discharge to home or other facility with appropriate resources  9/22/2024 2208 by Jose Hummel RN  Outcome: Progressing  Flowsheets (Taken 9/22/2024 2000)  Discharge to home or other facility with appropriate resources: Identify barriers to discharge with patient and caregiver  9/22/2024 1523 by Daly Leal RN  Outcome: Progressing  Flowsheets (Taken 9/22/2024 1523)  Discharge to home or other facility with appropriate resources: Identify barriers to discharge with patient and caregiver     Problem: Pain  Goal: Verbalizes/displays adequate comfort level or baseline comfort level  9/22/2024 2208 by Jose Hummel RN  Outcome: Progressing  9/22/2024 1523 by Daly Leal RN  Outcome: Progressing  Flowsheets (Taken 9/22/2024 1523)  Verbalizes/displays adequate comfort level or baseline comfort level: Encourage patient to monitor pain and request assistance     Problem: Safety - Adult  Goal: Free from fall injury  9/22/2024 2208 by Jose Hummel RN  Outcome: Progressing  9/22/2024 1523 by Daly Leal RN  Outcome: Progressing  Flowsheets (Taken 9/22/2024 1523)  Free From Fall Injury: Instruct family/caregiver on patient safety  Care plan reviewed with patient.  Patient verbalize understanding of the plan of care and contribute to goal setting.

## 2024-09-23 NOTE — PROGRESS NOTES
Hospitalist Progress Note      Patient:  Dixon Conn 33 y.o. female       : 1990  Unit/Bed:8A-20/020-A    Date of Admission: 2024      ASSESSMENT AND PLAN    Active Problems  H. pylori gastritis: Patient previously hospitalized  through  for acute pancreatitis flare.  Had coffee-ground emesis with bright red streaks at that time.  GI performed EGD on  that showed prominent streaking erythema in stomach.  Biopsies obtained. Patient reached out to GI due to increased pain who told her her H. pylori antigen is positive.  Advised to return to the ED .  Initiated on triple therapy: Flagyl 500 mg 3 times daily, clindamycin 500 mg daily and pantoprazole 40 mg twice daily for 14 days.  On regular low-fat, low fiber diet  Continue to wean pain medication as able  Autoimmune pancreatitis, chronic: Continue Creon  Being evaluated outpatient, history of pancreatitis flares   patient underwent ultrasound guided celiac plexus block  Left shoulder pain: CT scan showed edema and subcu fat of the neck and upper shoulders, more prominent left arm than right consistent with diffuse cellulitis but no fluid collection or abscess.  Patient had been on clarithromycin since , antibiotics escalated to Rocephin  ID consulted-discontinue Rocephin, suspect myositis.  MRI cervical spine to rule out infection pending  Leukopenia: Suspect reactive, continue to monitor  Concern for Drug Seeking Behavior:   Patient advised for multiple days to attempt to decrease IV use of pain medication, however has not.  Patient very upset when informed plan to transition to oral she is tolerating oral foods stating oral medications do not work for her.  Patient also not wanting Benadryl 50 mg IV every 4 hours to be discontinued and states this is the only thing that works for her nausea.  Per chart review, patient has been to many ERs and had repeat hospitalizations for chronic abdominal pain.  Note of drug-seeking  Scheduled Medications    cefTRIAXone (ROCEPHIN) IV  2,000 mg IntraVENous Q24H    lipase-protease-amylase  5,000 Units Oral TID WC    lipase-protease-amylase  20,000 Units Oral TID WC    sodium chloride flush  5-40 mL IntraVENous 2 times per day    [Held by provider] enoxaparin  40 mg SubCUTAneous Daily    pantoprazole  40 mg IntraVENous BID    clarithromycin  500 mg Oral 2 times per day    metroNIDAZOLE  500 mg Oral 3 times per day    PRN Meds: LORazepam, diphenhydrAMINE, morphine **OR** morphine, oxyCODONE **OR** oxyCODONE, sodium chloride flush, sodium chloride, potassium chloride **OR** potassium alternative oral replacement **OR** potassium chloride, magnesium sulfate, ondansetron **OR** ondansetron, polyethylene glycol    Exam:  /60   Pulse 85   Temp 97.3 °F (36.3 °C) (Oral)   Resp 19   Ht 1.702 m (5' 7\")   Wt 63.5 kg (140 lb)   LMP 09/04/2024 (Within Days)   SpO2 93%   BMI 21.93 kg/m²   General: Upset, no distress, appears stated age.   Eyes:  PERRL. Conjunctivae/corneas clear.  HENT: Head normal appearing. Nares normal. Oral mucosa moist.  Hearing intact.   Neck: Supple, with full range of motion. Trachea midline.    Respiratory:  Normal effort. Clear to auscultation, without rales or wheezes or rhonchi.  Cardiovascular: Normal rate, regular rhythm with normal S1/S2 without murmurs.    No lower extremity edema.   Abdomen: Soft, diffusely tender, non-distended with normal bowel sounds.  Musculoskeletal: Left shoulder tenderness, decreased ROM due to pain.  Normal tone. No abnormal movements.   Skin: Warm and dry. No rashes or lesions.  Neurologic:  No focal sensory/motor deficits in the upper or lower extremities. Cranial nerves:  grossly non-focal 2-12.     Psychiatric: Alert and oriented, normal insight and thought content.     Labs/Radiology: See chart or assessment above.     Electronically signed by Linda Huerta PA-C on 9/23/2024 at 9:38 AM

## 2024-09-23 NOTE — CARE COORDINATION
9/23/24, 7:36 AM EDT    DISCHARGE ON GOING EVALUATION    [facility] day: 0  Location: 8A-20/020-A Reason for admit: Hematemesis [K92.0]  Helicobacter pylori gastritis [K29.70, B96.81]  Hematemesis with nausea [K92.0]  Nausea and vomiting, unspecified vomiting type [R11.2]     Procedures: none    Imaging since last note: 9/21 CT Chest WO: 1. Edema in the subcutaneous fat of the neck and upper shoulders more   prominent on the left than the right and consistent with a diffuse   cellulitis. No soft tissue fluid collection to suggest soft tissue   abscess. No soft tissue air. No osseous erosions to suggest findings of osteomyelitis.   2. No acute cardiopulmonary disease.     MRI L Shoulder W WO: Ordered    Barriers to Discharge:   Consult to ID for swelling at L shoulder.   IV Rocephin, PO Biaxin, PO Flagyl.   MRI ordered    PCP: Schoen, Ashley, PA  No data recorded  Patient Goals/Plan/Treatment Preferences: Planning to return home with her . Denies needs.

## 2024-09-23 NOTE — PLAN OF CARE
Problem: Pain  Goal: Verbalizes/displays adequate comfort level or baseline comfort level  9/23/2024 1208 by Lucius Guevara, RN  Outcome: Progressing  Flowsheets (Taken 9/23/2024 0904 by Mary Farmer, RN)  Verbalizes/displays adequate comfort level or baseline comfort level:   Encourage patient to monitor pain and request assistance   Assess pain using appropriate pain scale   Administer analgesics based on type and severity of pain and evaluate response   Implement non-pharmacological measures as appropriate and evaluate response   Consider cultural and social influences on pain and pain management   Notify Licensed Independent Practitioner if interventions unsuccessful or patient reports new pain  9/22/2024 2208 by Jose Hummel, RN  Outcome: Progressing     Problem: Safety - Adult  Goal: Free from fall injury  9/23/2024 1208 by Lucius Guevara, RN  Outcome: Progressing  9/22/2024 2208 by Jose Hummel, RN  Outcome: Progressing

## 2024-09-23 NOTE — PROGRESS NOTES
Progress note: Infectious diseases    Patient - Dixon Conn,  Age - 33 y.o.    - 1990      Room Number - 8A-20/020-A   MRN -  [id number]   [facility] # - [id number]  Date of Admission -  2024  4:26 PM    SUBJECTIVE:   Events noted. Crp and ESR normal  Has elevated cpk  Medications reviewed. She is on large doses of narcotics   OBJECTIVE   VITALS    height is 1.702 m (5' 7\") and weight is 63.5 kg (140 lb). Her oral temperature is 98.1 °F (36.7 °C). Her blood pressure is 109/67 and her pulse is 84. Her respiration is 18 and oxygen saturation is 100%.       Wt Readings from Last 3 Encounters:   24 63.5 kg (140 lb)   09/10/24 63.5 kg (140 lb)       I/O (24 Hours)  No intake or output data in the 24 hours ending 24 1315    General Appearance  Awake, alert, oriented,  not  In acute distress  HEENT - normocephalic, atraumatic, pink conjunctiva,  anicteric sclera  Neck - Supple, no mass  Lungs -  Bilateral good air entry, no rhonchi, no wheeze  Cardiovascular - Heart sounds are normal.     Abdomen - soft, not distended, nontender,   Neurologic -orietned  Skin - No bruising or bleeding  Extremities - has swollen left trapezius muscle    MEDICATIONS:      cefTRIAXone (ROCEPHIN) IV  2,000 mg IntraVENous Q24H    lipase-protease-amylase  5,000 Units Oral TID WC    lipase-protease-amylase  20,000 Units Oral TID WC    sodium chloride flush  5-40 mL IntraVENous 2 times per day    [Held by provider] enoxaparin  40 mg SubCUTAneous Daily    pantoprazole  40 mg IntraVENous BID    clarithromycin  500 mg Oral 2 times per day    metroNIDAZOLE  500 mg Oral 3 times per day      sodium chloride Stopped (24 0916)     morphine **OR** [DISCONTINUED] morphine, LORazepam, diphenhydrAMINE, oxyCODONE **OR** oxyCODONE, sodium chloride flush, sodium chloride, potassium chloride **OR** potassium alternative oral replacement **OR**

## 2024-09-24 ENCOUNTER — APPOINTMENT (OUTPATIENT)
Dept: MRI IMAGING | Age: 34
DRG: 378 | End: 2024-09-24
Payer: COMMERCIAL

## 2024-09-24 LAB
ANION GAP SERPL CALC-SCNC: 12 MEQ/L (ref 8–16)
BUN SERPL-MCNC: 11 MG/DL (ref 7–22)
CALCIUM SERPL-MCNC: 8.9 MG/DL (ref 8.5–10.5)
CHLORIDE SERPL-SCNC: 103 MEQ/L (ref 98–111)
CK SERPL-CCNC: 754 U/L (ref 30–135)
CO2 SERPL-SCNC: 21 MEQ/L (ref 23–33)
CREAT SERPL-MCNC: 0.7 MG/DL (ref 0.4–1.2)
DEPRECATED RDW RBC AUTO: 46.4 FL (ref 35–45)
ERYTHROCYTE [DISTWIDTH] IN BLOOD BY AUTOMATED COUNT: 14.7 % (ref 11.5–14.5)
GFR SERPL CREATININE-BSD FRML MDRD: > 90 ML/MIN/1.73M2
GLUCOSE SERPL-MCNC: 95 MG/DL (ref 70–108)
HCT VFR BLD AUTO: 39.1 % (ref 37–47)
HGB BLD-MCNC: 12.4 GM/DL (ref 12–16)
MCH RBC QN AUTO: 27.5 PG (ref 26–33)
MCHC RBC AUTO-ENTMCNC: 31.7 GM/DL (ref 32.2–35.5)
MCV RBC AUTO: 86.7 FL (ref 81–99)
PLATELET # BLD AUTO: 182 THOU/MM3 (ref 130–400)
PMV BLD AUTO: 12.2 FL (ref 9.4–12.4)
POTASSIUM SERPL-SCNC: 4 MEQ/L (ref 3.5–5.2)
RBC # BLD AUTO: 4.51 MILL/MM3 (ref 4.2–5.4)
SODIUM SERPL-SCNC: 136 MEQ/L (ref 135–145)
T4 FREE SERPL-MCNC: 1.28 NG/DL (ref 0.93–1.68)
WBC # BLD AUTO: 3.3 THOU/MM3 (ref 4.8–10.8)

## 2024-09-24 PROCEDURE — 6360000002 HC RX W HCPCS

## 2024-09-24 PROCEDURE — 84439 ASSAY OF FREE THYROXINE: CPT

## 2024-09-24 PROCEDURE — 99232 SBSQ HOSP IP/OBS MODERATE 35: CPT | Performed by: PHYSICIAN ASSISTANT

## 2024-09-24 PROCEDURE — 85027 COMPLETE CBC AUTOMATED: CPT

## 2024-09-24 PROCEDURE — 6370000000 HC RX 637 (ALT 250 FOR IP): Performed by: STUDENT IN AN ORGANIZED HEALTH CARE EDUCATION/TRAINING PROGRAM

## 2024-09-24 PROCEDURE — 6370000000 HC RX 637 (ALT 250 FOR IP)

## 2024-09-24 PROCEDURE — 73223 MRI JOINT UPR EXTR W/O&W/DYE: CPT

## 2024-09-24 PROCEDURE — 2580000003 HC RX 258: Performed by: PHYSICIAN ASSISTANT

## 2024-09-24 PROCEDURE — 1200000000 HC SEMI PRIVATE

## 2024-09-24 PROCEDURE — 80048 BASIC METABOLIC PNL TOTAL CA: CPT

## 2024-09-24 PROCEDURE — 6360000004 HC RX CONTRAST MEDICATION: Performed by: INTERNAL MEDICINE

## 2024-09-24 PROCEDURE — 82550 ASSAY OF CK (CPK): CPT

## 2024-09-24 PROCEDURE — 2580000003 HC RX 258

## 2024-09-24 PROCEDURE — 86038 ANTINUCLEAR ANTIBODIES: CPT

## 2024-09-24 PROCEDURE — A9579 GAD-BASE MR CONTRAST NOS,1ML: HCPCS | Performed by: INTERNAL MEDICINE

## 2024-09-24 PROCEDURE — 36415 COLL VENOUS BLD VENIPUNCTURE: CPT

## 2024-09-24 PROCEDURE — 6360000002 HC RX W HCPCS: Performed by: PHYSICIAN ASSISTANT

## 2024-09-24 PROCEDURE — 72156 MRI NECK SPINE W/O & W/DYE: CPT

## 2024-09-24 RX ORDER — SODIUM CHLORIDE, SODIUM LACTATE, POTASSIUM CHLORIDE, AND CALCIUM CHLORIDE .6; .31; .03; .02 G/100ML; G/100ML; G/100ML; G/100ML
500 INJECTION, SOLUTION INTRAVENOUS ONCE
Status: COMPLETED | OUTPATIENT
Start: 2024-09-24 | End: 2024-09-24

## 2024-09-24 RX ORDER — CLARITHROMYCIN 500 MG
500 TABLET ORAL EVERY 12 HOURS SCHEDULED
Qty: 3 TABLET | Refills: 0 | Status: SHIPPED | OUTPATIENT
Start: 2024-09-24 | End: 2024-09-27 | Stop reason: HOSPADM

## 2024-09-24 RX ORDER — PANTOPRAZOLE SODIUM 40 MG/1
40 TABLET, DELAYED RELEASE ORAL 2 TIMES DAILY
Status: DISCONTINUED | OUTPATIENT
Start: 2024-09-24 | End: 2024-09-27 | Stop reason: HOSPADM

## 2024-09-24 RX ORDER — LIDOCAINE 4 G/G
2 PATCH TOPICAL DAILY
Status: DISCONTINUED | OUTPATIENT
Start: 2024-09-24 | End: 2024-09-25

## 2024-09-24 RX ORDER — LIDOCAINE 40 MG/G
CREAM TOPICAL PRN
Status: DISCONTINUED | OUTPATIENT
Start: 2024-09-24 | End: 2024-09-27 | Stop reason: HOSPADM

## 2024-09-24 RX ORDER — MORPHINE SULFATE 4 MG/ML
4 INJECTION, SOLUTION INTRAMUSCULAR; INTRAVENOUS ONCE
Status: COMPLETED | OUTPATIENT
Start: 2024-09-24 | End: 2024-09-25

## 2024-09-24 RX ORDER — LIDOCAINE 4 G/G
2 PATCH TOPICAL DAILY
Status: DISCONTINUED | OUTPATIENT
Start: 2024-09-24 | End: 2024-09-24

## 2024-09-24 RX ORDER — LORAZEPAM 2 MG/ML
1 INJECTION INTRAMUSCULAR ONCE
Status: COMPLETED | OUTPATIENT
Start: 2024-09-24 | End: 2024-09-24

## 2024-09-24 RX ADMIN — PANTOPRAZOLE SODIUM 40 MG: 40 TABLET, DELAYED RELEASE ORAL at 22:02

## 2024-09-24 RX ADMIN — LORAZEPAM 1 MG: 2 INJECTION INTRAMUSCULAR; INTRAVENOUS at 16:19

## 2024-09-24 RX ADMIN — CLARITHROMYCIN 500 MG: 500 TABLET ORAL at 02:46

## 2024-09-24 RX ADMIN — METRONIDAZOLE 500 MG: 500 TABLET ORAL at 02:46

## 2024-09-24 RX ADMIN — SODIUM CHLORIDE, PRESERVATIVE FREE 10 ML: 5 INJECTION INTRAVENOUS at 08:03

## 2024-09-24 RX ADMIN — GADOTERIDOL 15 ML: 279.3 INJECTION, SOLUTION INTRAVENOUS at 17:35

## 2024-09-24 RX ADMIN — MORPHINE SULFATE 1 MG: 2 INJECTION, SOLUTION INTRAMUSCULAR; INTRAVENOUS at 03:46

## 2024-09-24 RX ADMIN — PANCRELIPASE LIPASE, PANCRELIPASE PROTEASE, PANCRELIPASE AMYLASE 5000 UNITS: 5000; 17000; 24000 CAPSULE, DELAYED RELEASE ORAL at 12:02

## 2024-09-24 RX ADMIN — OXYCODONE 10 MG: 5 TABLET ORAL at 02:46

## 2024-09-24 RX ADMIN — PANCRELIPASE LIPASE, PANCRELIPASE PROTEASE, PANCRELIPASE AMYLASE 5000 UNITS: 5000; 17000; 24000 CAPSULE, DELAYED RELEASE ORAL at 18:15

## 2024-09-24 RX ADMIN — CLARITHROMYCIN 500 MG: 500 TABLET ORAL at 12:51

## 2024-09-24 RX ADMIN — OXYCODONE 10 MG: 5 TABLET ORAL at 11:02

## 2024-09-24 RX ADMIN — DIPHENHYDRAMINE HYDROCHLORIDE 50 MG: 50 INJECTION INTRAMUSCULAR; INTRAVENOUS at 02:46

## 2024-09-24 RX ADMIN — SODIUM CHLORIDE, POTASSIUM CHLORIDE, SODIUM LACTATE AND CALCIUM CHLORIDE 500 ML: 600; 310; 30; 20 INJECTION, SOLUTION INTRAVENOUS at 15:39

## 2024-09-24 RX ADMIN — MORPHINE SULFATE 1 MG: 2 INJECTION, SOLUTION INTRAMUSCULAR; INTRAVENOUS at 12:02

## 2024-09-24 RX ADMIN — DIPHENHYDRAMINE HYDROCHLORIDE 50 MG: 50 INJECTION INTRAMUSCULAR; INTRAVENOUS at 14:48

## 2024-09-24 RX ADMIN — OXYCODONE 10 MG: 5 TABLET ORAL at 17:38

## 2024-09-24 RX ADMIN — OXYCODONE 10 MG: 5 TABLET ORAL at 22:00

## 2024-09-24 RX ADMIN — METRONIDAZOLE 500 MG: 500 TABLET ORAL at 09:49

## 2024-09-24 RX ADMIN — PANCRELIPASE LIPASE, PANCRELIPASE PROTEASE, PANCRELIPASE AMYLASE 20000 UNITS: 20000; 63000; 84000 CAPSULE, DELAYED RELEASE ORAL at 08:01

## 2024-09-24 RX ADMIN — DIPHENHYDRAMINE HYDROCHLORIDE 50 MG: 50 INJECTION INTRAMUSCULAR; INTRAVENOUS at 08:49

## 2024-09-24 RX ADMIN — PANCRELIPASE LIPASE, PANCRELIPASE PROTEASE, PANCRELIPASE AMYLASE 20000 UNITS: 20000; 63000; 84000 CAPSULE, DELAYED RELEASE ORAL at 12:51

## 2024-09-24 RX ADMIN — OXYCODONE 10 MG: 5 TABLET ORAL at 06:55

## 2024-09-24 RX ADMIN — PANTOPRAZOLE SODIUM 40 MG: 40 INJECTION, POWDER, FOR SOLUTION INTRAVENOUS at 09:49

## 2024-09-24 RX ADMIN — PANCRELIPASE LIPASE, PANCRELIPASE PROTEASE, PANCRELIPASE AMYLASE 5000 UNITS: 5000; 17000; 24000 CAPSULE, DELAYED RELEASE ORAL at 08:02

## 2024-09-24 RX ADMIN — METRONIDAZOLE 500 MG: 500 TABLET ORAL at 18:15

## 2024-09-24 RX ADMIN — MORPHINE SULFATE 1 MG: 2 INJECTION, SOLUTION INTRAMUSCULAR; INTRAVENOUS at 07:57

## 2024-09-24 RX ADMIN — PANCRELIPASE LIPASE, PANCRELIPASE PROTEASE, PANCRELIPASE AMYLASE 20000 UNITS: 20000; 63000; 84000 CAPSULE, DELAYED RELEASE ORAL at 18:15

## 2024-09-24 RX ADMIN — DIPHENHYDRAMINE HYDROCHLORIDE 50 MG: 50 INJECTION INTRAMUSCULAR; INTRAVENOUS at 18:48

## 2024-09-24 ASSESSMENT — PAIN - FUNCTIONAL ASSESSMENT
PAIN_FUNCTIONAL_ASSESSMENT: ACTIVITIES ARE NOT PREVENTED
PAIN_FUNCTIONAL_ASSESSMENT: ACTIVITIES ARE NOT PREVENTED

## 2024-09-24 ASSESSMENT — PAIN SCALES - GENERAL
PAINLEVEL_OUTOF10: 10
PAINLEVEL_OUTOF10: 8
PAINLEVEL_OUTOF10: 10
PAINLEVEL_OUTOF10: 0
PAINLEVEL_OUTOF10: 9
PAINLEVEL_OUTOF10: 5
PAINLEVEL_OUTOF10: 10
PAINLEVEL_OUTOF10: 8
PAINLEVEL_OUTOF10: 10
PAINLEVEL_OUTOF10: 6
PAINLEVEL_OUTOF10: 8
PAINLEVEL_OUTOF10: 10
PAINLEVEL_OUTOF10: 6
PAINLEVEL_OUTOF10: 8
PAINLEVEL_OUTOF10: 10
PAINLEVEL_OUTOF10: 9
PAINLEVEL_OUTOF10: 10

## 2024-09-24 ASSESSMENT — PAIN DESCRIPTION - FREQUENCY
FREQUENCY: CONTINUOUS

## 2024-09-24 ASSESSMENT — PAIN DESCRIPTION - ORIENTATION
ORIENTATION: LEFT
ORIENTATION: LEFT;MID
ORIENTATION: LEFT
ORIENTATION: LEFT

## 2024-09-24 ASSESSMENT — PAIN SCALES - WONG BAKER
WONGBAKER_NUMERICALRESPONSE: HURTS WHOLE LOT

## 2024-09-24 ASSESSMENT — PAIN DESCRIPTION - DIRECTION: RADIATING_TOWARDS: N.A

## 2024-09-24 ASSESSMENT — PAIN DESCRIPTION - PAIN TYPE
TYPE: ACUTE PAIN

## 2024-09-24 ASSESSMENT — PAIN DESCRIPTION - LOCATION
LOCATION: SHOULDER
LOCATION: SHOULDER;ABDOMEN
LOCATION: SHOULDER

## 2024-09-24 ASSESSMENT — PAIN DESCRIPTION - DESCRIPTORS
DESCRIPTORS: ACHING

## 2024-09-24 ASSESSMENT — PAIN DESCRIPTION - ONSET
ONSET: ON-GOING

## 2024-09-24 NOTE — PROGRESS NOTES
Hospitalist Progress Note      Patient:  Dixon Conn 34 y.o. female       : 1990  Unit/Bed:8A-20/020-A    Date of Admission: 2024      ASSESSMENT AND PLAN    Active Problems  H. pylori gastritis: Patient previously hospitalized  through  for acute pancreatitis flare.  Had coffee-ground emesis with bright red streaks at that time.  GI performed EGD on  that showed prominent streaking erythema in stomach.  Biopsies obtained. Patient reached out to GI due to increased pain who told her her H. pylori antigen is positive.  Advised to return to the ED .  Initiated on triple therapy: Flagyl 500 mg 3 times daily, clindamycin 500 mg daily and pantoprazole 40 mg twice daily for 14 days.  On regular low-fat, low fiber diet  Autoimmune pancreatitis, chronic: Continue Creon  Being evaluated outpatient, history of pancreatitis flares   patient underwent ultrasound guided celiac plexus block  Left shoulder pain: CT scan showed edema and subcu fat of the neck and upper shoulders, more prominent left arm than right consistent with diffuse cellulitis but no fluid collection or abscess.  Patient had been on clarithromycin since , antibiotics escalated to Rocephin  ID consulted - MRI cervical spine & shoulder to rule out infection pending  Leukopenia: Suspect reactive, continue to monitor  Concern for Drug Seeking Behavior:   Patient advised for multiple days to attempt to decrease IV use of pain medication, however has not.  Patient very upset when informed plan to transition to oral she is tolerating oral foods stating oral medications do not work for her.  Patient also not wanting Benadryl 50 mg IV every 4 hours to be discontinued and states this is the only thing that works for her nausea.  Per chart review, patient has been to many ERs and had repeat hospitalizations for chronic abdominal pain.  Note of drug-seeking behavior from prior visits in the past.  Multiple documented records of

## 2024-09-24 NOTE — PROGRESS NOTES
[facility] Adult Intravenous to Oral Protocol    Pantoprazole changed to PO per [facility] IV to PO protocol.    Patient meets criteria based on the following:  Tolerating diet more advanced than clear liquids  Tolerating other oral medications  Not on vasopressors  No nausea/vomiting within past 24 hours  No active GI bleed  No gastrectomy, gastric outlet or bowel obstruction, ileus, malabsorption syndrome    Thank you,  Sophia Mills, PharmD  PGY1 Pharmacy Resident  9/24/2024 12:54 PM

## 2024-09-24 NOTE — PROGRESS NOTES
Pt is c/o 10/10 pain in her L shoulder and R forearm not controlled by oxycodone. MRI completed per order. Pt is requesting to speak to a provider other than her attending. Dr. Orosco attempted to see but she was in MRI. This RN reached out to Dr. Fountain, awaiting response.

## 2024-09-24 NOTE — PROGRESS NOTES
Progress note: Infectious diseases    Patient - Dixon Conn,  Age - 34 y.o.    - 1990      Room Number - 8A-20/020-A   MRN -  [id number]   [facility] # - [id number]  Date of Admission -  2024  4:26 PM    SUBJECTIVE:   She complaints of pain on the neck area  Both the trapezius muscles seem to be swollen  OBJECTIVE   VITALS    height is 1.702 m (5' 7\") and weight is 63.5 kg (140 lb). Her oral temperature is 97.2 °F (36.2 °C). Her blood pressure is 114/62 and her pulse is 88. Her respiration is 16 and oxygen saturation is 98%.       Wt Readings from Last 3 Encounters:   24 63.5 kg (140 lb)   09/10/24 63.5 kg (140 lb)       I/O (24 Hours)    Intake/Output Summary (Last 24 hours) at 2024 0955  Last data filed at 2024 1654  Gross per 24 hour   Intake 240 ml   Output --   Net 240 ml       General Appearance  Awake, alert, oriented,  not  In acute distress  HEENT - normocephalic, atraumatic,    She has swelling on both trapezius muscles left worse than right    MEDICATIONS:      lidocaine  2 patch TransDERmal Daily    LORazepam  1 mg IntraVENous Once    lipase-protease-amylase  5,000 Units Oral TID WC    lipase-protease-amylase  20,000 Units Oral TID WC    sodium chloride flush  5-40 mL IntraVENous 2 times per day    [Held by provider] enoxaparin  40 mg SubCUTAneous Daily    pantoprazole  40 mg IntraVENous BID    clarithromycin  500 mg Oral 2 times per day    metroNIDAZOLE  500 mg Oral 3 times per day      sodium chloride Stopped (24 0916)     morphine **OR** [DISCONTINUED] morphine, diphenhydrAMINE, oxyCODONE **OR** oxyCODONE, sodium chloride flush, sodium chloride, potassium chloride **OR** potassium alternative oral replacement **OR** potassium chloride, magnesium sulfate, ondansetron **OR** ondansetron, polyethylene glycol      LABS:     CBC:   Recent Labs     24  1035 24  1042

## 2024-09-24 NOTE — PLAN OF CARE
Problem: Discharge Planning  Goal: Discharge to home or other facility with appropriate resources  9/24/2024 1241 by Jamey Welch RN  Outcome: Progressing  9/24/2024 0316 by Home Weaver RN  Outcome: Progressing  Note: Pt will discharge home when appropriate.       Problem: Pain  Goal: Verbalizes/displays adequate comfort level or baseline comfort level  9/24/2024 1241 by Jamey Welch RN  Outcome: Progressing  9/24/2024 0316 by Home Weaver RN  Outcome: Progressing  Note: Pt denies pain on my shift. Non pharmaceutical interventions like ice and repositioning offered before pain medications. Will continue to assess.       Problem: Safety - Adult  Goal: Free from fall injury  9/24/2024 1241 by Jmaey Welch RN  Outcome: Progressing  9/24/2024 0316 by Home Weaver RN  Outcome: Progressing  Note: Fall precaution in place. Bed alarm/chair alarm. Bed locked in lowest position. Fall band applied if applicable. Call light and overhead table within reach. Will continue to monitor.

## 2024-09-24 NOTE — PLAN OF CARE
Problem: Discharge Planning  Goal: Discharge to home or other facility with appropriate resources  Outcome: Progressing  Note: Pt will discharge home when appropriate.       Problem: Pain  Goal: Verbalizes/displays adequate comfort level or baseline comfort level  Outcome: Progressing  Note: Pt denies pain on my shift. Non pharmaceutical interventions like ice and repositioning offered before pain medications. Will continue to assess.       Problem: Safety - Adult  Goal: Free from fall injury  Outcome: Progressing  Note: Fall precaution in place. Bed alarm/chair alarm. Bed locked in lowest position. Fall band applied if applicable. Call light and overhead table within reach. Will continue to monitor.     Pt verbalizes understanding of care plan. Pt contributes to goal settings.

## 2024-09-25 LAB
ANION GAP SERPL CALC-SCNC: 11 MEQ/L (ref 8–16)
BUN SERPL-MCNC: 10 MG/DL (ref 7–22)
CALCIUM SERPL-MCNC: 8.4 MG/DL (ref 8.5–10.5)
CHLORIDE SERPL-SCNC: 104 MEQ/L (ref 98–111)
CK SERPL-CCNC: 611 U/L (ref 30–135)
CO2 SERPL-SCNC: 22 MEQ/L (ref 23–33)
CREAT SERPL-MCNC: 0.7 MG/DL (ref 0.4–1.2)
DEPRECATED RDW RBC AUTO: 49 FL (ref 35–45)
ERYTHROCYTE [DISTWIDTH] IN BLOOD BY AUTOMATED COUNT: 14.9 % (ref 11.5–14.5)
GFR SERPL CREATININE-BSD FRML MDRD: > 90 ML/MIN/1.73M2
GLUCOSE SERPL-MCNC: 101 MG/DL (ref 70–108)
HCT VFR BLD AUTO: 35.2 % (ref 37–47)
HGB BLD-MCNC: 10.5 GM/DL (ref 12–16)
MCH RBC QN AUTO: 27.1 PG (ref 26–33)
MCHC RBC AUTO-ENTMCNC: 29.8 GM/DL (ref 32.2–35.5)
MCV RBC AUTO: 91 FL (ref 81–99)
PLATELET # BLD AUTO: 169 THOU/MM3 (ref 130–400)
PMV BLD AUTO: 12.2 FL (ref 9.4–12.4)
POTASSIUM SERPL-SCNC: 4 MEQ/L (ref 3.5–5.2)
RBC # BLD AUTO: 3.87 MILL/MM3 (ref 4.2–5.4)
SODIUM SERPL-SCNC: 137 MEQ/L (ref 135–145)
WBC # BLD AUTO: 4.6 THOU/MM3 (ref 4.8–10.8)

## 2024-09-25 PROCEDURE — 1200000000 HC SEMI PRIVATE

## 2024-09-25 PROCEDURE — 6370000000 HC RX 637 (ALT 250 FOR IP)

## 2024-09-25 PROCEDURE — 82550 ASSAY OF CK (CPK): CPT

## 2024-09-25 PROCEDURE — 80048 BASIC METABOLIC PNL TOTAL CA: CPT

## 2024-09-25 PROCEDURE — 86038 ANTINUCLEAR ANTIBODIES: CPT

## 2024-09-25 PROCEDURE — 6360000002 HC RX W HCPCS

## 2024-09-25 PROCEDURE — 36415 COLL VENOUS BLD VENIPUNCTURE: CPT

## 2024-09-25 PROCEDURE — 99233 SBSQ HOSP IP/OBS HIGH 50: CPT | Performed by: PHYSICIAN ASSISTANT

## 2024-09-25 PROCEDURE — 85027 COMPLETE CBC AUTOMATED: CPT

## 2024-09-25 PROCEDURE — 2580000003 HC RX 258

## 2024-09-25 RX ORDER — LORAZEPAM 2 MG/ML
1 INJECTION INTRAMUSCULAR ONCE
Status: COMPLETED | OUTPATIENT
Start: 2024-09-25 | End: 2024-09-25

## 2024-09-25 RX ADMIN — SODIUM CHLORIDE, PRESERVATIVE FREE 10 ML: 5 INJECTION INTRAVENOUS at 10:49

## 2024-09-25 RX ADMIN — DIPHENHYDRAMINE HYDROCHLORIDE 50 MG: 50 INJECTION INTRAMUSCULAR; INTRAVENOUS at 00:05

## 2024-09-25 RX ADMIN — MORPHINE SULFATE 4 MG: 4 INJECTION, SOLUTION INTRAMUSCULAR; INTRAVENOUS at 00:04

## 2024-09-25 RX ADMIN — PANCRELIPASE LIPASE, PANCRELIPASE PROTEASE, PANCRELIPASE AMYLASE 20000 UNITS: 20000; 63000; 84000 CAPSULE, DELAYED RELEASE ORAL at 10:46

## 2024-09-25 RX ADMIN — PANTOPRAZOLE SODIUM 40 MG: 40 TABLET, DELAYED RELEASE ORAL at 20:35

## 2024-09-25 RX ADMIN — LIDOCAINE 4%: 4 CREAM TOPICAL at 00:47

## 2024-09-25 RX ADMIN — DIPHENHYDRAMINE HYDROCHLORIDE 50 MG: 50 INJECTION INTRAMUSCULAR; INTRAVENOUS at 23:15

## 2024-09-25 RX ADMIN — DIPHENHYDRAMINE HYDROCHLORIDE 50 MG: 50 INJECTION INTRAMUSCULAR; INTRAVENOUS at 17:49

## 2024-09-25 RX ADMIN — LORAZEPAM 1 MG: 2 INJECTION INTRAMUSCULAR; INTRAVENOUS at 00:48

## 2024-09-25 RX ADMIN — METRONIDAZOLE 500 MG: 500 TABLET ORAL at 18:51

## 2024-09-25 RX ADMIN — PANCRELIPASE LIPASE, PANCRELIPASE PROTEASE, PANCRELIPASE AMYLASE 5000 UNITS: 5000; 17000; 24000 CAPSULE, DELAYED RELEASE ORAL at 10:45

## 2024-09-25 RX ADMIN — SODIUM CHLORIDE, PRESERVATIVE FREE 10 ML: 5 INJECTION INTRAVENOUS at 20:35

## 2024-09-25 RX ADMIN — CLARITHROMYCIN 500 MG: 500 TABLET ORAL at 12:05

## 2024-09-25 RX ADMIN — PANTOPRAZOLE SODIUM 40 MG: 40 TABLET, DELAYED RELEASE ORAL at 10:48

## 2024-09-25 RX ADMIN — PANCRELIPASE LIPASE, PANCRELIPASE PROTEASE, PANCRELIPASE AMYLASE 5000 UNITS: 5000; 17000; 24000 CAPSULE, DELAYED RELEASE ORAL at 17:52

## 2024-09-25 RX ADMIN — DIPHENHYDRAMINE HYDROCHLORIDE 50 MG: 50 INJECTION INTRAMUSCULAR; INTRAVENOUS at 12:05

## 2024-09-25 RX ADMIN — METRONIDAZOLE 500 MG: 500 TABLET ORAL at 10:46

## 2024-09-25 RX ADMIN — DIPHENHYDRAMINE HYDROCHLORIDE 50 MG: 50 INJECTION INTRAMUSCULAR; INTRAVENOUS at 06:12

## 2024-09-25 RX ADMIN — PANCRELIPASE LIPASE, PANCRELIPASE PROTEASE, PANCRELIPASE AMYLASE 20000 UNITS: 20000; 63000; 84000 CAPSULE, DELAYED RELEASE ORAL at 17:52

## 2024-09-25 ASSESSMENT — PAIN DESCRIPTION - ONSET: ONSET: ON-GOING

## 2024-09-25 ASSESSMENT — PAIN DESCRIPTION - LOCATION
LOCATION: SHOULDER;ARM
LOCATION: ARM;SHOULDER
LOCATION: ARM;SHOULDER
LOCATION: SHOULDER;ARM

## 2024-09-25 ASSESSMENT — PAIN SCALES - WONG BAKER
WONGBAKER_NUMERICALRESPONSE: HURTS WHOLE LOT
WONGBAKER_NUMERICALRESPONSE: NO HURT
WONGBAKER_NUMERICALRESPONSE: HURTS WHOLE LOT
WONGBAKER_NUMERICALRESPONSE: HURTS LITTLE MORE
WONGBAKER_NUMERICALRESPONSE: HURTS WHOLE LOT
WONGBAKER_NUMERICALRESPONSE: HURTS LITTLE MORE
WONGBAKER_NUMERICALRESPONSE: HURTS WHOLE LOT

## 2024-09-25 ASSESSMENT — PAIN - FUNCTIONAL ASSESSMENT: PAIN_FUNCTIONAL_ASSESSMENT: ACTIVITIES ARE NOT PREVENTED

## 2024-09-25 ASSESSMENT — PAIN DESCRIPTION - DESCRIPTORS
DESCRIPTORS: ACHING;SORE
DESCRIPTORS: SHARP
DESCRIPTORS: ACHING
DESCRIPTORS: ACHING;SORE
DESCRIPTORS: ACHING;SORE

## 2024-09-25 ASSESSMENT — PAIN DESCRIPTION - ORIENTATION
ORIENTATION: LEFT;RIGHT
ORIENTATION: RIGHT;LEFT
ORIENTATION: LEFT;RIGHT

## 2024-09-25 ASSESSMENT — PAIN DESCRIPTION - PAIN TYPE: TYPE: ACUTE PAIN

## 2024-09-25 ASSESSMENT — PAIN SCALES - GENERAL
PAINLEVEL_OUTOF10: 0
PAINLEVEL_OUTOF10: 9
PAINLEVEL_OUTOF10: 10
PAINLEVEL_OUTOF10: 10

## 2024-09-25 ASSESSMENT — PAIN DESCRIPTION - FREQUENCY
FREQUENCY: CONTINUOUS

## 2024-09-25 NOTE — PLAN OF CARE
Problem: Discharge Planning  Goal: Discharge to home or other facility with appropriate resources  9/24/2024 2158 by Dari Abebe RN  Outcome: Progressing  9/24/2024 1241 by Jamey Welch RN  Outcome: Progressing     Problem: Pain  Goal: Verbalizes/displays adequate comfort level or baseline comfort level  9/24/2024 2158 by Dari Abebe RN  Outcome: Progressing  9/24/2024 1241 by Jamey Welch RN  Outcome: Progressing     Problem: Safety - Adult  Goal: Free from fall injury  9/24/2024 2158 by Dari Abebe RN  Outcome: Progressing  9/24/2024 1241 by Jamey Welch, RN  Outcome: Progressing

## 2024-09-25 NOTE — PROGRESS NOTES
[facility]   PROGRESS NOTE      Patient: Dixon Conn  Room #: 8A-20/020-A            YOB: 1990  Age: 34 y.o.  Gender: female            Admit Date & Time: 9/18/2024  4:26 PM    Assessment:    The patient was attempted to be visited and is currently in active care. The care is such to inhibit a spiritual care visit at this time.     Interventions:  A the patient welcomed later visits and declined a visit at this time.     Outcomes:  The patient continue to be followed by spiritual care.     Plan:  1.Spiritual care will continue to follow the patient according to [facility] spiritual care SOP.       Electronically signed by Chaplain Linh, on 9/25/2024 at 3:53 PM.  Spiritual Care Department  [facility]  [phone]     09/25/24 1553   Encounter Summary   Encounter Overview/Reason Initial Encounter   Service Provided For Patient   Referral/Consult From [facility]   Support System Unknown   Last Encounter  09/25/24   Complexity of Encounter Low   Begin Time 1520   End Time  1525   Total Time Calculated 5 min   Spiritual/Emotional needs   Type Spiritual Support   Assessment/Intervention/Outcome   Assessment Unable to assess   Intervention Prayer (assurance of)/[place]   Outcome Did not respond   Plan and Referrals   Plan/Referrals Continue to visit, (comment)

## 2024-09-25 NOTE — PROGRESS NOTES
Progress note: Infectious diseases    Patient - Dixon Conn,  Age - 34 y.o.    - 1990      Room Number - 8A-20/020-A   MRN -  [id number]   [facility] # - [id number]  Date of Admission -  2024  4:26 PM    SUBJECTIVE:   MRI report noted. She has swelling on multiple muscles.  No abscess noted on the cervical spine.  Crp and ESR normal  Asleep   OBJECTIVE   VITALS    height is 1.702 m (5' 7\") and weight is 63.5 kg (140 lb). Her oral temperature is 98.2 °F (36.8 °C). Her blood pressure is 114/68 and her pulse is 103 (abnormal). Her respiration is 18 and oxygen saturation is 98%.       Wt Readings from Last 3 Encounters:   24 63.5 kg (140 lb)   09/10/24 63.5 kg (140 lb)       I/O (24 Hours)    Intake/Output Summary (Last 24 hours) at 2024 1606  Last data filed at 2024 1053  Gross per 24 hour   Intake 320 ml   Output 0 ml   Net 320 ml      MEDICATIONS:      pantoprazole  40 mg Oral BID    lipase-protease-amylase  5,000 Units Oral TID WC    lipase-protease-amylase  20,000 Units Oral TID WC    sodium chloride flush  5-40 mL IntraVENous 2 times per day    [Held by provider] enoxaparin  40 mg SubCUTAneous Daily    clarithromycin  500 mg Oral 2 times per day    metroNIDAZOLE  500 mg Oral 3 times per day      sodium chloride Stopped (24 0916)     lidocaine, diphenhydrAMINE, oxyCODONE **OR** oxyCODONE, sodium chloride flush, sodium chloride, potassium chloride **OR** potassium alternative oral replacement **OR** potassium chloride, magnesium sulfate, ondansetron **OR** ondansetron, polyethylene glycol      LABS:     CBC:   Recent Labs     24  1042 24  0657 24  0543   WBC 4.3* 3.3* 4.6*   HGB 12.0 12.4 10.5*    182 169     BMP:    Recent Labs     24  1042 24  0657 24  0543    136 137   K 3.8 4.0 4.0    103 104   CO2 23 21* 22*   BUN 9 11 10   CREATININE

## 2024-09-25 NOTE — PROGRESS NOTES
Hospitalist Progress Note      Patient:  Dixon Conn 34 y.o. female       : 1990  Unit/Bed:8A-20/020-A    Date of Admission: 2024      ASSESSMENT AND PLAN    Active Problems  H. pylori gastritis: Patient previously hospitalized  through  for acute pancreatitis flare.  Had coffee-ground emesis with bright red streaks at that time.  GI performed EGD on  that showed prominent streaking erythema in stomach.  Biopsies obtained. Patient reached out to GI due to increased pain who told her her H. pylori antigen is positive.  Advised to return to the ED .  Initiated on triple therapy: Flagyl 500 mg 3 times daily, clindamycin 500 mg daily and pantoprazole 40 mg twice daily for 14 days.  On regular low-fat, low fiber diet  Autoimmune pancreatitis, chronic: Continue Creon  Being evaluated outpatient, history of pancreatitis flares   patient underwent ultrasound guided celiac plexus block  Left shoulder pain / ?myositis: CT scan showed edema and subcu fat of the neck and upper shoulders, more prominent left arm than right consistent with diffuse cellulitis but no fluid collection or abscess.  Patient had been on clarithromycin since , antibiotics escalated to Rocephin  ID consulted - MRI cervical spine & shoulder to rule out infection returned with edema and subcutaneous fat consistent with nonspecific cellulitis, nonspecific findings related to infectious, inflammatory or traumatic process.  Patient denies any known injury  Also noted to be normal edema and enhancement within the dorsal deltoid, inferior subscapularis, teres minor and latissimus dorsi reflective nonspecific myositis, no intramuscular fluid collection or abscess  Discussed with ID, recommending orthopedic evaluation for possible aspiration  Leukopenia: Suspect reactive, continue to monitor  Concern for Drug Seeking Behavior:   Patient advised for multiple days to attempt to decrease IV use of pain medication, however

## 2024-09-25 NOTE — CONSULTS
Reviewed LOV  rx's sent, pt notified, verbalized understanding. tablet by mouth every 12 hours for 3 doses 9/24/24 9/26/24 Yes Tuyet Rouse PA   lipase-protease-amylase (CREON) 80425-07944 units delayed release capsule Take 3 capsules by mouth 3 times daily   Yes Provider, MD Sandra   pantoprazole (PROTONIX) 40 MG tablet Take 1 tablet by mouth daily 6/22/24  Yes Sandra Brennan MD   loperamide (IMODIUM) 2 MG capsule Take 1-2 capsules by mouth as needed for Diarrhea   Yes Provider, MD Sandra       Current Hospital Medications:    Current Facility-Administered Medications:     pantoprazole (PROTONIX) tablet 40 mg, 40 mg, Oral, BID, Arley Li MD, 40 mg at 09/25/24 1048    lidocaine (LMX) 4 % cream, , Topical, PRN, Dario Valdes APRN - CNP, Given at 09/25/24 0047    diphenhydrAMINE (BENADRYL) injection 50 mg, 50 mg, IntraVENous, Q6H PRN, Linda Huerta PA-C, 50 mg at 09/25/24 1205    oxyCODONE (ROXICODONE) immediate release tablet 5 mg, 5 mg, Oral, Q4H PRN **OR** oxyCODONE (ROXICODONE) immediate release tablet 10 mg, 10 mg, Oral, Q4H PRN, Remy Arias, DO, 10 mg at 09/24/24 2200    lipase-protease-amylase (ZENPEP) delayed release capsule 5,000 Units, 5,000 Units, Oral, TID Guillermo ANDREW Brian James, MD, 5,000 Units at 09/25/24 1045    lipase-protease-amylase (ZENPEP) 02133-93597 units delayed release capsule 20,000 Units, 20,000 Units, Oral, TID Guillermo ANDREW Brian James, MD, 20,000 Units at 09/25/24 1046    sodium chloride flush 0.9 % injection 5-40 mL, 5-40 mL, IntraVENous, 2 times per day, Arley Li MD, 10 mL at 09/25/24 1049    sodium chloride flush 0.9 % injection 5-40 mL, 5-40 mL, IntraVENous, PRN, Arley Li MD, 10 mL at 09/20/24 2230    0.9 % sodium chloride infusion, , IntraVENous, PRN, RossArley MD, Stopped at 09/23/24 0916    potassium chloride (KLOR-CON M) extended release tablet 40 mEq, 40 mEq, Oral, PRN **OR** potassium bicarb-citric acid (EFFER-K) effervescent tablet 40 mEq, 40 mEq, Oral, PRN **OR** potassium  Transportation (Medical): No     Lack of Transportation (Non-Medical): No   Physical Activity: Not on file   Stress: Not on file   Social Connections: Not on file   Intimate Partner Violence: Not At Risk (7/9/2024)    Received from [facility]    Humiliation, Afraid, Rape, and Kick questionnaire     Fear of Current or Ex-Partner: No     Emotionally Abused: No     Physically Abused: No     Sexually Abused: No   Housing Stability: Low Risk  (9/18/2024)    Housing Stability Vital Sign     Unable to Pay for Housing in the Last Year: No     Number of Times Moved in the Last Year: 0     Homeless in the Last Year: No   Recent Concern: Housing Stability - High Risk (7/9/2024)    Received from [facility]    Housing Stability Vital Sign     Unable to Pay for Housing in the Last Year: Yes     Number of Times Moved in the Last Year: 3     Homeless in the Last Year: No     Family History:  No family history on file.  Further Family History is noncontributory to this injury.    REVIEW OF SYSTEMS:    Review of Systems - General ROS: negative for - chills, fatigue, fever, malaise or night sweats  Psychological ROS: negative  Ophthalmic ROS: negative   ENT ROS: negative for - headaches or sore throat  Hematological and Lymphatic ROS: negative for - bleeding problems or blood clots  Respiratory ROS: no cough, shortness of breath, or wheezing  Cardiovascular ROS: no chest pain or dyspnea on exertion  Gastrointestinal ROS: negative  Musculoskeletal ROS: See HPI  Neurological ROS: negative for - bowel and bladder control changes, gait disturbance or numbness/tingling  All other systems reviewed and are negative      PHYSICAL EXAM:  /68   Pulse (!) 103   Temp 98.2 °F (36.8 °C) (Oral)   Resp 18   Ht 1.702 m (5' 7\")   Wt 63.5 kg (140 lb)   LMP 09/04/2024 (Within Days)   SpO2 98%   BMI 21.93 kg/m²   GENERAL APPEARANCE: Awake and oriented x4. No acute distress, except appropriate to injury. She was sleeping on arrival to

## 2024-09-26 LAB
ANION GAP SERPL CALC-SCNC: 10 MEQ/L (ref 8–16)
BUN SERPL-MCNC: 8 MG/DL (ref 7–22)
CALCIUM SERPL-MCNC: 8.4 MG/DL (ref 8.5–10.5)
CHLORIDE SERPL-SCNC: 107 MEQ/L (ref 98–111)
CO2 SERPL-SCNC: 21 MEQ/L (ref 23–33)
CREAT SERPL-MCNC: 0.6 MG/DL (ref 0.4–1.2)
DEPRECATED RDW RBC AUTO: 49.1 FL (ref 35–45)
ERYTHROCYTE [DISTWIDTH] IN BLOOD BY AUTOMATED COUNT: 15.2 % (ref 11.5–14.5)
GFR SERPL CREATININE-BSD FRML MDRD: > 90 ML/MIN/1.73M2
GLUCOSE SERPL-MCNC: 108 MG/DL (ref 70–108)
HCT VFR BLD AUTO: 35.9 % (ref 37–47)
HGB BLD-MCNC: 10.8 GM/DL (ref 12–16)
MAGNESIUM SERPL-MCNC: 2.3 MG/DL (ref 1.6–2.4)
MCH RBC QN AUTO: 26.9 PG (ref 26–33)
MCHC RBC AUTO-ENTMCNC: 30.1 GM/DL (ref 32.2–35.5)
MCV RBC AUTO: 89.3 FL (ref 81–99)
NUCLEAR IGG SER QL IA: NORMAL
PLATELET # BLD AUTO: 145 THOU/MM3 (ref 130–400)
PMV BLD AUTO: 12.3 FL (ref 9.4–12.4)
POTASSIUM SERPL-SCNC: 4.8 MEQ/L (ref 3.5–5.2)
RBC # BLD AUTO: 4.02 MILL/MM3 (ref 4.2–5.4)
SODIUM SERPL-SCNC: 138 MEQ/L (ref 135–145)
WBC # BLD AUTO: 3.5 THOU/MM3 (ref 4.8–10.8)

## 2024-09-26 PROCEDURE — 6360000002 HC RX W HCPCS

## 2024-09-26 PROCEDURE — 6370000000 HC RX 637 (ALT 250 FOR IP)

## 2024-09-26 PROCEDURE — 1200000000 HC SEMI PRIVATE

## 2024-09-26 PROCEDURE — 97166 OT EVAL MOD COMPLEX 45 MIN: CPT

## 2024-09-26 PROCEDURE — 85027 COMPLETE CBC AUTOMATED: CPT

## 2024-09-26 PROCEDURE — 97162 PT EVAL MOD COMPLEX 30 MIN: CPT

## 2024-09-26 PROCEDURE — 99233 SBSQ HOSP IP/OBS HIGH 50: CPT | Performed by: INTERNAL MEDICINE

## 2024-09-26 PROCEDURE — 36415 COLL VENOUS BLD VENIPUNCTURE: CPT

## 2024-09-26 PROCEDURE — 2580000003 HC RX 258

## 2024-09-26 PROCEDURE — 83735 ASSAY OF MAGNESIUM: CPT

## 2024-09-26 PROCEDURE — 80048 BASIC METABOLIC PNL TOTAL CA: CPT

## 2024-09-26 PROCEDURE — 97116 GAIT TRAINING THERAPY: CPT

## 2024-09-26 PROCEDURE — 6360000002 HC RX W HCPCS: Performed by: INTERNAL MEDICINE

## 2024-09-26 RX ORDER — AMOXICILLIN 250 MG
1 CAPSULE ORAL NIGHTLY
Status: CANCELLED | OUTPATIENT
Start: 2024-09-27

## 2024-09-26 RX ORDER — MORPHINE SULFATE 2 MG/ML
0.5 INJECTION, SOLUTION INTRAMUSCULAR; INTRAVENOUS ONCE
Status: COMPLETED | OUTPATIENT
Start: 2024-09-26 | End: 2024-09-26

## 2024-09-26 RX ORDER — MORPHINE SULFATE 2 MG/ML
1 INJECTION, SOLUTION INTRAMUSCULAR; INTRAVENOUS ONCE
Status: COMPLETED | OUTPATIENT
Start: 2024-09-26 | End: 2024-09-26

## 2024-09-26 RX ADMIN — MORPHINE SULFATE 0.5 MG: 2 INJECTION, SOLUTION INTRAMUSCULAR; INTRAVENOUS at 16:13

## 2024-09-26 RX ADMIN — PANCRELIPASE LIPASE, PANCRELIPASE PROTEASE, PANCRELIPASE AMYLASE 20000 UNITS: 20000; 63000; 84000 CAPSULE, DELAYED RELEASE ORAL at 11:13

## 2024-09-26 RX ADMIN — SODIUM CHLORIDE, PRESERVATIVE FREE 10 ML: 5 INJECTION INTRAVENOUS at 08:43

## 2024-09-26 RX ADMIN — DIPHENHYDRAMINE HYDROCHLORIDE 50 MG: 50 INJECTION INTRAMUSCULAR; INTRAVENOUS at 04:52

## 2024-09-26 RX ADMIN — PANCRELIPASE LIPASE, PANCRELIPASE PROTEASE, PANCRELIPASE AMYLASE 20000 UNITS: 20000; 63000; 84000 CAPSULE, DELAYED RELEASE ORAL at 16:14

## 2024-09-26 RX ADMIN — PANTOPRAZOLE SODIUM 40 MG: 40 TABLET, DELAYED RELEASE ORAL at 20:54

## 2024-09-26 RX ADMIN — MORPHINE SULFATE 1 MG: 2 INJECTION, SOLUTION INTRAMUSCULAR; INTRAVENOUS at 20:54

## 2024-09-26 RX ADMIN — METRONIDAZOLE 500 MG: 500 TABLET ORAL at 16:15

## 2024-09-26 RX ADMIN — DIPHENHYDRAMINE HYDROCHLORIDE 50 MG: 50 INJECTION INTRAMUSCULAR; INTRAVENOUS at 17:51

## 2024-09-26 RX ADMIN — PANCRELIPASE LIPASE, PANCRELIPASE PROTEASE, PANCRELIPASE AMYLASE 20000 UNITS: 20000; 63000; 84000 CAPSULE, DELAYED RELEASE ORAL at 08:43

## 2024-09-26 RX ADMIN — PANCRELIPASE LIPASE, PANCRELIPASE PROTEASE, PANCRELIPASE AMYLASE 5000 UNITS: 5000; 17000; 24000 CAPSULE, DELAYED RELEASE ORAL at 08:42

## 2024-09-26 RX ADMIN — PANTOPRAZOLE SODIUM 40 MG: 40 TABLET, DELAYED RELEASE ORAL at 08:42

## 2024-09-26 RX ADMIN — METRONIDAZOLE 500 MG: 500 TABLET ORAL at 08:42

## 2024-09-26 RX ADMIN — METRONIDAZOLE 500 MG: 500 TABLET ORAL at 23:55

## 2024-09-26 RX ADMIN — PANCRELIPASE LIPASE, PANCRELIPASE PROTEASE, PANCRELIPASE AMYLASE 5000 UNITS: 5000; 17000; 24000 CAPSULE, DELAYED RELEASE ORAL at 16:14

## 2024-09-26 RX ADMIN — METRONIDAZOLE 500 MG: 500 TABLET ORAL at 00:11

## 2024-09-26 RX ADMIN — DIPHENHYDRAMINE HYDROCHLORIDE 50 MG: 50 INJECTION INTRAMUSCULAR; INTRAVENOUS at 23:55

## 2024-09-26 RX ADMIN — PANCRELIPASE LIPASE, PANCRELIPASE PROTEASE, PANCRELIPASE AMYLASE 5000 UNITS: 5000; 17000; 24000 CAPSULE, DELAYED RELEASE ORAL at 11:13

## 2024-09-26 RX ADMIN — DIPHENHYDRAMINE HYDROCHLORIDE 50 MG: 50 INJECTION INTRAMUSCULAR; INTRAVENOUS at 11:15

## 2024-09-26 RX ADMIN — SODIUM CHLORIDE, PRESERVATIVE FREE 10 ML: 5 INJECTION INTRAVENOUS at 20:54

## 2024-09-26 ASSESSMENT — PAIN DESCRIPTION - ORIENTATION
ORIENTATION: RIGHT;LEFT
ORIENTATION: RIGHT;LEFT
ORIENTATION: LEFT;RIGHT

## 2024-09-26 ASSESSMENT — PAIN DESCRIPTION - LOCATION
LOCATION: NECK;SHOULDER
LOCATION: ARM;SHOULDER

## 2024-09-26 ASSESSMENT — PAIN DESCRIPTION - FREQUENCY: FREQUENCY: CONTINUOUS

## 2024-09-26 ASSESSMENT — PAIN DESCRIPTION - DESCRIPTORS
DESCRIPTORS: ACHING;DISCOMFORT
DESCRIPTORS: ACHING;SORE
DESCRIPTORS: ACHING;CRAMPING

## 2024-09-26 ASSESSMENT — PAIN DESCRIPTION - PAIN TYPE: TYPE: ACUTE PAIN

## 2024-09-26 ASSESSMENT — PAIN SCALES - GENERAL
PAINLEVEL_OUTOF10: 9
PAINLEVEL_OUTOF10: 10
PAINLEVEL_OUTOF10: 8
PAINLEVEL_OUTOF10: 9

## 2024-09-26 ASSESSMENT — PAIN DESCRIPTION - ONSET: ONSET: ON-GOING

## 2024-09-26 ASSESSMENT — PAIN SCALES - WONG BAKER
WONGBAKER_NUMERICALRESPONSE: NO HURT

## 2024-09-26 NOTE — PROGRESS NOTES
Progress note: Infectious diseases    Patient - Dixon Conn,  Age - 34 y.o.    - 1990      Room Number - 8A-20/020-A   MRN -  [id number]   [facility] # - [id number]  Date of Admission -  2024  4:26 PM    SUBJECTIVE:   She was asking for pain medication    OBJECTIVE   VITALS    height is 1.702 m (5' 7\") and weight is 63.5 kg (140 lb). Her oral temperature is 98 °F (36.7 °C). Her blood pressure is 98/44 (abnormal) and her pulse is 81. Her respiration is 16 and oxygen saturation is 100%.       Wt Readings from Last 3 Encounters:   24 63.5 kg (140 lb)   09/10/24 63.5 kg (140 lb)       I/O (24 Hours)    Intake/Output Summary (Last 24 hours) at 2024 1010  Last data filed at 2024 1724  Gross per 24 hour   Intake 380 ml   Output 0 ml   Net 380 ml       General Appearance  Awake, alert, oriented,  not  In acute distress  HEENT - normocephalic, atraumatic, pink conjunctiva,  anicteric sclera  Neck - Supple, no mass the swelling over the trapezius muscle is less  Lungs -  Bilateral good air entry, no rhonchi, no wheeze  Cardiovascular - Heart sounds are normal.  Regular rate and rhythm without murmur, gallop or rub.  Abdomen - soft, not distended, nontender,   Neurologic -oriented  Skin - No bruising or bleeding  Extremities - No edema, no cyanosis, clubbing     MEDICATIONS:      pantoprazole  40 mg Oral BID    lipase-protease-amylase  5,000 Units Oral TID WC    lipase-protease-amylase  20,000 Units Oral TID WC    sodium chloride flush  5-40 mL IntraVENous 2 times per day    [Held by provider] enoxaparin  40 mg SubCUTAneous Daily    metroNIDAZOLE  500 mg Oral 3 times per day      sodium chloride Stopped (24 0916)     lidocaine, diphenhydrAMINE, oxyCODONE **OR** oxyCODONE, sodium chloride flush, sodium chloride, potassium chloride **OR** potassium alternative oral replacement **OR** potassium chloride,

## 2024-09-26 NOTE — PROGRESS NOTES
[facility]  INPATIENT OCCUPATIONAL THERAPY  STRZ MED SURG 8AB  EVALUATION      Discharge Recommendations: Home with assist PRN, potential need for OP therapy for shoulder ROM and shoulder pain managemnt  Equipment Recommendations: No        Time In: 08  Time Out: 0835  Timed Code Treatment Minutes: 0 Minutes  Minutes: 8          Date: 2024  Patient Name: Dixon Conn,   Gender: female      MRN: [id number]  : 1990  (34 y.o.)  Referring Practitioner: Jennifer Tomas PA-C  Diagnosis: hematemesis  Additional Pertinent Hx: Per EMR: Pt is a 33-year-old female with a history of autoimmune pancreatitis, dysautonomia presents to  ED for complaints of abdominal pain.  She recently relocated from [place].  She has a history of celiac plexus block secondary to intractable pain associated with her pancreatitis and [facility].  She has been previously hospitalized  -  for concerns of autoimmune pancreatitis flareup.  At the time she endorsed coffee-ground emesis with red streaks.  She noted a history of ulcers, cauterized 2 years prior.  EGD demonstrated prominent streaking erythema in stomach and biopsy obtained .  On  she underwent an ultrasound-guided celiac plexus block.  She continued to have symptoms, but was discharged on full liquid diet with Flagyl and Zofran.   the patient forgot to take her dose of Creon and had pizza, prompting another flareup which was associated with nonbloody vomiting.  She reached out to GI and was advised to come to the ED, having noted a positive H. pylori stool testing.  The patient was admitted and started on triple therapy, antiemetics and morphine for pain relief.  Palliative care was consulted for assistance of chronic pain management. : As soon as provider walked into room, patient is requesting provider regarding her pain medications.  She states that she needs IV Benadryl in order for the nausea to be curved.   safe position with all fall risk precautions in place.

## 2024-09-26 NOTE — PROGRESS NOTES
[facility]  INPATIENT PHYSICAL THERAPY  EVALUATION  [facility] MED SURG 8AB - 8A-20/020-A    Discharge Recommendations:Discharge Recommendations: Continue to assess pending progress, Home with assist PRN  Equipment Recommendations: No               Time In: 1313  Time Out: 1325  Timed Code Treatment Minutes: 8 Minutes  Minutes: 12          Date: 2024  Patient Name: Dixon Conn,  Gender:  female        MRN: [id number]  : 1990  (34 y.o.)      Referring Practitioner: JORGE Viera  Diagnosis: hematemesis  Additional Pertinent Hx: Per EMR: Pt is a 33-year-old female with a history of autoimmune pancreatitis, dysautonomia presents to  ED for complaints of abdominal pain.  She recently relocated from [place].  She has a history of celiac plexus block secondary to intractable pain associated with her pancreatitis and [facility].  She has been previously hospitalized  -  for concerns of autoimmune pancreatitis flareup.  At the time she endorsed coffee-ground emesis with red streaks.  She noted a history of ulcers, cauterized 2 years prior.  EGD demonstrated prominent streaking erythema in stomach and biopsy obtained .  On  she underwent an ultrasound-guided celiac plexus block.  She continued to have symptoms, but was discharged on full liquid diet with Flagyl and Zofran.   the patient forgot to take her dose of Creon and had pizza, prompting another flareup which was associated with nonbloody vomiting.  She reached out to GI and was advised to come to the ED, having noted a positive H. pylori stool testing.  The patient was admitted and started on triple therapy, antiemetics and morphine for pain relief.  Palliative care was consulted for assistance of chronic pain management. : As soon as provider walked into room, patient is requesting provider regarding her pain medications.  She states that she needs IV Benadryl in order for the nausea to be curved.   CLICK) BASIC MOBILITY  [facility] Inpatient Mobility Raw Score : 21  AM-PAC Inpatient T-Scale Score : 50.25          Modified [place]:  Premorbid Functional Status: Not Applicable  Current Functional Status:  Not Applicable    ASSESSMENT:  Activity Tolerance:  Patient tolerance of treatment:Fair.    Treatment Initiated: Treatment and education initiated within context of evaluation.  Evaluation time included review of current medical information, gathering information related to past medical, social and functional history, completion of standardized testing, formal and informal observation of tasks, assessment of data and development of plan of care and goals.  Treatment time included skilled education and facilitation of tasks to increase safety and independence with functional mobility for improved independence and quality of life.    Assessment:  Body Structures, Functions, Activity Limitations Requiring Skilled Therapeutic Intervention: Decreased functional mobility , Decreased endurance, Decreased balance, Decreased strength, Increased pain  Assessment: Dixon Conn is a 34 y.o. female that presents with hematemesis. Pt demonstrates a decrease in baseline by way of transfers and ambulation secondary to decreased activity tolerance, strength, fatigue, and balance deficits. Pt will benefit from skilled PT services throughout admission and beyond hospital discharge for improvements in functional mobility and in order to decrease fall risk and return pt to OF.     Therapy Prognosis: Excellent    Requires PT Follow-Up: Yes    Patient Education:      .    Patient Education  Education Given To: Patient  Education Provided: Role of Therapy, Plan of Care  Education Method: Verbal  Education Outcome: Verbalized understanding       Plan:  Current Treatment Recommendations: Strengthening, Balance training, Endurance training, Functional mobility training, Transfer training, Gait training, Stair training, Home exercise

## 2024-09-26 NOTE — CARE COORDINATION
9/26/24, 2:46 PM EDT    DISCHARGE ON GOING EVALUATION    [facility] day: 3  Location: 8A-20/020-A Reason for admit: Hematemesis [K92.0]  Helicobacter pylori gastritis [K29.70, B96.81]  Hematemesis with nausea [K92.0]  Nausea and vomiting, unspecified vomiting type [R11.2]     Procedures: none    Imaging since last note: 9/24 MRI L Shoulder W WO: 1. Edema in the subcutaneous fat of the superolateral shoulder consistent with nonspecific cellulitis.   2. Patchy areas of bone marrow edema and enhancement involving the distal clavicle, glenoid and to a lesser extent the humeral head. Findings are nonspecific and could be related to an infectious inflammatory or traumatic process. No osseous erosions.   3. Abnormal edema and enhancement seen within the dorsal deltoid muscle, inferior subscapularis muscle, teres minor muscle and latissimus dorsi muscle reflective of nonspecific myositis. No intramuscular fluid collection/abscess.   4. No rotator cuff tear.   5. Moderate subacromial and subdeltoid bursitis. Mild-to-moderate glenohumeral joint effusion. Septic joint/bursitis can not be excluded by imaging.   9/25 MRI C Spine W WO: Subjectively prominent cervical central cord canal as above   Question small syrinx or developing syrinx     Barriers to Discharge: Hospitalist, ID, and consult to Orthopedics. Ortho advise against biopsy or aspiration.   Continue PO Flagyl.     PCP: Schoen, Ashley, PA  Readmission Risk Score: 4.9    Patient Goals/Plan/Treatment Preferences: Planning to return home with her . No needs at this time.

## 2024-09-26 NOTE — PROGRESS NOTES
Hospitalist Progress Note      Patient:  Dixon Conn 34 y.o. female       : 1990  Unit/Bed:8A-20/020-A    Date of Admission: 2024      ASSESSMENT AND PLAN    Active Problems  H. pylori gastritis: Patient previously hospitalized  through  for acute pancreatitis flare.  Had coffee-ground emesis with bright red streaks at that time.  GI performed EGD on  that showed prominent streaking erythema in stomach.  Biopsies obtained. Patient reached out to GI due to increased pain who told her her H. pylori antigen is positive.  Advised to return to the ED .  Initiated on triple therapy: Flagyl 500 mg 3 times daily, clindamycin 500 mg daily and pantoprazole 40 mg twice daily for 14 days.  On regular low-fat, low fiber diet  Autoimmune pancreatitis, chronic: Continue Creon  Being evaluated outpatient, history of pancreatitis flares   patient underwent ultrasound guided celiac plexus block  -complaining of multiple episodes of diarrhea however none as documented by the RN-spoke to the patient regarding informing the RN if she has any more episodes and has stool count ordered.  Worried about going home with the diarrhea will consult GI if symptoms persist until tomorrow  Left shoulder pain / ?myositis: CT scan showed edema and subcu fat of the neck and upper shoulders, more prominent left arm than right consistent with diffuse cellulitis but no fluid collection or abscess.  Patient had been on clarithromycin since , antibiotics escalated to Rocephin  ID consulted - MRI cervical spine & shoulder to rule out infection returned with edema and subcutaneous fat consistent with nonspecific cellulitis, nonspecific findings related to infectious, inflammatory or traumatic process.  Patient denies any known injury  Also noted to be normal edema and enhancement within the dorsal deltoid, inferior subscapularis, teres minor and latissimus dorsi reflective nonspecific myositis, no intramuscular

## 2024-09-27 VITALS
TEMPERATURE: 98 F | SYSTOLIC BLOOD PRESSURE: 95 MMHG | OXYGEN SATURATION: 99 % | WEIGHT: 140 LBS | RESPIRATION RATE: 18 BRPM | BODY MASS INDEX: 21.97 KG/M2 | DIASTOLIC BLOOD PRESSURE: 67 MMHG | HEIGHT: 67 IN | HEART RATE: 86 BPM

## 2024-09-27 LAB
ANION GAP SERPL CALC-SCNC: 18 MEQ/L (ref 8–16)
BUN SERPL-MCNC: 9 MG/DL (ref 7–22)
CALCIUM SERPL-MCNC: 9.1 MG/DL (ref 8.5–10.5)
CHLORIDE SERPL-SCNC: 108 MEQ/L (ref 98–111)
CO2 SERPL-SCNC: 18 MEQ/L (ref 23–33)
CREAT SERPL-MCNC: 0.7 MG/DL (ref 0.4–1.2)
DEPRECATED RDW RBC AUTO: 49.3 FL (ref 35–45)
ERYTHROCYTE [DISTWIDTH] IN BLOOD BY AUTOMATED COUNT: 15.3 % (ref 11.5–14.5)
GFR SERPL CREATININE-BSD FRML MDRD: > 90 ML/MIN/1.73M2
GLUCOSE SERPL-MCNC: 100 MG/DL (ref 70–108)
HCT VFR BLD AUTO: 39 % (ref 37–47)
HGB BLD-MCNC: 11.8 GM/DL (ref 12–16)
LACTATE SERPL-SCNC: 1.5 MMOL/L (ref 0.5–2)
MCH RBC QN AUTO: 26.8 PG (ref 26–33)
MCHC RBC AUTO-ENTMCNC: 30.3 GM/DL (ref 32.2–35.5)
MCV RBC AUTO: 88.4 FL (ref 81–99)
NUCLEAR IGG SER QL IA: NORMAL
PLATELET # BLD AUTO: 205 THOU/MM3 (ref 130–400)
PMV BLD AUTO: 12.2 FL (ref 9.4–12.4)
POTASSIUM SERPL-SCNC: 3.9 MEQ/L (ref 3.5–5.2)
RBC # BLD AUTO: 4.41 MILL/MM3 (ref 4.2–5.4)
SODIUM SERPL-SCNC: 144 MEQ/L (ref 135–145)
WBC # BLD AUTO: 3.7 THOU/MM3 (ref 4.8–10.8)

## 2024-09-27 PROCEDURE — 36415 COLL VENOUS BLD VENIPUNCTURE: CPT

## 2024-09-27 PROCEDURE — 85027 COMPLETE CBC AUTOMATED: CPT

## 2024-09-27 PROCEDURE — 99239 HOSP IP/OBS DSCHRG MGMT >30: CPT | Performed by: INTERNAL MEDICINE

## 2024-09-27 PROCEDURE — 6360000002 HC RX W HCPCS

## 2024-09-27 PROCEDURE — 80048 BASIC METABOLIC PNL TOTAL CA: CPT

## 2024-09-27 PROCEDURE — 83605 ASSAY OF LACTIC ACID: CPT

## 2024-09-27 PROCEDURE — 6370000000 HC RX 637 (ALT 250 FOR IP)

## 2024-09-27 PROCEDURE — 2580000003 HC RX 258

## 2024-09-27 RX ORDER — SODIUM BICARBONATE 650 MG/1
1500 TABLET ORAL 2 TIMES DAILY
Qty: 25 TABLET | Refills: 0 | Status: SHIPPED | OUTPATIENT
Start: 2024-09-27 | End: 2024-10-02

## 2024-09-27 RX ORDER — PANTOPRAZOLE SODIUM 40 MG/1
40 TABLET, DELAYED RELEASE ORAL 2 TIMES DAILY
Qty: 30 TABLET | Refills: 3 | Status: SHIPPED | OUTPATIENT
Start: 2024-09-27

## 2024-09-27 RX ADMIN — PANTOPRAZOLE SODIUM 40 MG: 40 TABLET, DELAYED RELEASE ORAL at 08:29

## 2024-09-27 RX ADMIN — DIPHENHYDRAMINE HYDROCHLORIDE 50 MG: 50 INJECTION INTRAMUSCULAR; INTRAVENOUS at 06:21

## 2024-09-27 RX ADMIN — DIPHENHYDRAMINE HYDROCHLORIDE 50 MG: 50 INJECTION INTRAMUSCULAR; INTRAVENOUS at 12:41

## 2024-09-27 RX ADMIN — SODIUM CHLORIDE, PRESERVATIVE FREE 10 ML: 5 INJECTION INTRAVENOUS at 08:30

## 2024-09-27 RX ADMIN — PANCRELIPASE LIPASE, PANCRELIPASE PROTEASE, PANCRELIPASE AMYLASE 20000 UNITS: 20000; 63000; 84000 CAPSULE, DELAYED RELEASE ORAL at 08:30

## 2024-09-27 RX ADMIN — PANCRELIPASE LIPASE, PANCRELIPASE PROTEASE, PANCRELIPASE AMYLASE 5000 UNITS: 5000; 17000; 24000 CAPSULE, DELAYED RELEASE ORAL at 12:09

## 2024-09-27 RX ADMIN — PANCRELIPASE LIPASE, PANCRELIPASE PROTEASE, PANCRELIPASE AMYLASE 20000 UNITS: 20000; 63000; 84000 CAPSULE, DELAYED RELEASE ORAL at 12:09

## 2024-09-27 RX ADMIN — METRONIDAZOLE 500 MG: 500 TABLET ORAL at 08:29

## 2024-09-27 RX ADMIN — PANCRELIPASE LIPASE, PANCRELIPASE PROTEASE, PANCRELIPASE AMYLASE 5000 UNITS: 5000; 17000; 24000 CAPSULE, DELAYED RELEASE ORAL at 08:29

## 2024-09-27 ASSESSMENT — PAIN DESCRIPTION - LOCATION: LOCATION: ABDOMEN;SHOULDER

## 2024-09-27 ASSESSMENT — PAIN SCALES - GENERAL: PAINLEVEL_OUTOF10: 9

## 2024-09-27 ASSESSMENT — PAIN SCALES - WONG BAKER
WONGBAKER_NUMERICALRESPONSE: NO HURT

## 2024-09-27 NOTE — PROGRESS NOTES
AVS discussed with patient at bedside, work excuse given. Educated on meds and all follow up appointments. Patient verbalized understanding.

## 2024-09-27 NOTE — CARE COORDINATION
9/27/24, 1:01 PM EDT    Patient goals/plan/ treatment preferences discussed by  and .  Patient goals/plan/ treatment preferences reviewed with patient/ family.  Patient/ family verbalize understanding of discharge plan and are in agreement with goal/plan/treatment preferences.  Understanding was demonstrated using the teach back method.  AVS provided by RN at time of discharge, which includes all necessary medical information pertaining to the patients current course of illness, treatment, post-discharge goals of care, and treatment preferences.     Services At/After Discharge: None

## 2024-09-27 NOTE — PROGRESS NOTES
Progress note: Infectious diseases    Patient - Dixon Conn,  Age - 34 y.o.    - 1990      Room Number - 8A-20/020-A   MRN -  [id number]   [facility] # - [id number]  Date of Admission -  2024  4:26 PM    SUBJECTIVE:   No new issues    OBJECTIVE   VITALS    height is 1.702 m (5' 7\") and weight is 63.5 kg (140 lb). Her oral temperature is 98 °F (36.7 °C). Her blood pressure is 95/67 and her pulse is 86. Her respiration is 18 and oxygen saturation is 99%.       Wt Readings from Last 3 Encounters:   24 63.5 kg (140 lb)   09/10/24 63.5 kg (140 lb)       I/O (24 Hours)    Intake/Output Summary (Last 24 hours) at 2024 1341  Last data filed at 2024 2146  Gross per 24 hour   Intake 240 ml   Output --   Net 240 ml       General Appearance  Awake, alert, oriented,  not  In acute distress  HEENT - normocephalic, atraumatic,    The swelling on the trapezius is less  MEDICATIONS:      pantoprazole  40 mg Oral BID    lipase-protease-amylase  5,000 Units Oral TID WC    lipase-protease-amylase  20,000 Units Oral TID WC    sodium chloride flush  5-40 mL IntraVENous 2 times per day    [Held by provider] enoxaparin  40 mg SubCUTAneous Daily    metroNIDAZOLE  500 mg Oral 3 times per day      sodium chloride Stopped (24 0916)     lidocaine, diphenhydrAMINE, sodium chloride flush, sodium chloride, potassium chloride **OR** potassium alternative oral replacement **OR** potassium chloride, magnesium sulfate, ondansetron **OR** ondansetron, polyethylene glycol      LABS:     CBC:   Recent Labs     24  0543 24  0615 24  0626   WBC 4.6* 3.5* 3.7*   HGB 10.5* 10.8* 11.8*    145 205     BMP:    Recent Labs     24  0543 24  0615 24  0626    138 144   K 4.0 4.8 3.9    107 108   CO2 22* 21* 18*   BUN 10 8 9   CREATININE 0.7 0.6 0.7   GLUCOSE 101 108 100

## 2024-09-27 NOTE — PLAN OF CARE
Problem: Pain  Goal: Verbalizes/displays adequate comfort level or baseline comfort level  Outcome: Not Progressing  Flowsheets  Taken 9/27/2024 0230  Verbalizes/displays adequate comfort level or baseline comfort level:   Encourage patient to monitor pain and request assistance   Assess pain using appropriate pain scale   Administer analgesics based on type and severity of pain and evaluate response   Implement non-pharmacological measures as appropriate and evaluate response  Taken 9/26/2024 2050  Verbalizes/displays adequate comfort level or baseline comfort level:   Encourage patient to monitor pain and request assistance   Assess pain using appropriate pain scale   Administer analgesics based on type and severity of pain and evaluate response   Implement non-pharmacological measures as appropriate and evaluate response   Consider cultural and social influences on pain and pain management   Notify Licensed Independent Practitioner if interventions unsuccessful or patient reports new pain  Note: Pt repetitively asking for IV pain medication exclusively, as well as requesting nursing staff to administer PRN medications early. Discontinuation of morphine discussed with patient previously by hospitalist. Pt states hospitalist said she would order her more if pain was uncontrolled. Pt states pain is 9/10 and debilitating. Visual S/S do not suggest an uncontrolled 9/10 pain. Refer to Internal Medicine note from 9/26/24 for documentation of accurate discussion with patient.  -    Problem: Discharge Planning  Goal: Discharge to home or other facility with appropriate resources  Outcome: Adequate for Discharge  Flowsheets (Taken 9/26/2024 2056)  Discharge to home or other facility with appropriate resources:   Identify barriers to discharge with patient and caregiver   Arrange for needed discharge resources and transportation as appropriate   Identify discharge learning needs (meds, wound care, etc)   Arrange for

## 2024-09-27 NOTE — DISCHARGE SUMMARY
Hospitalist discharge note      Patient:  Dixon Conn    Unit/Bed:8A-20/020-A  YOB: 1990  MRN: [id number]   Acct: [id number]     PCP: Schoen, Ashley, PA  Date of Admission: 9/18/2024       ASSESSMENT AND PLAN    Active Problems  H. pylori gastritis: Patient previously hospitalized 9/9 through 9/13 for acute pancreatitis flare.  Had coffee-ground emesis with bright red streaks at that time.  GI performed EGD on 9/11 that showed prominent streaking erythema in stomach.  Biopsies obtained. Patient reached out to GI due to increased pain who told her her H. pylori antigen is positive.  Advised to return to the ED 9/18.  Initiated on triple therapy: Flagyl 500 mg 3 times daily, clindamycin 500 mg daily and pantoprazole 40 mg twice daily for 14 days.  On regular low-fat, low fiber diet  9/27-completed treatment while inpatient this admission  Autoimmune pancreatitis, chronic: Continue Creon  Being evaluated outpatient, history of pancreatitis flares  9/12 patient underwent ultrasound guided celiac plexus block  9/26-complaining of multiple episodes of diarrhea however none as documented by the RN-spoke to the patient regarding informing the RN if she has any more episodes and has stool count ordered.  Worried about going home with the diarrhea will consult GI if symptoms persist until tomorrow  9/27-had 1 episode of diarrhea today morning and 3 episodes last night-as per nursing is not watery or loose stool the stool appears to be soft.  Has mild anion gap metabolic acidosis likely from the diarrhea-added bicarb tablets for home-patient will call GI physician as outpatient on Monday to see if she can get an appointment as follow-up follows up with Dr. [facility]  Left shoulder pain / ?myositis: CT scan showed edema and subcu fat of the neck and upper shoulders, more prominent left arm than right consistent with diffuse cellulitis but no fluid collection or abscess.   Phone: [phone]   pantoprazole 40 MG tablet  sodium bicarbonate 650 MG tablet          PRN Meds:.    Exam:  BP 95/67   Pulse 86   Temp 98 °F (36.7 °C) (Oral)   Resp 18   Ht 1.702 m (5' 7\")   Wt 63.5 kg (140 lb)   LMP 09/04/2024 (Within Days)   SpO2 99%   BMI 21.93 kg/m²   General: Agitated, no distress, appears stated age.  Thin, malnourished  Eyes:  PERRL. Conjunctivae/corneas clear.  HENT: Head normal appearing. Nares normal. Oral mucosa moist.  Hearing intact.   Neck: Supple, with full range of motion. Trachea midline.    Respiratory:  Normal effort. Clear to auscultation, without rales or wheezes or rhonchi.  Cardiovascular: Tachycardic rate, regular rhythm with normal S1/S2 without murmurs.    No lower extremity edema.   Abdomen: Soft, diffusely tender, non-distended with normal bowel sounds.  Musculoskeletal: Bilateral shoulder tenderness, no erythema, has mild swelling and pain with movements of the left shoulder right forearm more swollen than the left forearm no redness no skin openings   skin: Warm and dry. No rashes or lesions.  Neurologic:  No focal sensory/motor deficits in the upper or lower extremities. Cranial nerves:  grossly non-focal 2-12.     Psychiatric: Alert and oriented, normal insight and thought content.  Pressured, rapid speech    Labs/Radiology: See chart or assessment above.     Electronically signed by Sanjuanita Alexandra MD on 9/27/2024 at 5:12 PM

## 2024-10-01 PROBLEM — K85.90 PANCREATITIS, UNSPECIFIED PANCREATITIS TYPE: Status: ACTIVE | Noted: 2024-10-01

## 2024-10-01 NOTE — PROGRESS NOTES
Transfer from [facility]  Hx acute on chronic autoimmune pancreatitis, co N/V just here for same problem labs ok CT abd pancreatitis M.S. and Benadryl given 36.4 92 17 136/76 100% Dr Ji adm

## 2024-10-03 ENCOUNTER — HOSPITAL ENCOUNTER (INPATIENT)
Age: 34
LOS: 8 days | Discharge: ANOTHER HOSPITAL | End: 2024-10-11
Attending: INTERNAL MEDICINE | Admitting: INTERNAL MEDICINE
Payer: OTHER GOVERNMENT

## 2024-10-03 PROCEDURE — 2580000003 HC RX 258

## 2024-10-03 PROCEDURE — 6360000002 HC RX W HCPCS

## 2024-10-03 PROCEDURE — 1200000000 HC SEMI PRIVATE

## 2024-10-03 PROCEDURE — 99223 1ST HOSP IP/OBS HIGH 75: CPT

## 2024-10-03 RX ORDER — ONDANSETRON 2 MG/ML
4 INJECTION INTRAMUSCULAR; INTRAVENOUS EVERY 6 HOURS PRN
Status: DISCONTINUED | OUTPATIENT
Start: 2024-10-03 | End: 2024-10-11 | Stop reason: HOSPADM

## 2024-10-03 RX ORDER — DIPHENHYDRAMINE HYDROCHLORIDE 50 MG/ML
25 INJECTION INTRAMUSCULAR; INTRAVENOUS EVERY 6 HOURS PRN
Status: DISCONTINUED | OUTPATIENT
Start: 2024-10-03 | End: 2024-10-03

## 2024-10-03 RX ORDER — DIPHENHYDRAMINE HYDROCHLORIDE 50 MG/ML
25 INJECTION INTRAMUSCULAR; INTRAVENOUS ONCE
Status: COMPLETED | OUTPATIENT
Start: 2024-10-03 | End: 2024-10-03

## 2024-10-03 RX ORDER — DIPHENHYDRAMINE HYDROCHLORIDE 50 MG/ML
50 INJECTION INTRAMUSCULAR; INTRAVENOUS EVERY 6 HOURS PRN
Status: DISCONTINUED | OUTPATIENT
Start: 2024-10-03 | End: 2024-10-03

## 2024-10-03 RX ORDER — MAGNESIUM SULFATE IN WATER 40 MG/ML
2000 INJECTION, SOLUTION INTRAVENOUS PRN
Status: DISCONTINUED | OUTPATIENT
Start: 2024-10-03 | End: 2024-10-11 | Stop reason: HOSPADM

## 2024-10-03 RX ORDER — DIPHENHYDRAMINE HYDROCHLORIDE 50 MG/ML
50 INJECTION INTRAMUSCULAR; INTRAVENOUS EVERY 4 HOURS PRN
Status: DISCONTINUED | OUTPATIENT
Start: 2024-10-03 | End: 2024-10-06

## 2024-10-03 RX ORDER — SODIUM CHLORIDE 0.9 % (FLUSH) 0.9 %
5-40 SYRINGE (ML) INJECTION PRN
Status: DISCONTINUED | OUTPATIENT
Start: 2024-10-03 | End: 2024-10-11 | Stop reason: HOSPADM

## 2024-10-03 RX ORDER — ONDANSETRON 4 MG/1
4 TABLET, ORALLY DISINTEGRATING ORAL EVERY 8 HOURS PRN
Status: DISCONTINUED | OUTPATIENT
Start: 2024-10-03 | End: 2024-10-11 | Stop reason: HOSPADM

## 2024-10-03 RX ORDER — SODIUM CHLORIDE 0.9 % (FLUSH) 0.9 %
5-40 SYRINGE (ML) INJECTION EVERY 12 HOURS SCHEDULED
Status: DISCONTINUED | OUTPATIENT
Start: 2024-10-03 | End: 2024-10-11 | Stop reason: HOSPADM

## 2024-10-03 RX ORDER — MORPHINE SULFATE 4 MG/ML
4 INJECTION, SOLUTION INTRAMUSCULAR; INTRAVENOUS
Status: DISCONTINUED | OUTPATIENT
Start: 2024-10-03 | End: 2024-10-05

## 2024-10-03 RX ORDER — POTASSIUM CHLORIDE 29.8 MG/ML
20 INJECTION INTRAVENOUS PRN
Status: DISCONTINUED | OUTPATIENT
Start: 2024-10-03 | End: 2024-10-03

## 2024-10-03 RX ORDER — SODIUM CHLORIDE 9 MG/ML
INJECTION, SOLUTION INTRAVENOUS PRN
Status: DISCONTINUED | OUTPATIENT
Start: 2024-10-03 | End: 2024-10-11 | Stop reason: HOSPADM

## 2024-10-03 RX ORDER — POTASSIUM CHLORIDE 7.45 MG/ML
10 INJECTION INTRAVENOUS PRN
Status: DISCONTINUED | OUTPATIENT
Start: 2024-10-03 | End: 2024-10-11 | Stop reason: HOSPADM

## 2024-10-03 RX ORDER — MORPHINE SULFATE 2 MG/ML
2 INJECTION, SOLUTION INTRAMUSCULAR; INTRAVENOUS
Status: DISCONTINUED | OUTPATIENT
Start: 2024-10-03 | End: 2024-10-05

## 2024-10-03 RX ORDER — PANTOPRAZOLE SODIUM 40 MG/1
40 TABLET, DELAYED RELEASE ORAL 2 TIMES DAILY
Status: DISCONTINUED | OUTPATIENT
Start: 2024-10-03 | End: 2024-10-04

## 2024-10-03 RX ORDER — SODIUM CHLORIDE, SODIUM LACTATE, POTASSIUM CHLORIDE, CALCIUM CHLORIDE 600; 310; 30; 20 MG/100ML; MG/100ML; MG/100ML; MG/100ML
INJECTION, SOLUTION INTRAVENOUS CONTINUOUS
Status: ACTIVE | OUTPATIENT
Start: 2024-10-03 | End: 2024-10-04

## 2024-10-03 RX ORDER — LORAZEPAM 2 MG/ML
1 INJECTION INTRAMUSCULAR ONCE
Status: COMPLETED | OUTPATIENT
Start: 2024-10-03 | End: 2024-10-03

## 2024-10-03 RX ADMIN — MORPHINE SULFATE 4 MG: 4 INJECTION, SOLUTION INTRAMUSCULAR; INTRAVENOUS at 22:43

## 2024-10-03 RX ADMIN — SODIUM CHLORIDE, POTASSIUM CHLORIDE, SODIUM LACTATE AND CALCIUM CHLORIDE: 600; 310; 30; 20 INJECTION, SOLUTION INTRAVENOUS at 23:11

## 2024-10-03 RX ADMIN — SODIUM CHLORIDE, PRESERVATIVE FREE 10 ML: 5 INJECTION INTRAVENOUS at 20:57

## 2024-10-03 RX ADMIN — LORAZEPAM 1 MG: 2 INJECTION INTRAMUSCULAR; INTRAVENOUS at 20:56

## 2024-10-03 RX ADMIN — DIPHENHYDRAMINE HYDROCHLORIDE 25 MG: 50 INJECTION INTRAMUSCULAR; INTRAVENOUS at 19:19

## 2024-10-03 RX ADMIN — MORPHINE SULFATE 4 MG: 4 INJECTION, SOLUTION INTRAMUSCULAR; INTRAVENOUS at 20:45

## 2024-10-03 RX ADMIN — DIPHENHYDRAMINE HYDROCHLORIDE 25 MG: 50 INJECTION INTRAMUSCULAR; INTRAVENOUS at 20:44

## 2024-10-03 RX ADMIN — HYDROMORPHONE HYDROCHLORIDE 0.5 MG: 1 INJECTION, SOLUTION INTRAMUSCULAR; INTRAVENOUS; SUBCUTANEOUS at 19:18

## 2024-10-03 ASSESSMENT — PAIN SCALES - GENERAL
PAINLEVEL_OUTOF10: 8
PAINLEVEL_OUTOF10: 5
PAINLEVEL_OUTOF10: 9
PAINLEVEL_OUTOF10: 5
PAINLEVEL_OUTOF10: 9
PAINLEVEL_OUTOF10: 8

## 2024-10-03 ASSESSMENT — PAIN DESCRIPTION - LOCATION
LOCATION: ABDOMEN;BACK
LOCATION: BACK;ABDOMEN
LOCATION: ABDOMEN

## 2024-10-03 ASSESSMENT — PAIN DESCRIPTION - ORIENTATION
ORIENTATION: LOWER;MID
ORIENTATION: LEFT;UPPER

## 2024-10-03 ASSESSMENT — PAIN DESCRIPTION - DESCRIPTORS
DESCRIPTORS: SHARP
DESCRIPTORS: ACHING;SHARP

## 2024-10-03 ASSESSMENT — PAIN - FUNCTIONAL ASSESSMENT: PAIN_FUNCTIONAL_ASSESSMENT: ACTIVITIES ARE NOT PREVENTED

## 2024-10-03 NOTE — H&P
Hospitalist History & Physical    Patient:  Dixon Conn    Unit/Bed:6K-27/027-A  YOB: 1990  MRN: [id number]   Acct: [id number]   PCP: Schoen, Ashley, PA  Code Status: Prior    Date of Service: Pt seen/examined on 10/03/24 and admitted to Inpatient with expected LOS greater than two midnights due to medical therapy.     Chief Complaint: headache and abdominal pain    Assessment/Plan:    Acute on chronic autoimmune pancreatitis: Complains of N/V, abdominal pain and headache. History of multiple celiac plexus blocks in the past. OSH CT A/P diffusely atrophic pancreas, mild heterogeneity around the pancreatic head and uncinate process, possible acute pancreatitis. OSH: WBC 3.8. Lipase 26. CK 89.   LR @ 100mL/hr  Continue Zenpep (substituted for Creon)  Pain control: PRN Morphine  Benadryl 50mg Q4H PRN (patient reports only thing that helps nausea)   Protonix 40mg BID  NPO  Lipase in AM  CT interpretation of outside films  GI consult  Follow up with Dietician on discharge, patient requesting further diet education    Leukopenia: WBC 3.8. Likely reactive.   CBC in AM    Dysautonomia: History of labile blood pressures.   VS per routine    ?Pain seeking behavior: Upon patient arrival she demanded pain medications and Dilaudid was ordered as well as 25mg Benadryl. Patient refusing any care until her medications changed to Morphine and 50mg Benadryl. Previously documented on chart review.         DVT prophylaxis: SCD's  Diet: No diet orders on file  PT/OT: No  Tele: No  Code Status: Prior      History of Present Illness:  Dixon Conn is a 34 y.o. female with PMHx of Chronic pancreatitis, Dysautonomia who presented to [facility] with chief complaint of headache and abdominal pain. Patient arrived as a direct admission from [facility]. Patient reports that she felt well for about 2 days after discharge (on 9/27). States she went out to eat with some friends for her birthday and ate some sushi and  Schoen, Ashley, PA for the opportunity to be involved in this patient's care.    Electronically signed by OZZIE Simon CNP on 10/3/2024 at 7:21 PM

## 2024-10-04 ENCOUNTER — APPOINTMENT (OUTPATIENT)
Dept: MRI IMAGING | Age: 34
End: 2024-10-04
Attending: INTERNAL MEDICINE
Payer: OTHER GOVERNMENT

## 2024-10-04 LAB
ALBUMIN SERPL BCG-MCNC: 3.6 G/DL (ref 3.5–5.1)
ALP SERPL-CCNC: 80 U/L (ref 38–126)
ALT SERPL W/O P-5'-P-CCNC: 34 U/L (ref 11–66)
ANION GAP SERPL CALC-SCNC: 13 MEQ/L (ref 8–16)
APTT PPP: 31 SECONDS (ref 22–38)
AST SERPL-CCNC: 36 U/L (ref 5–40)
BASOPHILS ABSOLUTE: 0 THOU/MM3 (ref 0–0.1)
BASOPHILS NFR BLD AUTO: 0.6 %
BILIRUB SERPL-MCNC: 0.3 MG/DL (ref 0.3–1.2)
BUN SERPL-MCNC: 6 MG/DL (ref 7–22)
CALCIUM SERPL-MCNC: 8.7 MG/DL (ref 8.5–10.5)
CHLORIDE SERPL-SCNC: 106 MEQ/L (ref 98–111)
CK SERPL-CCNC: 60 U/L (ref 30–135)
CO2 SERPL-SCNC: 24 MEQ/L (ref 23–33)
CREAT SERPL-MCNC: 0.7 MG/DL (ref 0.4–1.2)
CRP SERPL-MCNC: < 0.3 MG/DL (ref 0–1)
DEPRECATED RDW RBC AUTO: 48 FL (ref 35–45)
EOSINOPHIL NFR BLD AUTO: 3.5 %
EOSINOPHILS ABSOLUTE: 0.1 THOU/MM3 (ref 0–0.4)
ERYTHROCYTE [DISTWIDTH] IN BLOOD BY AUTOMATED COUNT: 14.7 % (ref 11.5–14.5)
ERYTHROCYTE [SEDIMENTATION RATE] IN BLOOD BY WESTERGREN METHOD: 16 MM/HR (ref 0–20)
GFR SERPL CREATININE-BSD FRML MDRD: > 90 ML/MIN/1.73M2
GLUCOSE SERPL-MCNC: 112 MG/DL (ref 70–108)
HCT VFR BLD AUTO: 36.7 % (ref 37–47)
HGB BLD-MCNC: 11.1 GM/DL (ref 12–16)
IMM GRANULOCYTES # BLD AUTO: 0 THOU/MM3 (ref 0–0.07)
IMM GRANULOCYTES NFR BLD AUTO: 0 %
INR PPP: 1.01 (ref 0.85–1.13)
LIPASE SERPL-CCNC: 18.9 U/L (ref 5.6–51.3)
LYMPHOCYTES ABSOLUTE: 1 THOU/MM3 (ref 1–4.8)
LYMPHOCYTES NFR BLD AUTO: 32.5 %
MAGNESIUM SERPL-MCNC: 2 MG/DL (ref 1.6–2.4)
MCH RBC QN AUTO: 27 PG (ref 26–33)
MCHC RBC AUTO-ENTMCNC: 30.2 GM/DL (ref 32.2–35.5)
MCV RBC AUTO: 89.3 FL (ref 81–99)
MONOCYTES ABSOLUTE: 0.3 THOU/MM3 (ref 0.4–1.3)
MONOCYTES NFR BLD AUTO: 9.9 %
NEUTROPHILS ABSOLUTE: 1.7 THOU/MM3 (ref 1.8–7.7)
NEUTROPHILS NFR BLD AUTO: 53.5 %
NRBC BLD AUTO-RTO: 0 /100 WBC
PLATELET # BLD AUTO: 175 THOU/MM3 (ref 130–400)
PMV BLD AUTO: 12.1 FL (ref 9.4–12.4)
POTASSIUM SERPL-SCNC: 3.5 MEQ/L (ref 3.5–5.2)
PROCALCITONIN SERPL IA-MCNC: 0.04 NG/ML (ref 0.01–0.09)
PROT SERPL-MCNC: 6.5 G/DL (ref 6.1–8)
RBC # BLD AUTO: 4.11 MILL/MM3 (ref 4.2–5.4)
SODIUM SERPL-SCNC: 143 MEQ/L (ref 135–145)
TRIGL SERPL-MCNC: 83 MG/DL (ref 0–199)
WBC # BLD AUTO: 3.1 THOU/MM3 (ref 4.8–10.8)

## 2024-10-04 PROCEDURE — 80321 ALCOHOLS BIOMARKERS 1OR 2: CPT

## 2024-10-04 PROCEDURE — 2580000003 HC RX 258

## 2024-10-04 PROCEDURE — 71552 MRI CHEST W/O & W/DYE: CPT

## 2024-10-04 PROCEDURE — 85610 PROTHROMBIN TIME: CPT

## 2024-10-04 PROCEDURE — 87040 BLOOD CULTURE FOR BACTERIA: CPT

## 2024-10-04 PROCEDURE — 6360000002 HC RX W HCPCS: Performed by: PHYSICIAN ASSISTANT

## 2024-10-04 PROCEDURE — 83735 ASSAY OF MAGNESIUM: CPT

## 2024-10-04 PROCEDURE — 80053 COMPREHEN METABOLIC PANEL: CPT

## 2024-10-04 PROCEDURE — 72156 MRI NECK SPINE W/O & W/DYE: CPT

## 2024-10-04 PROCEDURE — 6370000000 HC RX 637 (ALT 250 FOR IP): Performed by: STUDENT IN AN ORGANIZED HEALTH CARE EDUCATION/TRAINING PROGRAM

## 2024-10-04 PROCEDURE — 83690 ASSAY OF LIPASE: CPT

## 2024-10-04 PROCEDURE — 6360000004 HC RX CONTRAST MEDICATION: Performed by: STUDENT IN AN ORGANIZED HEALTH CARE EDUCATION/TRAINING PROGRAM

## 2024-10-04 PROCEDURE — 6360000002 HC RX W HCPCS: Performed by: STUDENT IN AN ORGANIZED HEALTH CARE EDUCATION/TRAINING PROGRAM

## 2024-10-04 PROCEDURE — 36415 COLL VENOUS BLD VENIPUNCTURE: CPT

## 2024-10-04 PROCEDURE — 81404 MOPATH PROCEDURE LEVEL 5: CPT

## 2024-10-04 PROCEDURE — 84478 ASSAY OF TRIGLYCERIDES: CPT

## 2024-10-04 PROCEDURE — 1200000000 HC SEMI PRIVATE

## 2024-10-04 PROCEDURE — 87147 CULTURE TYPE IMMUNOLOGIC: CPT

## 2024-10-04 PROCEDURE — 99233 SBSQ HOSP IP/OBS HIGH 50: CPT | Performed by: STUDENT IN AN ORGANIZED HEALTH CARE EDUCATION/TRAINING PROGRAM

## 2024-10-04 PROCEDURE — 85651 RBC SED RATE NONAUTOMATED: CPT

## 2024-10-04 PROCEDURE — A9579 GAD-BASE MR CONTRAST NOS,1ML: HCPCS | Performed by: STUDENT IN AN ORGANIZED HEALTH CARE EDUCATION/TRAINING PROGRAM

## 2024-10-04 PROCEDURE — 81405 MOPATH PROCEDURE LEVEL 6: CPT

## 2024-10-04 PROCEDURE — 81223 CFTR GENE FULL SEQUENCE: CPT

## 2024-10-04 PROCEDURE — 86140 C-REACTIVE PROTEIN: CPT

## 2024-10-04 PROCEDURE — 84145 PROCALCITONIN (PCT): CPT

## 2024-10-04 PROCEDURE — 6370000000 HC RX 637 (ALT 250 FOR IP)

## 2024-10-04 PROCEDURE — 6360000002 HC RX W HCPCS

## 2024-10-04 PROCEDURE — 82550 ASSAY OF CK (CPK): CPT

## 2024-10-04 PROCEDURE — 87801 DETECT AGNT MULT DNA AMPLI: CPT

## 2024-10-04 PROCEDURE — 73223 MRI JOINT UPR EXTR W/O&W/DYE: CPT

## 2024-10-04 PROCEDURE — 85025 COMPLETE CBC W/AUTO DIFF WBC: CPT

## 2024-10-04 PROCEDURE — 85730 THROMBOPLASTIN TIME PARTIAL: CPT

## 2024-10-04 RX ORDER — ENOXAPARIN SODIUM 100 MG/ML
40 INJECTION SUBCUTANEOUS DAILY
Status: DISCONTINUED | OUTPATIENT
Start: 2024-10-04 | End: 2024-10-11 | Stop reason: HOSPADM

## 2024-10-04 RX ORDER — ACETAMINOPHEN 500 MG
1000 TABLET ORAL ONCE
Status: COMPLETED | OUTPATIENT
Start: 2024-10-04 | End: 2024-10-04

## 2024-10-04 RX ORDER — LORAZEPAM 2 MG/ML
0.5 INJECTION INTRAMUSCULAR ONCE
Status: COMPLETED | OUTPATIENT
Start: 2024-10-04 | End: 2024-10-04

## 2024-10-04 RX ORDER — ACETAMINOPHEN 325 MG/1
650 TABLET ORAL EVERY 6 HOURS SCHEDULED
Status: DISCONTINUED | OUTPATIENT
Start: 2024-10-04 | End: 2024-10-05

## 2024-10-04 RX ORDER — LORAZEPAM 2 MG/ML
1 INJECTION INTRAMUSCULAR ONCE
Status: COMPLETED | OUTPATIENT
Start: 2024-10-04 | End: 2024-10-04

## 2024-10-04 RX ORDER — PANTOPRAZOLE SODIUM 40 MG/10ML
40 INJECTION, POWDER, LYOPHILIZED, FOR SOLUTION INTRAVENOUS 2 TIMES DAILY
Status: DISCONTINUED | OUTPATIENT
Start: 2024-10-04 | End: 2024-10-11 | Stop reason: HOSPADM

## 2024-10-04 RX ADMIN — MORPHINE SULFATE 4 MG: 4 INJECTION, SOLUTION INTRAMUSCULAR; INTRAVENOUS at 16:30

## 2024-10-04 RX ADMIN — SODIUM CHLORIDE, PRESERVATIVE FREE 10 ML: 5 INJECTION INTRAVENOUS at 08:31

## 2024-10-04 RX ADMIN — DIPHENHYDRAMINE HYDROCHLORIDE 50 MG: 50 INJECTION INTRAMUSCULAR; INTRAVENOUS at 08:30

## 2024-10-04 RX ADMIN — MORPHINE SULFATE 4 MG: 4 INJECTION, SOLUTION INTRAMUSCULAR; INTRAVENOUS at 20:57

## 2024-10-04 RX ADMIN — DIPHENHYDRAMINE HYDROCHLORIDE 50 MG: 50 INJECTION INTRAMUSCULAR; INTRAVENOUS at 20:16

## 2024-10-04 RX ADMIN — MORPHINE SULFATE 4 MG: 4 INJECTION, SOLUTION INTRAMUSCULAR; INTRAVENOUS at 10:31

## 2024-10-04 RX ADMIN — SODIUM CHLORIDE, PRESERVATIVE FREE 10 ML: 5 INJECTION INTRAVENOUS at 20:22

## 2024-10-04 RX ADMIN — MORPHINE SULFATE 4 MG: 4 INJECTION, SOLUTION INTRAMUSCULAR; INTRAVENOUS at 14:28

## 2024-10-04 RX ADMIN — MORPHINE SULFATE 4 MG: 4 INJECTION, SOLUTION INTRAMUSCULAR; INTRAVENOUS at 00:39

## 2024-10-04 RX ADMIN — PANCRELIPASE LIPASE, PANCRELIPASE PROTEASE, PANCRELIPASE AMYLASE 20000 UNITS: 20000; 63000; 84000 CAPSULE, DELAYED RELEASE ORAL at 08:31

## 2024-10-04 RX ADMIN — LORAZEPAM 0.5 MG: 2 INJECTION INTRAMUSCULAR; INTRAVENOUS at 17:29

## 2024-10-04 RX ADMIN — PANTOPRAZOLE SODIUM 40 MG: 40 INJECTION, POWDER, FOR SOLUTION INTRAVENOUS at 11:34

## 2024-10-04 RX ADMIN — MORPHINE SULFATE 4 MG: 4 INJECTION, SOLUTION INTRAMUSCULAR; INTRAVENOUS at 19:01

## 2024-10-04 RX ADMIN — MORPHINE SULFATE 4 MG: 4 INJECTION, SOLUTION INTRAMUSCULAR; INTRAVENOUS at 12:28

## 2024-10-04 RX ADMIN — DIPHENHYDRAMINE HYDROCHLORIDE 50 MG: 50 INJECTION INTRAMUSCULAR; INTRAVENOUS at 16:31

## 2024-10-04 RX ADMIN — LORAZEPAM 1 MG: 2 INJECTION INTRAMUSCULAR; INTRAVENOUS at 20:31

## 2024-10-04 RX ADMIN — DIPHENHYDRAMINE HYDROCHLORIDE 50 MG: 50 INJECTION INTRAMUSCULAR; INTRAVENOUS at 04:27

## 2024-10-04 RX ADMIN — MORPHINE SULFATE 4 MG: 4 INJECTION, SOLUTION INTRAMUSCULAR; INTRAVENOUS at 08:30

## 2024-10-04 RX ADMIN — PANTOPRAZOLE SODIUM 40 MG: 40 TABLET, DELAYED RELEASE ORAL at 08:31

## 2024-10-04 RX ADMIN — GADOTERIDOL 10 ML: 279.3 INJECTION, SOLUTION INTRAVENOUS at 19:11

## 2024-10-04 RX ADMIN — SODIUM CHLORIDE, POTASSIUM CHLORIDE, SODIUM LACTATE AND CALCIUM CHLORIDE: 600; 310; 30; 20 INJECTION, SOLUTION INTRAVENOUS at 10:32

## 2024-10-04 RX ADMIN — PANCRELIPASE LIPASE, PANCRELIPASE PROTEASE, PANCRELIPASE AMYLASE 15000 UNITS: 5000; 17000; 24000 CAPSULE, DELAYED RELEASE ORAL at 08:31

## 2024-10-04 RX ADMIN — DIPHENHYDRAMINE HYDROCHLORIDE 50 MG: 50 INJECTION INTRAMUSCULAR; INTRAVENOUS at 00:34

## 2024-10-04 RX ADMIN — MORPHINE SULFATE 4 MG: 4 INJECTION, SOLUTION INTRAMUSCULAR; INTRAVENOUS at 02:33

## 2024-10-04 RX ADMIN — PANTOPRAZOLE SODIUM 40 MG: 40 INJECTION, POWDER, FOR SOLUTION INTRAVENOUS at 20:16

## 2024-10-04 RX ADMIN — SODIUM CHLORIDE, POTASSIUM CHLORIDE, SODIUM LACTATE AND CALCIUM CHLORIDE: 600; 310; 30; 20 INJECTION, SOLUTION INTRAVENOUS at 15:30

## 2024-10-04 RX ADMIN — MORPHINE SULFATE 4 MG: 4 INJECTION, SOLUTION INTRAMUSCULAR; INTRAVENOUS at 04:27

## 2024-10-04 RX ADMIN — MORPHINE SULFATE 4 MG: 4 INJECTION, SOLUTION INTRAMUSCULAR; INTRAVENOUS at 06:29

## 2024-10-04 RX ADMIN — DIPHENHYDRAMINE HYDROCHLORIDE 50 MG: 50 INJECTION INTRAMUSCULAR; INTRAVENOUS at 12:29

## 2024-10-04 RX ADMIN — ACETAMINOPHEN 1000 MG: 500 TABLET ORAL at 13:17

## 2024-10-04 RX ADMIN — ENOXAPARIN SODIUM 40 MG: 100 INJECTION SUBCUTANEOUS at 20:22

## 2024-10-04 RX ADMIN — LORAZEPAM 0.5 MG: 2 INJECTION INTRAMUSCULAR; INTRAVENOUS at 14:15

## 2024-10-04 ASSESSMENT — PAIN DESCRIPTION - ORIENTATION
ORIENTATION: LEFT
ORIENTATION: MID
ORIENTATION: MID
ORIENTATION: LEFT;UPPER
ORIENTATION: MID
ORIENTATION: LEFT
ORIENTATION: LEFT
ORIENTATION: LEFT;UPPER
ORIENTATION: LEFT

## 2024-10-04 ASSESSMENT — PAIN DESCRIPTION - DESCRIPTORS
DESCRIPTORS: ACHING
DESCRIPTORS: SHARP;PRESSURE
DESCRIPTORS: ACHING
DESCRIPTORS: ACHING;STABBING
DESCRIPTORS: SHOOTING
DESCRIPTORS: ACHING;SHOOTING
DESCRIPTORS: PRESSURE;SHARP
DESCRIPTORS: ACHING
DESCRIPTORS: ACHING;SHOOTING

## 2024-10-04 ASSESSMENT — PAIN SCALES - GENERAL
PAINLEVEL_OUTOF10: 5
PAINLEVEL_OUTOF10: 9
PAINLEVEL_OUTOF10: 5
PAINLEVEL_OUTOF10: 4
PAINLEVEL_OUTOF10: 7
PAINLEVEL_OUTOF10: 9
PAINLEVEL_OUTOF10: 5
PAINLEVEL_OUTOF10: 5
PAINLEVEL_OUTOF10: 7
PAINLEVEL_OUTOF10: 7
PAINLEVEL_OUTOF10: 9
PAINLEVEL_OUTOF10: 8
PAINLEVEL_OUTOF10: 9

## 2024-10-04 ASSESSMENT — PAIN DESCRIPTION - LOCATION
LOCATION: ABDOMEN
LOCATION: ABDOMEN;BACK
LOCATION: ABDOMEN;SHOULDER
LOCATION: NECK;ABDOMEN
LOCATION: ABDOMEN
LOCATION: NECK;ABDOMEN
LOCATION: BACK;ABDOMEN
LOCATION: NECK;ABDOMEN
LOCATION: SHOULDER;NECK
LOCATION: ABDOMEN;BACK

## 2024-10-04 NOTE — PLAN OF CARE
Problem: Discharge Planning  Goal: Discharge to home or other facility with appropriate resources  Outcome: Progressing  Flowsheets (Taken 10/4/2024 0815)  Discharge to home or other facility with appropriate resources: Identify barriers to discharge with patient and caregiver     Problem: Risk for Elopement  Goal: Patient will not exit the unit/facility without proper excort  Outcome: Progressing  Flowsheets (Taken 10/4/2024 0815)  Nursing Interventions for Elopement Risk: Assist with personal care needs such as toileting, eating, dressing, as needed to reduce the risk of wandering     Problem: Pain  Goal: Verbalizes/displays adequate comfort level or baseline comfort level  Outcome: Progressing  Flowsheets (Taken 10/4/2024 1041)  Verbalizes/displays adequate comfort level or baseline comfort level: Encourage patient to monitor pain and request assistance   Patient aware and updated on plan of care

## 2024-10-04 NOTE — CARE COORDINATION
10/04/24 1159   Readmission Assessment   Number of Days since last admission? 1-7 days   Previous Disposition Home with Family   Who is being Interviewed Patient   What was the patient's/caregiver's perception as to why they think they needed to return back to the hospital? Other (Comment)  (pancreatitis again)   Did you visit your Primary Care Physician after you left the hospital, before you returned this time? No   Why weren't you able to visit your PCP? Other (Comment)  (readmitted)   Did you see a specialist, such as Cardiac, Pulmonary, Orthopedic Physician, etc. after you left the hospital? No   Who advised the patient to return to the hospital? Self-referral   Does the patient report anything that got in the way of taking their medications? No   In our efforts to provide the best possible care to you and others like you, can you think of anything that we could have done to help you after you left the hospital the first time, so that you might not have needed to return so soon? Other (Comment)  (Felt a little rushed for discharge but reports she had been tolerating being off IV meds longer this time.)

## 2024-10-04 NOTE — CARE COORDINATION
Case Management Assessment Initial Evaluation    Date/Time of Evaluation: 10/4/2024 8:58 AM  Assessment Completed by: Kandis Aguila RN    If patient is discharged prior to next notation, then this note serves as note for discharge by case management.    Patient Name: Dixon Conn                   YOB: 1990  Diagnosis: Pancreatitis, unspecified pancreatitis type [K85.90]                   Date / Time: 10/3/2024  6:10 PM  Location: [address] [place] [address]     Patient Admission Status: Inpatient   Readmission Risk Low 0-14, Mod 15-19), High > 20: Readmission Risk Score: 8.7    Current PCP: Schoen, Ashley, PA  Health Care Decision Makers:   Primary Decision Maker: Tom Conn - Spouse - [phone]    Additional Case Management Notes: Admitted with pancreatitis. GI consult. IVF. IV benadryl. Zenep. Protonix. PRN analgesics and antiemetics.     Procedures: none    Imaging: none    Patient Goals/Plan/Treatment Preferences: Spoke with Dixon, plans home at discharge. She is independent, denied home needs. Will likely need transport back to car.       10/04/24 1201   Service Assessment   Patient Orientation Alert and Oriented   Cognition Alert   History Provided By Patient   Primary Caregiver Self   Support Systems Spouse/Significant Other   Patient's Healthcare Decision Maker is: Patient Declined (Legal Next of Kin Remains as Decision Maker)   PCP Verified by CM Yes   Last Visit to PCP Within last 3 months   Prior Functional Level Independent in ADLs/IADLs   Current Functional Level Independent in ADLs/IADLs   Can patient return to prior living arrangement Yes   Ability to make needs known: Good   Family able to assist with home care needs: Yes   Would you like for me to discuss the discharge plan with any other family members/significant others, and if so, who? No   Financial Resources [place]Other (Comment)  (commercial)   Community Resources None   Social/Functional History   Active  Yes    Discharge Planning   Type of Residence House   Living Arrangements Spouse/Significant Other   Current Services Prior To Admission None   Potential Assistance Needed N/A   DME Ordered? No   Potential Assistance Purchasing Medications No   Type of Home Care Services None   Services At/After Discharge   Transition of Care Consult (CM Consult) Discharge Planning   Services At/After Discharge None   Mode of Transport at Discharge Other (see comment)  (reports she will need cab back to her car)   Confirm Follow Up Transport Self   Condition of Participation: Discharge Planning   The Plan for Transition of Care is related to the following treatment goals: pain to improve

## 2024-10-04 NOTE — CONSULTS
Orthopaedic Consult  Patient:  Dixon Conn  YOB: 1990  MRN: [id number]     Acct: [id number]    PCP: Schoen, Ashley, PA  Date of Admission: 10/3/2024  Date of Service: Pt seen/examined on 10/4/2024     Chief Complaint: L shoulder swelling     History Of Present Illness: 34 year old RHD female who presented as a transfer from [facility] last evening with headache and abdominal pain. She now complaints of L shoulder pain and swelling.     She was seen by ortho for same complaint at prior admission less than 2 weeks ago. She denies any recent trauma or any real changes upon discharge, her pain had improved at last admission but she cannot delineate a timeline for its return. Can anticipate this is atraumatic.     Her pain is to the entire LUE and occasionally the RUE however this is not of concern to her today, predominately at the neck and is worsened with entire extremity palpation and ROM, no paresthesias, admits to generalized weakness, aching in nature, does not tolerate excessive palpation to the neck or shoulder. Denies any other lower extremity issues or concerns.      Hx autoimmune pancreatitis, dysautonomia     States she had a prior spinal abscess which presented with shoulder swelling as well, however no indication in lengthy chart review,no evidence of surgical intervention and she states this resolved on its own    Past Medical History:    No past medical history on file.    Past Surgical History:        Procedure Laterality Date    UPPER GASTROINTESTINAL ENDOSCOPY  9/11/2024    ESOPHAGOGASTRODUODENOSCOPY BIOPSY performed by Endy Porter MD at [facility] ENDOSCOPY    UPPER GASTROINTESTINAL ENDOSCOPY N/A 9/12/2024    Celiac Block Injection performed by Marika Vergara MD at [facility] ENDOSCOPY    UPPER GASTROINTESTINAL ENDOSCOPY Left 9/12/2024    ENDOSCOPIC ULTRASOUND performed by Marika Vergara MD at [facility] ENDOSCOPY       Home Medications:   Prior to Admission medications   Pt left ED ambulatory with mother at this time. Mother and pt verbalized understanding of discharge instructions.    improved and nearly resolved. She presented to OSH again for abd pain and headache, now notes left shoulder pain and redness upon admission. She denies any new trauma and did not complain of this while at [facility]. Clinically her actual shoulder joint does not appear to be involved in terms of ROM, her pain is predominately isolated to the swelling at the platysma and trapezius interface, her deltoid is not affected in terms of function or pain either. Will need advanced imaging to better delineate if there is any appreciable abscess although clinical suspicion is low.       PLAN:    -inflammatory markers  -advanced imaging to better identify location of /feasibility for aspiration, diagnostic and therapeutic measures, discussed with hospitalist   -pain control per primary  -please note pain seeking behavior  -myositis vs underlying abscess, although her exam today is less concerning for underlying fluid collection, this could be result of patient with continued head posturing and very repetitive grabbing and pulling with her right hand to the left neck and shoulder as she was on examination today  -ID following    Ortho to follow advanced imaging    Patient history physical and plan were reviewed with Dr Zepeda, he was in agreement with the plan.

## 2024-10-04 NOTE — PROGRESS NOTES
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 History of multiple celiac plexus blocks in the past. OSH CT A/P diffusely atrophic pancreas, mild heterogeneity around the pancreatic head and uncinate process, possible acute pancreatitis. OSH: WBC 3.8. Lipase 26. CK 89.   On IV fluids  Continue Zenpep (substituted for Creon)  Pain control, plan to cut down on pain medications tomorrow  Protonix 40mg BID  NPO  CT interpretation of outside films  GI following, appreciate recommendations  Nutrition consult per patient request     Left shoulder fluid collection, shoulder pain: Underwent extensive workup previous admission, see discharge summary dated on 9/18/2024, was believed to be myositis.  ID and Ortho reconsulted.  Ordered MRI scans as well.  Sed rate and CRP negative.  Hold off on antibiotics for now per ID.    Trouble swallowing: Reports history of cervical spine abscess.  MRI of cervical spine and CT soft tissue neck pending.  SLP eval    Leukopenia: WBC 3.8. Likely reactive.   Will monitor     Dysautonomia: History of labile blood pressures.   Will monitor     ?Pain seeking behavior: Upon patient arrival she demanded pain medications and Dilaudid was ordered as well as 25mg Benadryl. Patient refusing any care until her medications changed to Morphine and 50mg Benadryl. Previously documented on chart review.  Plan to cut down on her pain medications tomorrow.   Diarrhea: C. difficile and GI panel      LDA: []CVC / []PICC / []Midline / []Dixon / []Drains / []Mediport / [x]None  Antibiotics: No  Steroids: No  Labs (still needed?): [x]Yes / []No  IVF (still needed?): [x]Yes /  Normal effort. Clear to auscultation, without rales or wheezes or rhonchi.  Cardiovascular: Normal rate, regular rhythm with normal S1/S2 without murmurs.    No lower extremity edema.   Abdomen: Soft, mild tenderness in epigastric region, non-distended with normal bowel sounds.  Musculoskeletal: No joint swelling or tenderness. Normal tone. No abnormal movements.   Skin: Warm and dry. No rashes or lesions.  Fluid collection present on left trapezius muscle with erythema and tenderness present.  Neurologic:  No focal sensory/motor deficits in the upper or lower extremities except for reduced sensation on left upper extremity. Cranial nerves:  grossly non-focal 2-12.     Psychiatric: Alert and oriented, normal insight and thought content.   Capillary Refill: Brisk,< 3 seconds.  Peripheral Pulses: +2 palpable, equal bilaterally.     Lab Results   Component Value Date    WBC 3.1 (L) 10/04/2024    HGB 11.1 (L) 10/04/2024    HCT 36.7 (L) 10/04/2024     10/04/2024    TRIG 83 10/04/2024    ALT 34 10/04/2024    AST 36 10/04/2024     10/04/2024    K 3.5 10/04/2024     10/04/2024    CREATININE 0.7 10/04/2024    BUN 6 (L) 10/04/2024    CO2 24 10/04/2024    INR 1.01 10/04/2024        Microbiology:    Blood culture #1: No results found for: \"BC\"    Blood culture #2:No results found for: \"BLOODCULT2\"    Organism:No results found for: \"ORG\"    No results found for: \"LABGRAM\"    MRSA culture only:No results found for: \"MRSAC\"    Urine culture: No results found for: \"LABURIN\"    Respiratory culture: No results found for: \"CULTRESP\"    Aerobic and Anaerobic :  No results found for: \"LABAERO\"  No results found for: \"LABANAE\"    Urinalysis:      Lab Results   Component Value Date/Time    NITRU NEGATIVE 09/18/2024 05:00 PM    BLOODU NEGATIVE 09/18/2024 05:00 PM    GLUCOSEU NEGATIVE 09/18/2024 05:00 PM       Radiology:  MRI SHOULDER LEFT W WO CONTRAST    (Results Pending)   MRI CERVICAL SPINE W WO CONTRAST    (Results

## 2024-10-04 NOTE — CONSULTS
CONSULTATION NOTE :ID       Patient - Dixon Conn,  Age - 34 y.o.    - 1990      Room Number - 6K-27/027-A   MRN -  [id number]   [facility] # - [id number]  Date of Admission -  10/3/2024  6:10 PM  Patient's PCP: Schoen, Ashley, PA     Requesting Physician: Behl, Ashita, MD    REASON FOR CONSULTATION   Evaluate left scapular swelling  CHIEF COMPLAINT   Abdominal pain with flareup of chronic pancreatitis    HISTORY OF PRESENT ILLNESS       This is a 34 y.o. female who was admitted to the hospital with a chief complaints of abdominal pain with flareup of chronic pancreatitis.  Patient has known history of atrophic pancreatitis with chronic pain.  She was recently discharged from the same facility.  ID service was requested to evaluate patient because she has swelling on her left trapezius muscle.  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  Her markers of inflammation during her last hospitalization were normal she reports felt that this was myositis likely related to her possible autoimmune disease.  She is scheduled to see rheumatologist in March.  She denies any fever or chills she complains of neck pain radiating to her left upper arm which was similar to what she had the last time.  She has been getting morphine 2 to 4 mg every 2 hours Benadryl 50 mg IV every 4 hours as needed.    PAST MEDICAL  HISTORY   Pancreatitis  Chronic pain    PAST SURGICAL HISTORY     Past Surgical History:   Procedure Laterality Date    UPPER GASTROINTESTINAL ENDOSCOPY  2024    ESOPHAGOGASTRODUODENOSCOPY BIOPSY performed by Endy Porter MD at [facility] ENDOSCOPY    UPPER GASTROINTESTINAL ENDOSCOPY N/A 2024    Celiac Block Injection performed by Marika Vergara MD at [facility] ENDOSCOPY    UPPER GASTROINTESTINAL ENDOSCOPY Left 2024    ENDOSCOPIC ULTRASOUND performed by Marika Vergara MD at [facility] ENDOSCOPY         MEDICATIONS:       Scheduled

## 2024-10-04 NOTE — CONSULTS
Consult History & Physical      Patient:  Dixon Conn  YOB: 1990  MRN: [id number]     Acct: [id number]    Chief Complaint:  No chief complaint on file.      Date of Service: Pt seen/examined in consultation on 10/4/2024    History Of Present Illness:      Dixon is a 34 y.o. female with PMH of pancreatitis, adrenal cortical insufficiency, pituitary tumor, avascular necrosis, ulcerative colitis, and kidney stones who we are asked to see/evaluate by Behl, Ashita, MD for medical management of acute on chronic pancreatitis. She has a history of autoimmune pancreatitis and was previously seen in [place]. She has had several celiac plexus blocks in the past. Patient presented to [facility] 2 days after recent discharge with reports of abdominal pain radiating to back, N/V and Diarrhea. She stated she went out to eat with some friends for her birthday and ate sushi, and thinks this may have caused another flare up. She has been seen at  in the past and established care. She takes Creon for chronic pancreatitis/diarrhea management. She had a celiac plexus block completed on 9/12/24 with Dr. Vergara. Denies CP, SOB, hematochezia or hematemesis. CT A/P done at outside facility demonstrated diffusely atrophic pancreas, mild heterogeneity around the pancreatic head and uncinate process, possible acute pancreatitis. Lipase 18.9    Past Medical History:    No past medical history on file.    Home Medications:  Prior to Admission medications    Medication Sig Start Date End Date Taking? Authorizing Provider   pantoprazole (PROTONIX) 40 MG tablet Take 1 tablet by mouth in the morning and at bedtime 9/27/24  Yes Sanjuanita Alexandra MD   lipase-protease-amylase (CREON) 79641-54825 units delayed release capsule Take 3 capsules by mouth 3 times daily   Yes ProviderSandra MD   loperamide (IMODIUM) 2 MG capsule Take 1-2 capsules by mouth as needed for Diarrhea   Yes Sandra Brennan MD  cooperative.  HEENT: Normal cephalic, atraumatic without obvious deformity. Pupils equal, round, and reactive to light.   Neck: Supple, with full range of motion. No jugular venous distention. Trachea midline.  Respiratory:  Normal respiratory effort. Clear to auscultation, bilaterally without Rales/Wheezes/Rhonchi.  Cardiovascular: Regular rate and rhythm without murmurs, rubs or gallops.  Abdomen: Soft, Tender throughout entire abdomen, non-distended with active bowel sounds.  Musculoskeletal: No clubbing, cyanosis or edema bilaterally.  Skin: Pink, warm, dry. No rashes or lesions.  Psychiatric: Alert and oriented, thought content appropriate, normal insight    Labs:   Recent Labs     10/04/24  0750   WBC 3.1*   HGB 11.1*   HCT 36.7*        No results for input(s): \"NA\", \"K\", \"CL\", \"CO2\", \"BUN\", \"CREATININE\", \"CALCIUM\", \"PHOS\" in the last 72 hours.    Invalid input(s): \"MAGNES\"  No results for input(s): \"AST\", \"ALT\", \"BILIDIR\", \"BILITOT\", \"ALKPHOS\" in the last 72 hours.  Recent Labs     10/04/24  0750   INR 1.01       Radiology:   CT A/P OSH CT:  diffusely atrophic pancreas, mild heterogeneity around the pancreatic head and uncinate process, possible acute pancreatitis.   Code Status: Full Code    ASSESSMENT:  Acute on chronic Pancreatitis, chronic- on Creon  Abdominal pain  Chronic Diarrhea  Nausea and vomiting  History of adrenal insufficiency  History of dysautonomia  History of H. Pylori Gastritis- treated with triple abx therapy. On Pantoprazole BID  Non-compliance    PLAN:    Increase IVF to LR at 250ml/hr, can titrate down as pain and nausea improve  Pain control per primary  Anti-emetics per primary  NPO until pain and nausea have resolved  Continue Protonix IV BID  Continue Zenpep with meals and snack  Peth test send out ordered  Genetic autoimmune markers ordered for sendout  Records from prior GI physician requested in [place], have not been received yet.  GI will follow       Case reviewed and

## 2024-10-04 NOTE — PROGRESS NOTES
Physician Progress Note      PATIENT:               RAFFI BASS  CSN #:                  [id number]  :                       1990  ADMIT DATE:       10/3/2024 6:10 PM  DISCH DATE:  RESPONDING  PROVIDER #:        Ashita Behl MD          QUERY TEXT:    Patient admitted with acute on chronic pancreatitis and noted to have WBC 3.1,   HR up to 111, and RR up to 22. If possible, please document in progress notes   and discharge summary if you are evaluating and/or treating any of the   following:    The medical record reflects the following:  Risk Factors: acute on chronic pancreatitis  Clinical Indicators: presented with acute on chronic pancreatitis and on   arrival WBC 3.1, temp 98.3, , RR 22  Treatment: Labs, imaging, monitoring, IVF, Morphine  Options provided:  -- SIRS of non-infectious origin due to acute on chronic pancreatitis without   acute organ dysfunction  -- Other - I will add my own diagnosis  -- Disagree - Not applicable / Not valid  -- Disagree - Clinically unable to determine / Unknown  -- Refer to Clinical Documentation Reviewer    PROVIDER RESPONSE TEXT:    This patient has SIRS of non-infectious origin due to acute on chronic   pancreatitis without acute organ dysfunction.    Query created by: Lindsey Saucedo on 10/4/2024 8:39 AM      Electronically signed by:  Ashita Behl MD 10/4/2024 12:28 PM

## 2024-10-05 ENCOUNTER — APPOINTMENT (OUTPATIENT)
Dept: CT IMAGING | Age: 34
End: 2024-10-05
Attending: INTERNAL MEDICINE
Payer: OTHER GOVERNMENT

## 2024-10-05 PROBLEM — M25.512 ACUTE PAIN OF LEFT SHOULDER: Status: ACTIVE | Noted: 2024-10-05

## 2024-10-05 PROBLEM — R13.10 DYSPHAGIA: Status: ACTIVE | Noted: 2024-10-05

## 2024-10-05 PROBLEM — D72.819 LEUKOPENIA: Status: ACTIVE | Noted: 2024-10-05

## 2024-10-05 PROBLEM — G90.1 DYSAUTONOMIA (HCC): Status: ACTIVE | Noted: 2024-10-05

## 2024-10-05 LAB
ALBUMIN SERPL BCG-MCNC: 3.7 G/DL (ref 3.5–5.1)
ALP SERPL-CCNC: 87 U/L (ref 38–126)
ALT SERPL W/O P-5'-P-CCNC: 40 U/L (ref 11–66)
ANION GAP SERPL CALC-SCNC: 15 MEQ/L (ref 8–16)
AST SERPL-CCNC: 47 U/L (ref 5–40)
BASOPHILS ABSOLUTE: 0 THOU/MM3 (ref 0–0.1)
BASOPHILS NFR BLD AUTO: 0.4 %
BILIRUB SERPL-MCNC: 0.6 MG/DL (ref 0.3–1.2)
BUN SERPL-MCNC: 3 MG/DL (ref 7–22)
CALCIUM SERPL-MCNC: 8.9 MG/DL (ref 8.5–10.5)
CHLORIDE SERPL-SCNC: 101 MEQ/L (ref 98–111)
CO2 SERPL-SCNC: 21 MEQ/L (ref 23–33)
CREAT SERPL-MCNC: 0.7 MG/DL (ref 0.4–1.2)
DEPRECATED RDW RBC AUTO: 47.2 FL (ref 35–45)
EOSINOPHIL NFR BLD AUTO: 3.5 %
EOSINOPHILS ABSOLUTE: 0.1 THOU/MM3 (ref 0–0.4)
ERYTHROCYTE [DISTWIDTH] IN BLOOD BY AUTOMATED COUNT: 14.7 % (ref 11.5–14.5)
GFR SERPL CREATININE-BSD FRML MDRD: > 90 ML/MIN/1.73M2
GLUCOSE BLD STRIP.AUTO-MCNC: 77 MG/DL (ref 70–108)
GLUCOSE SERPL-MCNC: 66 MG/DL (ref 70–108)
HCT VFR BLD AUTO: 39 % (ref 37–47)
HGB BLD-MCNC: 11.9 GM/DL (ref 12–16)
IMM GRANULOCYTES # BLD AUTO: 0 THOU/MM3 (ref 0–0.07)
IMM GRANULOCYTES NFR BLD AUTO: 0 %
LYMPHOCYTES ABSOLUTE: 0.8 THOU/MM3 (ref 1–4.8)
LYMPHOCYTES NFR BLD AUTO: 33.3 %
MCH RBC QN AUTO: 26.7 PG (ref 26–33)
MCHC RBC AUTO-ENTMCNC: 30.5 GM/DL (ref 32.2–35.5)
MCV RBC AUTO: 87.4 FL (ref 81–99)
MONOCYTES ABSOLUTE: 0.3 THOU/MM3 (ref 0.4–1.3)
MONOCYTES NFR BLD AUTO: 11 %
NEUTROPHILS ABSOLUTE: 1.2 THOU/MM3 (ref 1.8–7.7)
NEUTROPHILS NFR BLD AUTO: 51.8 %
NRBC BLD AUTO-RTO: 0 /100 WBC
PLATELET # BLD AUTO: 141 THOU/MM3 (ref 130–400)
PMV BLD AUTO: 11.8 FL (ref 9.4–12.4)
POTASSIUM SERPL-SCNC: 3.5 MEQ/L (ref 3.5–5.2)
PROT SERPL-MCNC: 6.7 G/DL (ref 6.1–8)
RBC # BLD AUTO: 4.46 MILL/MM3 (ref 4.2–5.4)
SODIUM SERPL-SCNC: 137 MEQ/L (ref 135–145)
WBC # BLD AUTO: 2.3 THOU/MM3 (ref 4.8–10.8)

## 2024-10-05 PROCEDURE — 99233 SBSQ HOSP IP/OBS HIGH 50: CPT | Performed by: STUDENT IN AN ORGANIZED HEALTH CARE EDUCATION/TRAINING PROGRAM

## 2024-10-05 PROCEDURE — 82948 REAGENT STRIP/BLOOD GLUCOSE: CPT

## 2024-10-05 PROCEDURE — 90792 PSYCH DIAG EVAL W/MED SRVCS: CPT | Performed by: PSYCHIATRY & NEUROLOGY

## 2024-10-05 PROCEDURE — 6370000000 HC RX 637 (ALT 250 FOR IP): Performed by: PHYSICIAN ASSISTANT

## 2024-10-05 PROCEDURE — 92610 EVALUATE SWALLOWING FUNCTION: CPT

## 2024-10-05 PROCEDURE — 1200000000 HC SEMI PRIVATE

## 2024-10-05 PROCEDURE — 6360000002 HC RX W HCPCS: Performed by: STUDENT IN AN ORGANIZED HEALTH CARE EDUCATION/TRAINING PROGRAM

## 2024-10-05 PROCEDURE — 6370000000 HC RX 637 (ALT 250 FOR IP): Performed by: NURSE PRACTITIONER

## 2024-10-05 PROCEDURE — 36415 COLL VENOUS BLD VENIPUNCTURE: CPT

## 2024-10-05 PROCEDURE — 2580000003 HC RX 258

## 2024-10-05 PROCEDURE — 80053 COMPREHEN METABOLIC PANEL: CPT

## 2024-10-05 PROCEDURE — 70491 CT SOFT TISSUE NECK W/DYE: CPT

## 2024-10-05 PROCEDURE — 6360000004 HC RX CONTRAST MEDICATION: Performed by: STUDENT IN AN ORGANIZED HEALTH CARE EDUCATION/TRAINING PROGRAM

## 2024-10-05 PROCEDURE — 6360000002 HC RX W HCPCS: Performed by: PHYSICIAN ASSISTANT

## 2024-10-05 PROCEDURE — 6360000002 HC RX W HCPCS

## 2024-10-05 PROCEDURE — 6370000000 HC RX 637 (ALT 250 FOR IP): Performed by: STUDENT IN AN ORGANIZED HEALTH CARE EDUCATION/TRAINING PROGRAM

## 2024-10-05 PROCEDURE — 85025 COMPLETE CBC W/AUTO DIFF WBC: CPT

## 2024-10-05 RX ORDER — MORPHINE SULFATE 2 MG/ML
2 INJECTION, SOLUTION INTRAMUSCULAR; INTRAVENOUS EVERY 4 HOURS PRN
Status: DISCONTINUED | OUTPATIENT
Start: 2024-10-05 | End: 2024-10-06

## 2024-10-05 RX ORDER — MORPHINE SULFATE 4 MG/ML
4 INJECTION, SOLUTION INTRAMUSCULAR; INTRAVENOUS EVERY 4 HOURS PRN
Status: DISCONTINUED | OUTPATIENT
Start: 2024-10-05 | End: 2024-10-06

## 2024-10-05 RX ORDER — LORAZEPAM 2 MG/ML
1 INJECTION INTRAMUSCULAR ONCE
Status: COMPLETED | OUTPATIENT
Start: 2024-10-05 | End: 2024-10-05

## 2024-10-05 RX ORDER — ACETAMINOPHEN 325 MG/1
650 TABLET ORAL EVERY 6 HOURS SCHEDULED
Status: DISCONTINUED | OUTPATIENT
Start: 2024-10-05 | End: 2024-10-11 | Stop reason: HOSPADM

## 2024-10-05 RX ORDER — BUSPIRONE HYDROCHLORIDE 10 MG/1
10 TABLET ORAL 3 TIMES DAILY
Status: DISCONTINUED | OUTPATIENT
Start: 2024-10-05 | End: 2024-10-11 | Stop reason: HOSPADM

## 2024-10-05 RX ORDER — HYDROCODONE BITARTRATE AND ACETAMINOPHEN 5; 325 MG/1; MG/1
2 TABLET ORAL EVERY 4 HOURS PRN
Status: DISCONTINUED | OUTPATIENT
Start: 2024-10-05 | End: 2024-10-05

## 2024-10-05 RX ORDER — HYDROCODONE BITARTRATE AND ACETAMINOPHEN 5; 325 MG/1; MG/1
1 TABLET ORAL EVERY 4 HOURS PRN
Status: DISCONTINUED | OUTPATIENT
Start: 2024-10-05 | End: 2024-10-05

## 2024-10-05 RX ORDER — IOPAMIDOL 755 MG/ML
80 INJECTION, SOLUTION INTRAVASCULAR
Status: COMPLETED | OUTPATIENT
Start: 2024-10-05 | End: 2024-10-05

## 2024-10-05 RX ORDER — LORAZEPAM 2 MG/ML
0.5 INJECTION INTRAMUSCULAR ONCE
Status: COMPLETED | OUTPATIENT
Start: 2024-10-05 | End: 2024-10-05

## 2024-10-05 RX ORDER — DICYCLOMINE HYDROCHLORIDE 10 MG/1
20 CAPSULE ORAL
Status: DISCONTINUED | OUTPATIENT
Start: 2024-10-05 | End: 2024-10-09

## 2024-10-05 RX ORDER — HYDROXYZINE HYDROCHLORIDE 25 MG/1
50 TABLET, FILM COATED ORAL ONCE
Status: COMPLETED | OUTPATIENT
Start: 2024-10-05 | End: 2024-10-05

## 2024-10-05 RX ADMIN — DIPHENHYDRAMINE HYDROCHLORIDE 50 MG: 50 INJECTION INTRAMUSCULAR; INTRAVENOUS at 00:58

## 2024-10-05 RX ADMIN — DIPHENHYDRAMINE HYDROCHLORIDE 50 MG: 50 INJECTION INTRAMUSCULAR; INTRAVENOUS at 17:22

## 2024-10-05 RX ADMIN — MORPHINE SULFATE 4 MG: 4 INJECTION, SOLUTION INTRAMUSCULAR; INTRAVENOUS at 07:52

## 2024-10-05 RX ADMIN — MORPHINE SULFATE 4 MG: 4 INJECTION, SOLUTION INTRAMUSCULAR; INTRAVENOUS at 21:17

## 2024-10-05 RX ADMIN — MORPHINE SULFATE 4 MG: 4 INJECTION, SOLUTION INTRAMUSCULAR; INTRAVENOUS at 05:15

## 2024-10-05 RX ADMIN — MORPHINE SULFATE 4 MG: 4 INJECTION, SOLUTION INTRAMUSCULAR; INTRAVENOUS at 00:56

## 2024-10-05 RX ADMIN — LORAZEPAM 0.5 MG: 2 INJECTION INTRAMUSCULAR; INTRAVENOUS at 08:29

## 2024-10-05 RX ADMIN — BUSPIRONE HYDROCHLORIDE 10 MG: 10 TABLET ORAL at 13:21

## 2024-10-05 RX ADMIN — MORPHINE SULFATE 4 MG: 4 INJECTION, SOLUTION INTRAMUSCULAR; INTRAVENOUS at 17:22

## 2024-10-05 RX ADMIN — LORAZEPAM 1 MG: 2 INJECTION INTRAMUSCULAR; INTRAVENOUS at 20:58

## 2024-10-05 RX ADMIN — ENOXAPARIN SODIUM 40 MG: 100 INJECTION SUBCUTANEOUS at 07:52

## 2024-10-05 RX ADMIN — DIPHENHYDRAMINE HYDROCHLORIDE 50 MG: 50 INJECTION INTRAMUSCULAR; INTRAVENOUS at 21:17

## 2024-10-05 RX ADMIN — ACETAMINOPHEN 650 MG: 325 TABLET ORAL at 12:01

## 2024-10-05 RX ADMIN — BUSPIRONE HYDROCHLORIDE 10 MG: 10 TABLET ORAL at 09:17

## 2024-10-05 RX ADMIN — HYDROXYZINE HYDROCHLORIDE 50 MG: 25 TABLET, FILM COATED ORAL at 21:17

## 2024-10-05 RX ADMIN — DIPHENHYDRAMINE HYDROCHLORIDE 50 MG: 50 INJECTION INTRAMUSCULAR; INTRAVENOUS at 09:17

## 2024-10-05 RX ADMIN — ACETAMINOPHEN 650 MG: 325 TABLET ORAL at 17:26

## 2024-10-05 RX ADMIN — PANTOPRAZOLE SODIUM 40 MG: 40 INJECTION, POWDER, FOR SOLUTION INTRAVENOUS at 07:52

## 2024-10-05 RX ADMIN — IOPAMIDOL 80 ML: 755 INJECTION, SOLUTION INTRAVENOUS at 07:17

## 2024-10-05 RX ADMIN — MORPHINE SULFATE 4 MG: 4 INJECTION, SOLUTION INTRAMUSCULAR; INTRAVENOUS at 12:02

## 2024-10-05 RX ADMIN — SODIUM CHLORIDE, PRESERVATIVE FREE 10 ML: 5 INJECTION INTRAVENOUS at 20:12

## 2024-10-05 RX ADMIN — PANTOPRAZOLE SODIUM 40 MG: 40 INJECTION, POWDER, FOR SOLUTION INTRAVENOUS at 20:12

## 2024-10-05 RX ADMIN — DIPHENHYDRAMINE HYDROCHLORIDE 50 MG: 50 INJECTION INTRAMUSCULAR; INTRAVENOUS at 13:19

## 2024-10-05 RX ADMIN — MORPHINE SULFATE 4 MG: 4 INJECTION, SOLUTION INTRAMUSCULAR; INTRAVENOUS at 10:01

## 2024-10-05 RX ADMIN — HYDROCODONE BITARTRATE AND ACETAMINOPHEN 2 TABLET: 5; 325 TABLET ORAL at 13:24

## 2024-10-05 RX ADMIN — DIPHENHYDRAMINE HYDROCHLORIDE 50 MG: 50 INJECTION INTRAMUSCULAR; INTRAVENOUS at 05:16

## 2024-10-05 RX ADMIN — SODIUM CHLORIDE, PRESERVATIVE FREE 10 ML: 5 INJECTION INTRAVENOUS at 07:58

## 2024-10-05 ASSESSMENT — PAIN DESCRIPTION - DESCRIPTORS
DESCRIPTORS: SHARP
DESCRIPTORS: SHARP
DESCRIPTORS: ACHING
DESCRIPTORS: ACHING
DESCRIPTORS: SHARP
DESCRIPTORS: ACHING;SHARP
DESCRIPTORS: ACHING;SHARP
DESCRIPTORS: ACHING
DESCRIPTORS: ACHING;SHARP
DESCRIPTORS: SHARP

## 2024-10-05 ASSESSMENT — PAIN DESCRIPTION - LOCATION
LOCATION: ABDOMEN;SHOULDER;NECK
LOCATION: ABDOMEN;SHOULDER;NECK
LOCATION: NECK;SHOULDER;ABDOMEN
LOCATION: NECK
LOCATION: ABDOMEN;NECK;SHOULDER
LOCATION: ABDOMEN;NECK;SHOULDER
LOCATION: NECK;ABDOMEN
LOCATION: NECK;ABDOMEN
LOCATION: NECK;SHOULDER;ABDOMEN
LOCATION: ABDOMEN;NECK;SHOULDER

## 2024-10-05 ASSESSMENT — PAIN DESCRIPTION - ORIENTATION
ORIENTATION: LEFT
ORIENTATION: RIGHT;UPPER
ORIENTATION: LEFT
ORIENTATION: LEFT;UPPER
ORIENTATION: LEFT

## 2024-10-05 ASSESSMENT — PAIN SCALES - GENERAL
PAINLEVEL_OUTOF10: 6
PAINLEVEL_OUTOF10: 9
PAINLEVEL_OUTOF10: 10
PAINLEVEL_OUTOF10: 9
PAINLEVEL_OUTOF10: 8
PAINLEVEL_OUTOF10: 0
PAINLEVEL_OUTOF10: 3
PAINLEVEL_OUTOF10: 8
PAINLEVEL_OUTOF10: 9
PAINLEVEL_OUTOF10: 0
PAINLEVEL_OUTOF10: 9
PAINLEVEL_OUTOF10: 3
PAINLEVEL_OUTOF10: 9

## 2024-10-05 ASSESSMENT — PAIN - FUNCTIONAL ASSESSMENT: PAIN_FUNCTIONAL_ASSESSMENT: ACTIVITIES ARE NOT PREVENTED

## 2024-10-05 NOTE — PROGRESS NOTES
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 History of multiple celiac plexus blocks in the past. OSH CT A/P diffusely atrophic pancreas, mild heterogeneity around the pancreatic head and uncinate process, possible acute pancreatitis. OSH: WBC 3.8. Lipase 26. CK 89.   Continue Zenpep (substituted for Creon)  Pain control, cut down on pain medications today.  Protonix 40mg BID  On Bentyl  Off IV fluids, on CLD.  CT interpretation of outside films  GI following, appreciate recommendations, plan to transfer to [facility]  on Monday, spoke to them today, the pancreatic specialist who can help with nerve block for patient's pain will not be available till Monday.  Plan to initiate transfer then.  Nutrition consult per patient request     Left shoulder fluid collection, shoulder pain: Underwent extensive workup previous admission, see discharge summary dated on 9/18/2024, was believed to be myositis.  ID and Ortho following.  MRI unchanged from previously, shows edema/inflammation in left trapezius muscle and left teres minor muscle but no abscess/fluid collection.   Sed rate and CRP negative.  Hold off on antibiotics for now per ID.  CK of 60.  Procalcitonin of 0.04.    Trouble swallowing: Reports history of cervical spine abscess.  MRI of cervical spine negative for to space infection.  CT soft tissue neck showed mild enlargement of adenoids.  SLP eval    Leukopenia: WBC 3.8. Likely reactive.  Appears chronic.  Will monitor     Dysautonomia: History of labile blood pressures.   Will monitor     ?Pain seeking behavior: Upon patient arrival she demanded pain medications and Dilaudid was  10/05/2024    CREATININE 0.7 10/05/2024    BUN 3 (L) 10/05/2024    CO2 21 (L) 10/05/2024    INR 1.01 10/04/2024        Microbiology:    Blood culture #1:   Lab Results   Component Value Date/Time    BC No growth 24 hours. 10/04/2024 01:10 PM    BC No growth 24 hours. 10/04/2024 01:10 PM       Blood culture #2:No results found for: \"BLOODCULT2\"    Organism:No results found for: \"ORG\"    No results found for: \"LABGRAM\"    MRSA culture only:No results found for: \"MRSAC\"    Urine culture: No results found for: \"LABURIN\"    Respiratory culture: No results found for: \"CULTRESP\"    Aerobic and Anaerobic :  No results found for: \"LABAERO\"  No results found for: \"LABANAE\"    Urinalysis:      Lab Results   Component Value Date/Time    NITRU NEGATIVE 09/18/2024 05:00 PM    BLOODU NEGATIVE 09/18/2024 05:00 PM    GLUCOSEU NEGATIVE 09/18/2024 05:00 PM       Radiology:  CT COMPARISON OF OUTSIDE FILMS   Final Result      CT COMPARISON OF OUTSIDE FILMS   Final Result      CT COMPARISON OF OUTSIDE FILMS   Final Result      CT COMPARISON OF OUTSIDE FILMS   Final Result      CT COMPARISON OF OUTSIDE FILMS   Final Result      CT SOFT TISSUE NECK W CONTRAST   Final Result   1. Increased soft tissue density in the left supraclavicular region consistent   with edema and/or cellulitis.   2. Mild enlargement of the adenoids.   3. Otherwise negative CT scan of the neck.               **This report has been created using voice recognition software. It may contain   minor errors which are inherent in voice recognition technology.**      Electronically signed by Dr. Aura Spencer      MRI SHOULDER LEFT W WO CONTRAST   Final Result   1. Subcutaneous edema and inflammation about the left trapezius muscle. There is   enlargement and edema of the left trapezius muscle . Mild edema is seen in the   left teres minor muscle with surrounding subcutaneous edema. No localized fluid   collection is seen to reflect the presence of an abscess.

## 2024-10-05 NOTE — PROGRESS NOTES
Gastroenterology Progress Note:     Patient Name:  Dixon Conn   MRN: [id number]  [id number]  YOB: 1990  Admit Date: 10/3/2024  6:10 PM  Primary Care Physician: Schoen, Ashley, PA   6K-27/027-A     Patient seen and examined.  24 hours events and chart reviewed.    Subjective: Upset, crying   States pain is not controlled, in \"pancreas and neck  Psych saw  Discussed with ID earlier    Objective:  /65   Pulse (!) 101   Temp 98 °F (36.7 °C) (Oral)   Resp 18   Ht 1.702 m (5' 7.01\")   Wt 62.4 kg (137 lb 9.1 oz)   LMP 09/04/2024 (Within Days)   SpO2 97%   BMI 21.54 kg/m²     Review of Systems - Gastrointestinal ROS: positive for - abdominal pain, appetite loss, diarrhea, and nausea/vomiting     Physical Exam:    General:  Nourished, emotional  HEENT: Atraumatic, normocephalic. Moist oral mucous membranes.  Neck: Supple without adenopathy, JVD, thyromegaly or masses. Trachea midline.  CV: Heart RRR, no murmurs, rubs, gallops.  Resp: Even, easy without cough or accessory use. Lungs clear to ascultation bilaterally.   Abd: Round, soft, generally tender. No hepatosplenomegaly or mass present. Active bowel sounds heard. No distention noted.   Ext:  Without cyanosis, clubbing, edema.   Skin: Pink, warm, dry  Neuro:  Alert, oriented x 3 with no obvious deficits.       Rectal: deferred    Labs:   CBC:   Lab Results   Component Value Date/Time    WBC 3.1 10/04/2024 07:50 AM    HGB 11.1 10/04/2024 07:50 AM    HCT 36.7 10/04/2024 07:50 AM    MCV 89.3 10/04/2024 07:50 AM     10/04/2024 07:50 AM     BMP:   Lab Results   Component Value Date/Time     10/04/2024 07:50 AM    K 3.5 10/04/2024 07:50 AM     10/04/2024 07:50 AM    CO2 24 10/04/2024 07:50 AM    BUN 6 10/04/2024 07:50 AM    CREATININE 0.7 10/04/2024 07:50 AM    CALCIUM 8.7 10/04/2024 07:50 AM     PT/INR:   Lab Results   Component Value Date/Time    INR 1.01 10/04/2024 07:50 AM     Lipids:   Lab Results   Component Value  Date/Time    ALKPHOS 80 10/04/2024 07:50 AM    ALT 34 10/04/2024 07:50 AM    AST 36 10/04/2024 07:50 AM    BILITOT 0.3 10/04/2024 07:50 AM    LIPASE 18.9 10/04/2024 07:50 AM     Significant Diagnostic Studies: None reviewed today    Current Meds:  Scheduled Meds:   busPIRone  10 mg Oral TID    pantoprazole  40 mg IntraVENous BID    lipase-protease-amylase  40,000 Units Oral TID WC    acetaminophen  650 mg Oral 4 times per day    enoxaparin  40 mg SubCUTAneous Daily    lipase-protease-amylas  15,000 Units Oral TID WC    sodium chloride flush  5-40 mL IntraVENous 2 times per day     Continuous Infusions:   sodium chloride         Assessment:  34 y.o. female with PMH of pancreatitis, adrenal cortical insufficiency, pituitary tumor, avascular necrosis, ulcerative colitis, and kidney stones who we are asked to see/evaluate by Behl, Ashita, MD for medical management of acute on chronic pancreatitis. She has a history of autoimmune pancreatitis and was previously seen in [place]. She has had several celiac plexus blocks in the past. Patient presented to [facility] 2 days after recent discharge with reports of abdominal pain radiating to back, N/V and Diarrhea. She stated she went out to eat with some friends for her birthday, did not have pancreatic enzymes with her and ate sushi, and thinks this may have caused another flare up. She has been seen at  in the past and established care. She takes Creon for chronic pancreatitis/diarrhea management. She had a celiac plexus block completed on 9/12/24 with Dr. Vergara. Denies CP, SOB, hematochezia or hematemesis. CT A/P done at outside facility demonstrated diffusely atrophic pancreas, mild heterogeneity around the pancreatic head and uncinate process, possible acute pancreatitis. Lipase 18.9     TODAY:   She reports concern with pain med changes, states only IV benadryl works for her nausea. Tearful. Discussed that we have provided what we can here from a GI standpoint.

## 2024-10-05 NOTE — CONSULTS
Department of Psychiatry   Psychiatric Assessment      Thank you very much for allowing us to participate in the care of this patient.      Reason for Consult:  anxiety    HISTORY OF PRESENT ILLNESS:    Patient is a 34-year-old female with history of anxiety and past medical history of chronic pancreatitis and sought anomia presented for complaint of headache and abdominal pain.  Also reporting dealing with pain over her shoulder.  Psychiatry is consulted to evaluate for anxiety.  Patient initially was dismissive and irritable stating her anxiety is largely tied into her pain.  Patient initially refused seeing any psychiatrist in the past but during the interview she did mention trying several psychotropic medications.  She claims that she cannot take any oral medications and mentions that she has side effects to every single SSRI and SNRI.  Was leading conversation towards Ativan multiple times during the interview.  Was somewhat tearful discussing that only medication that helps her is Ativan along with her pain medication.  Strong concerns noted for med seeking behaviors during the interview.  Discussed with her that we have concerns about mixing opiates and benzodiazepines and we would recommend looking into alternate treatment options.  However patient was not interested in any treatment alternate options that were provided for her.  Denies any other significant mood issues.  Denies any suicidal ideation plan or intent.  Could not elicit any manic or hypomanic symptoms.  Could not elicit any psychotic symptoms.    PSYCHIATRIC HISTORY:  Patient reports that she has never seen a psychiatrist but did try multiple medications in the past.  Denies any previous suicide attempts or inpatient psychiatric hospitalizations    Lifetime Psychiatric Review of Systems         Obsessions and Compulsions: Denies       Amairani or Hypomania: Denies     Hallucinations: Denies     Panic Attacks:  Denies     Delusions:  Denies

## 2024-10-05 NOTE — PROGRESS NOTES
Ortho Trauma    Awaiting MRI results of chest, neck and shoulder      Patient seen and anxious with left sided neck pain, now with some paresthesias into hand    Wishing for aspiration..    Need results of advanced imaging before knowing what to aspirate    Will continue to follow

## 2024-10-05 NOTE — PLAN OF CARE
Problem: Discharge Planning  Goal: Discharge to home or other facility with appropriate resources  10/5/2024 0000 by Silva Mccarthy RN  Outcome: Progressing     Problem: Risk for Elopement  Goal: Patient will not exit the unit/facility without proper excort  10/5/2024 0000 by Silav Mccarthy RN  Outcome: Progressing    Problem: Pain  Goal: Verbalizes/displays adequate comfort level or baseline comfort level  10/5/2024 0000 by Silva Mccarthy RN  Outcome: Progressing

## 2024-10-05 NOTE — PROGRESS NOTES
[facility]  SPEECH THERAPY  STRZ RENAL TELEMETRY 6K  Clinical Swallow Evaluation    Discharge Recommendations: Continue to Assess Pending Progress  DIET ORDER RECOMMENDATIONS AFTER EVALUATION: NPO; advance as tolerated following medical management on suspected L shoulder abscess   Strategies:   N/A       SLP Individual Minutes  Time In: 0759  Time Out: 0807  Minutes: 8  Timed Code Treatment Minutes: 0 Minutes       Date: 10/5/2024  Patient Name: Dixon Conn      CSN: [id number]   : 1990  (34 y.o.)  Gender: female   Referring Physician:  Behl, Ashita, MD     Diagnosis: Pancreatitis, unspecified pancreatitis type    History of Present Illness/Injury: Patient admit to [facility] with above medical dx. Please refer to physician H&P for full patient medical history. \"Dixon Conn is a 34 y.o. female with PMHx of Chronic pancreatitis, Dysautonomia who presented to [facility] with chief complaint of headache and abdominal pain. Patient arrived as a direct admission from [facility]. Patient reports that she felt well for about 2 days after discharge (on ). States she went out to eat with some friends for her birthday and ate some sushi and thinks it may have caused another flare up. Reports epigastric pain radiating into her back. Reports associated nausea, vomiting and diarrhea. Reports a headache currently. Also states she is very anxious and itchy. Otherwise denies symptoms. Denies dizziness, or lightheadedness. Denies chest pain or SOB. Denies dysuria or hematuria.\"     No past medical history on file.    SUBJECTIVE:  Session approved by Carmen RAMIREZ. Patient seen upright in bed. Morphine given for pain in L shoulder (concerns for abscess by medical team). Fully alert and cooperative. C/o 10/10 pain with consumption of intake s/t suspected impact on likely L shoulder abscess while swallowing.     OBJECTIVE:    Pain:  10/10 - Pain location: L shoulder mass (suspected abscess from  indicated at this time.  Rehabilitation Potential: good    EDUCATION:  Learner: Patient  Education:  Reviewed results and recommendations of this evaluation  Evaluation of Education: Verbalizes understanding and Family not present    PLAN:  No further speech therapy services indicated.    PATIENT GOAL:    Return to least restrictive diet.    Waleska Blevins M.S. CCC-SLP [id number] 10/5/2024

## 2024-10-05 NOTE — PLAN OF CARE
Problem: Discharge Planning  Goal: Discharge to home or other facility with appropriate resources  10/5/2024 1133 by Carmen Valles RN  Outcome: Progressing  Flowsheets (Taken 10/5/2024 0750)  Discharge to home or other facility with appropriate resources: Identify barriers to discharge with patient and caregiver  10/5/2024 0000 by Silva Mccarthy RN  Outcome: Progressing     Problem: Risk for Elopement  Goal: Patient will not exit the unit/facility without proper excort  10/5/2024 1133 by Carmen Valles RN  Outcome: Progressing  Flowsheets (Taken 10/5/2024 0750)  Nursing Interventions for Elopement Risk: Assist with personal care needs such as toileting, eating, dressing, as needed to reduce the risk of wandering  10/5/2024 0000 by Silva Mccarthy RN  Outcome: Progressing  Flowsheets (Taken 10/4/2024 2006)  Nursing Interventions for Elopement Risk: Assist with personal care needs such as toileting, eating, dressing, as needed to reduce the risk of wandering     Problem: Pain  Goal: Verbalizes/displays adequate comfort level or baseline comfort level  10/5/2024 1133 by Carmen Valles RN  Outcome: Progressing  Flowsheets (Taken 10/4/2024 1041)  Verbalizes/displays adequate comfort level or baseline comfort level: Encourage patient to monitor pain and request assistance  10/5/2024 0000 by Silva Mccarthy RN  Outcome: Progressing   Patient aware and updated on plan of care

## 2024-10-06 LAB
ACB COMPLEX DNA BLD POS QL NAA+NON-PROBE: NOT DETECTED
ALBUMIN SERPL BCG-MCNC: 3.4 G/DL (ref 3.5–5.1)
ALP SERPL-CCNC: 85 U/L (ref 38–126)
ALT SERPL W/O P-5'-P-CCNC: 37 U/L (ref 11–66)
ANION GAP SERPL CALC-SCNC: 14 MEQ/L (ref 8–16)
AST SERPL-CCNC: 42 U/L (ref 5–40)
B FRAGILIS DNA BLD POS QL NAA+NON-PROBE: NOT DETECTED
BASOPHILS ABSOLUTE: 0 THOU/MM3 (ref 0–0.1)
BASOPHILS NFR BLD AUTO: 0.6 %
BILIRUB SERPL-MCNC: 0.4 MG/DL (ref 0.3–1.2)
BLACTX-M ISLT/SPM QL: NORMAL
BLAIMP ISLT/SPM QL: NORMAL
BLAKPC ISLT/SPM QL: NORMAL
BLAOXA-48-LIKE ISLT/SPM QL: NORMAL
BLAVIM ISLT/SPM QL: NORMAL
BOTTLE TYPE: NORMAL
BUN SERPL-MCNC: 5 MG/DL (ref 7–22)
BURR CELLS BLD QL SMEAR: ABNORMAL
C ALBICANS DNA BLD POS QL NAA+NON-PROBE: NOT DETECTED
C AURIS DNA BLD POS QL NAA+NON-PROBE: NOT DETECTED
C CAYETANENSIS DNA SPEC QL NAA+PROBE: NOT DETECTED
C GATTII+NEOFOR DNA BLD POS QL NAA+N-PRB: NOT DETECTED
C GLABRATA DNA BLD POS QL NAA+NON-PROBE: NOT DETECTED
C KRUSEI DNA BLD POS QL NAA+NON-PROBE: NOT DETECTED
C PARAP DNA BLD POS QL NAA+NON-PROBE: NOT DETECTED
C TROPICLS DNA BLD POS QL NAA+NON-PROBE: NOT DETECTED
CALCIUM SERPL-MCNC: 8.7 MG/DL (ref 8.5–10.5)
CAMPY SP DNA.DIARRHEA STL QL NAA+PROBE: NOT DETECTED
CHLORIDE SERPL-SCNC: 103 MEQ/L (ref 98–111)
CK SERPL-CCNC: 177 U/L (ref 30–135)
CO2 SERPL-SCNC: 22 MEQ/L (ref 23–33)
COAG NEG STAPH DNA BLD QL NAA+PROBE: NOT DETECTED
COLISTIN RES MCR-1 ISLT/SPM QL: NORMAL
CREAT SERPL-MCNC: 0.5 MG/DL (ref 0.4–1.2)
CRP SERPL-MCNC: < 0.3 MG/DL (ref 0–1)
CRYPTOSP DNA SPEC QL NAA+PROBE: NOT DETECTED
DEPRECATED RDW RBC AUTO: 46.7 FL (ref 35–45)
E CLOAC COMP DNA BLD POS NAA+NON-PROBE: NOT DETECTED
E COLI DNA BLD POS QL NAA+NON-PROBE: NOT DETECTED
E COLI O157H7 DNA SPEC QL NAA+PROBE: NORMAL
E FAECALIS DNA BLD POS QL NAA+NON-PROBE: NOT DETECTED
E FAECIUM DNA BLD POS QL NAA+NON-PROBE: NOT DETECTED
E HISTOLYT DNA SPEC QL NAA+PROBE: NOT DETECTED
EAEC PAA PLAS AGGR+AATA ST NAA+NON-PRB: NOT DETECTED
EC STX1+STX2 + H7 FLIC SPEC NAA+PROBE: NOT DETECTED
EKG ATRIAL RATE: 79 BPM
EKG P AXIS: 31 DEGREES
EKG P-R INTERVAL: 138 MS
EKG Q-T INTERVAL: 402 MS
EKG QRS DURATION: 96 MS
EKG QTC CALCULATION (BAZETT): 460 MS
EKG R AXIS: 69 DEGREES
EKG T AXIS: 61 DEGREES
EKG VENTRICULAR RATE: 79 BPM
ELLIPTOCYTES: ABNORMAL
ENTEROBACTERALES DNA BLD POS NAA+N-PRB: NOT DETECTED
EOSINOPHIL NFR BLD AUTO: 6.9 %
EOSINOPHILS ABSOLUTE: 0.1 THOU/MM3 (ref 0–0.4)
EPEC EAE GENE STL QL NAA+NON-PROBE: NOT DETECTED
ERYTHROCYTE [DISTWIDTH] IN BLOOD BY AUTOMATED COUNT: 14.6 % (ref 11.5–14.5)
ERYTHROCYTE [SEDIMENTATION RATE] IN BLOOD BY WESTERGREN METHOD: 23 MM/HR (ref 0–20)
ETEC LTA+ST1A+ST1B TOX ST NAA+NON-PROBE: NOT DETECTED
G LAMBLIA DNA SPEC QL NAA+PROBE: NOT DETECTED
GFR SERPL CREATININE-BSD FRML MDRD: > 90 ML/MIN/1.73M2
GLUCOSE SERPL-MCNC: 73 MG/DL (ref 70–108)
GP B STREP DNA SPEC QL NAA+PROBE: NOT DETECTED
GP B STREP DNA SPEC QL NAA+PROBE: NOT DETECTED
HADV DNA SPEC QL NAA+PROBE: NOT DETECTED
HAEM INFLU DNA BLD POS QL NAA+NON-PROBE: NOT DETECTED
HASTV RNA SPEC QL NAA+PROBE: NOT DETECTED
HCT VFR BLD AUTO: 36 % (ref 37–47)
HGB BLD-MCNC: 11 GM/DL (ref 12–16)
IMM GRANULOCYTES # BLD AUTO: 0 THOU/MM3 (ref 0–0.07)
IMM GRANULOCYTES NFR BLD AUTO: 0 %
K OXYTOCA DNA BLD POS QL NAA+NON-PROBE: NOT DETECTED
K OXYTOCA DNA BLD POS QL NAA+NON-PROBE: NOT DETECTED
KLEBSIELLA SP DNA BLD POS QL NAA+NON-PRB: NOT DETECTED
L MONOCYTOG DNA BLD POS QL NAA+NON-PROBE: NOT DETECTED
LABORATORY REPORT: NORMAL
LDH SERPL L TO P-CCNC: 247 U/L (ref 100–190)
LYMPHOCYTES ABSOLUTE: 0.9 THOU/MM3 (ref 1–4.8)
LYMPHOCYTES NFR BLD AUTO: 50.6 %
MCH RBC QN AUTO: 26.6 PG (ref 26–33)
MCHC RBC AUTO-ENTMCNC: 30.6 GM/DL (ref 32.2–35.5)
MCV RBC AUTO: 87.2 FL (ref 81–99)
MECA ISLT/SPM QL: NORMAL
MECA+MECC+MREJ ISLT/SPM QL: NORMAL
MONOCYTES ABSOLUTE: 0.3 THOU/MM3 (ref 0.4–1.3)
MONOCYTES NFR BLD AUTO: 16.1 %
N MEN DNA BLD POS QL NAA+NON-PROBE: NOT DETECTED
NDM: NORMAL
NEUTROPHILS ABSOLUTE: 0.4 THOU/MM3 (ref 1.8–7.7)
NEUTROPHILS NFR BLD AUTO: 25.8 %
NOROVIRUS GI + GII RNA STL NAA+PROBE: NOT DETECTED
NRBC BLD AUTO-RTO: 0 /100 WBC
P AERUGINOSA DNA BLD POS NAA+NON-PROBE: NOT DETECTED
P SHIGELLOIDES DNA STL QL NAA+PROBE: NOT DETECTED
PATHOLOGIST REVIEW: ABNORMAL
PETH INTERPRETATION: NORMAL
PLATELET # BLD AUTO: 142 THOU/MM3 (ref 130–400)
PLATELET BLD QL SMEAR: ADEQUATE
PLPETH BLD-MCNC: < 10 NG/ML
PMV BLD AUTO: 12 FL (ref 9.4–12.4)
POPETH BLD-MCNC: < 10 NG/ML
POTASSIUM SERPL-SCNC: 3.9 MEQ/L (ref 3.5–5.2)
PROT SERPL-MCNC: 6.2 G/DL (ref 6.1–8)
PROTEUS SPP: NOT DETECTED
RBC # BLD AUTO: 4.13 MILL/MM3 (ref 4.2–5.4)
RV RNA SPEC QL NAA+PROBE: NOT DETECTED
S AUREUS DNA BLD POS QL NAA+NON-PROBE: NOT DETECTED
S EPIDERMIDIS DNA BLD POS QL NAA+NON-PRB: NOT DETECTED
S LUGDUNENSIS DNA BLD POS QL NAA+NON-PRB: NOT DETECTED
S MALTOPHILIA DNA BLD POS QL NAA+NON-PRB: NOT DETECTED
S MARCESCENS DNA BLD POS NAA+NON-PROBE: NOT DETECTED
S PYO DNA THROAT QL NAA+PROBE: NOT DETECTED
SALMONELLA DNA BLD POS QL NAA+NON-PROBE: NOT DETECTED
SALMONELLA DNA SPEC QL NAA+PROBE: NOT DETECTED
SAPOVIRUS RNA SPEC QL NAA+PROBE: NOT DETECTED
SCAN OF BLOOD SMEAR: NORMAL
SCHISTOCYTES BLD QL SMEAR: ABNORMAL
SHIGELLA SP+EIEC IPAH ST NAA+NON-PROBE: NOT DETECTED
SODIUM SERPL-SCNC: 139 MEQ/L (ref 135–145)
SOURCE OF BLOOD CULTURE: NORMAL
STREPTOCOCCUS DNA BLD QL NAA+PROBE: NOT DETECTED
TROPONIN, HIGH SENSITIVITY: 6 NG/L (ref 0–12)
TROPONIN, HIGH SENSITIVITY: < 6 NG/L (ref 0–12)
TROPONIN, HIGH SENSITIVITY: < 6 NG/L (ref 0–12)
V CHOLERAE DNA SPEC QL NAA+PROBE: NOT DETECTED
VANA+VANB ISLT/SPM QL: NORMAL
VARIANT LYMPHS BLD QL SMEAR: ABNORMAL %
VIBRIO DNA SPEC NAA+PROBE: NOT DETECTED
WBC # BLD AUTO: 1.7 THOU/MM3 (ref 4.8–10.8)
Y ENTERO RECN STL QL NAA+PROBE: NOT DETECTED

## 2024-10-06 PROCEDURE — 82550 ASSAY OF CK (CPK): CPT

## 2024-10-06 PROCEDURE — 6360000002 HC RX W HCPCS

## 2024-10-06 PROCEDURE — 93005 ELECTROCARDIOGRAM TRACING: CPT | Performed by: STUDENT IN AN ORGANIZED HEALTH CARE EDUCATION/TRAINING PROGRAM

## 2024-10-06 PROCEDURE — 84484 ASSAY OF TROPONIN QUANT: CPT

## 2024-10-06 PROCEDURE — 6370000000 HC RX 637 (ALT 250 FOR IP): Performed by: STUDENT IN AN ORGANIZED HEALTH CARE EDUCATION/TRAINING PROGRAM

## 2024-10-06 PROCEDURE — 6360000002 HC RX W HCPCS: Performed by: STUDENT IN AN ORGANIZED HEALTH CARE EDUCATION/TRAINING PROGRAM

## 2024-10-06 PROCEDURE — 80053 COMPREHEN METABOLIC PANEL: CPT

## 2024-10-06 PROCEDURE — 99233 SBSQ HOSP IP/OBS HIGH 50: CPT | Performed by: STUDENT IN AN ORGANIZED HEALTH CARE EDUCATION/TRAINING PROGRAM

## 2024-10-06 PROCEDURE — 85651 RBC SED RATE NONAUTOMATED: CPT

## 2024-10-06 PROCEDURE — 6370000000 HC RX 637 (ALT 250 FOR IP): Performed by: NURSE PRACTITIONER

## 2024-10-06 PROCEDURE — 36415 COLL VENOUS BLD VENIPUNCTURE: CPT

## 2024-10-06 PROCEDURE — 2580000003 HC RX 258

## 2024-10-06 PROCEDURE — 86140 C-REACTIVE PROTEIN: CPT

## 2024-10-06 PROCEDURE — 83615 LACTATE (LD) (LDH) ENZYME: CPT

## 2024-10-06 PROCEDURE — 87507 IADNA-DNA/RNA PROBE TQ 12-25: CPT

## 2024-10-06 PROCEDURE — 85025 COMPLETE CBC W/AUTO DIFF WBC: CPT

## 2024-10-06 PROCEDURE — 6370000000 HC RX 637 (ALT 250 FOR IP): Performed by: INTERNAL MEDICINE

## 2024-10-06 PROCEDURE — 93010 ELECTROCARDIOGRAM REPORT: CPT | Performed by: INTERNAL MEDICINE

## 2024-10-06 PROCEDURE — 1200000000 HC SEMI PRIVATE

## 2024-10-06 RX ORDER — MORPHINE SULFATE 2 MG/ML
2 INJECTION, SOLUTION INTRAMUSCULAR; INTRAVENOUS EVERY 4 HOURS PRN
Status: DISCONTINUED | OUTPATIENT
Start: 2024-10-06 | End: 2024-10-11 | Stop reason: HOSPADM

## 2024-10-06 RX ORDER — DIPHENHYDRAMINE HYDROCHLORIDE 50 MG/ML
50 INJECTION INTRAMUSCULAR; INTRAVENOUS EVERY 4 HOURS PRN
Status: COMPLETED | OUTPATIENT
Start: 2024-10-06 | End: 2024-10-06

## 2024-10-06 RX ORDER — SULFAMETHOXAZOLE/TRIMETHOPRIM 800-160 MG
1 TABLET ORAL EVERY 12 HOURS SCHEDULED
Status: COMPLETED | OUTPATIENT
Start: 2024-10-06 | End: 2024-10-10

## 2024-10-06 RX ORDER — PROCHLORPERAZINE EDISYLATE 5 MG/ML
10 INJECTION INTRAMUSCULAR; INTRAVENOUS EVERY 6 HOURS PRN
Status: DISCONTINUED | OUTPATIENT
Start: 2024-10-06 | End: 2024-10-11 | Stop reason: HOSPADM

## 2024-10-06 RX ORDER — HYDROXYZINE HYDROCHLORIDE 50 MG/ML
50 INJECTION, SOLUTION INTRAMUSCULAR EVERY 6 HOURS PRN
Status: DISCONTINUED | OUTPATIENT
Start: 2024-10-06 | End: 2024-10-11 | Stop reason: HOSPADM

## 2024-10-06 RX ORDER — MORPHINE SULFATE 2 MG/ML
1 INJECTION, SOLUTION INTRAMUSCULAR; INTRAVENOUS EVERY 4 HOURS PRN
Status: DISCONTINUED | OUTPATIENT
Start: 2024-10-06 | End: 2024-10-11 | Stop reason: HOSPADM

## 2024-10-06 RX ORDER — LORAZEPAM 2 MG/ML
1 INJECTION INTRAMUSCULAR ONCE
Status: COMPLETED | OUTPATIENT
Start: 2024-10-06 | End: 2024-10-06

## 2024-10-06 RX ADMIN — DIPHENHYDRAMINE HYDROCHLORIDE 50 MG: 50 INJECTION INTRAMUSCULAR; INTRAVENOUS at 05:26

## 2024-10-06 RX ADMIN — MORPHINE SULFATE 2 MG: 2 INJECTION, SOLUTION INTRAMUSCULAR; INTRAVENOUS at 13:41

## 2024-10-06 RX ADMIN — BUSPIRONE HYDROCHLORIDE 10 MG: 10 TABLET ORAL at 21:21

## 2024-10-06 RX ADMIN — PANCRELIPASE LIPASE, PANCRELIPASE PROTEASE, PANCRELIPASE AMYLASE 80000 UNITS: 20000; 63000; 84000 CAPSULE, DELAYED RELEASE ORAL at 08:01

## 2024-10-06 RX ADMIN — ACETAMINOPHEN 650 MG: 325 TABLET ORAL at 17:52

## 2024-10-06 RX ADMIN — MORPHINE SULFATE 2 MG: 2 INJECTION, SOLUTION INTRAMUSCULAR; INTRAVENOUS at 17:51

## 2024-10-06 RX ADMIN — SULFAMETHOXAZOLE AND TRIMETHOPRIM 1 TABLET: 800; 160 TABLET ORAL at 21:27

## 2024-10-06 RX ADMIN — SODIUM CHLORIDE, PRESERVATIVE FREE 10 ML: 5 INJECTION INTRAVENOUS at 08:02

## 2024-10-06 RX ADMIN — MORPHINE SULFATE 4 MG: 4 INJECTION, SOLUTION INTRAMUSCULAR; INTRAVENOUS at 05:26

## 2024-10-06 RX ADMIN — LORAZEPAM 1 MG: 2 INJECTION INTRAMUSCULAR; INTRAVENOUS at 10:40

## 2024-10-06 RX ADMIN — ENOXAPARIN SODIUM 40 MG: 100 INJECTION SUBCUTANEOUS at 08:02

## 2024-10-06 RX ADMIN — PANCRELIPASE LIPASE, PANCRELIPASE PROTEASE, PANCRELIPASE AMYLASE 80000 UNITS: 20000; 63000; 84000 CAPSULE, DELAYED RELEASE ORAL at 11:45

## 2024-10-06 RX ADMIN — ACETAMINOPHEN 650 MG: 325 TABLET ORAL at 11:44

## 2024-10-06 RX ADMIN — MORPHINE SULFATE 4 MG: 4 INJECTION, SOLUTION INTRAMUSCULAR; INTRAVENOUS at 01:22

## 2024-10-06 RX ADMIN — PANTOPRAZOLE SODIUM 40 MG: 40 INJECTION, POWDER, FOR SOLUTION INTRAVENOUS at 21:26

## 2024-10-06 RX ADMIN — DIPHENHYDRAMINE HYDROCHLORIDE 50 MG: 50 INJECTION INTRAMUSCULAR; INTRAVENOUS at 13:41

## 2024-10-06 RX ADMIN — MORPHINE SULFATE 2 MG: 2 INJECTION, SOLUTION INTRAMUSCULAR; INTRAVENOUS at 21:49

## 2024-10-06 RX ADMIN — PANCRELIPASE LIPASE, PANCRELIPASE PROTEASE, PANCRELIPASE AMYLASE 80000 UNITS: 20000; 63000; 84000 CAPSULE, DELAYED RELEASE ORAL at 17:52

## 2024-10-06 RX ADMIN — DIPHENHYDRAMINE HYDROCHLORIDE 50 MG: 50 INJECTION INTRAMUSCULAR; INTRAVENOUS at 09:31

## 2024-10-06 RX ADMIN — BUSPIRONE HYDROCHLORIDE 10 MG: 10 TABLET ORAL at 14:26

## 2024-10-06 RX ADMIN — SULFAMETHOXAZOLE AND TRIMETHOPRIM 1 TABLET: 800; 160 TABLET ORAL at 11:47

## 2024-10-06 RX ADMIN — DIPHENHYDRAMINE HYDROCHLORIDE 50 MG: 50 INJECTION INTRAMUSCULAR; INTRAVENOUS at 01:23

## 2024-10-06 RX ADMIN — MORPHINE SULFATE 4 MG: 4 INJECTION, SOLUTION INTRAMUSCULAR; INTRAVENOUS at 09:31

## 2024-10-06 RX ADMIN — SODIUM CHLORIDE, PRESERVATIVE FREE 10 ML: 5 INJECTION INTRAVENOUS at 21:49

## 2024-10-06 RX ADMIN — PANTOPRAZOLE SODIUM 40 MG: 40 INJECTION, POWDER, FOR SOLUTION INTRAVENOUS at 08:02

## 2024-10-06 RX ADMIN — BUSPIRONE HYDROCHLORIDE 10 MG: 10 TABLET ORAL at 08:01

## 2024-10-06 ASSESSMENT — PAIN DESCRIPTION - ORIENTATION
ORIENTATION: LEFT

## 2024-10-06 ASSESSMENT — PAIN DESCRIPTION - LOCATION
LOCATION: ABDOMEN;NECK;SHOULDER
LOCATION: ABDOMEN
LOCATION: ABDOMEN;NECK;SHOULDER
LOCATION: ABDOMEN;NECK

## 2024-10-06 ASSESSMENT — PAIN SCALES - GENERAL
PAINLEVEL_OUTOF10: 9
PAINLEVEL_OUTOF10: 8
PAINLEVEL_OUTOF10: 10
PAINLEVEL_OUTOF10: 8
PAINLEVEL_OUTOF10: 0
PAINLEVEL_OUTOF10: 5
PAINLEVEL_OUTOF10: 7
PAINLEVEL_OUTOF10: 9
PAINLEVEL_OUTOF10: 9
PAINLEVEL_OUTOF10: 8
PAINLEVEL_OUTOF10: 7

## 2024-10-06 ASSESSMENT — PAIN DESCRIPTION - DESCRIPTORS
DESCRIPTORS: ACHING;SHARP
DESCRIPTORS: ACHING;CRAMPING
DESCRIPTORS: SHARP
DESCRIPTORS: SHARP
DESCRIPTORS: ACHING;SHARP
DESCRIPTORS: ACHING;SHARP
DESCRIPTORS: SHARP

## 2024-10-06 NOTE — PLAN OF CARE
Problem: Discharge Planning  Goal: Discharge to home or other facility with appropriate resources  10/5/2024 2132 by Silva Mccarthy, RN  Outcome: Progressing     Problem: Risk for Elopement  Goal: Patient will not exit the unit/facility without proper excort  10/5/2024 2132 by Silva Mccarthy, RN  Outcome: Progressing  Flowsheets (Taken 10/5/2024 2007)  Nursing Interventions for Elopement Risk: Assist with personal care needs such as toileting, eating, dressing, as needed to reduce the risk of wandering     Problem: Pain  Goal: Verbalizes/displays adequate comfort level or baseline comfort level  10/5/2024 2132 by Silva Mccarthy, RN  Outcome: Progressing

## 2024-10-06 NOTE — PROGRESS NOTES
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 History of multiple celiac plexus blocks in the past. OSH CT A/P diffusely atrophic pancreas, mild heterogeneity around the pancreatic head and uncinate process, possible acute pancreatitis. OSH: WBC 3.8. Lipase 26. CK 89.   Continue Zenpep (substituted for Creon)  Pain control, cut down on pain medications today.  Protonix 40mg BID  On Bentyl (been refusing)  Off IV fluids, on modified regular diet.  CT interpretation of outside films  GI following, appreciate recommendations, recommended to transfer to [facility], plan to transfer to [facility] on Monday, spoke to them on 10/5, the pancreatic specialist who can help with nerve block for patient's pain will not be available till Monday.  Plan to initiate transfer then.  See intake encounter for further details.  Nutrition consult per patient request     Left shoulder pain: Underwent extensive workup previous admission, see discharge summary dated on 9/18/2024, was believed to be myositis.  ID and Ortho following.  MRI unchanged from previously, shows edema/inflammation in left trapezius muscle and left teres minor muscle but no abscess/fluid collection.  Sed rate and CRP negative.  Initial CK of 60.  Procalcitonin of 0.04.  On 10/6: CRP less than 0.30, CK of 177 and sed rate of 23.  Patient has been picking and scratching her left trapezius muscle constantly and it is getting red and worse.  She was complaining of severe pain to me today for her left trapezius muscle.  I notified ID physician who started her on Bactrim.      Blood cultures 1 out of 2 positive: Gram-positive cocci in clusters.  ID

## 2024-10-06 NOTE — PROGRESS NOTES
Gastroenterology Progress Note:     Patient Name:  Dixon Conn   MRN: [id number]  [id number]  YOB: 1990  Admit Date: 10/3/2024  6:10 PM  Primary Care Physician: Schoen, Ashley, PA   6K-27/027-A     Patient seen and examined.  24 hours events and chart reviewed.    Subjective: Upset, crying   States pain is not controlled, in \"pancreas and neck  Psych saw  Discussed with ID earlier    Objective:  /79   Pulse (!) 105   Temp 97.7 °F (36.5 °C) (Oral)   Resp 18   Ht 1.702 m (5' 7.01\")   Wt 62.4 kg (137 lb 9.1 oz)   LMP 09/04/2024 (Within Days)   SpO2 99%   BMI 21.54 kg/m²     Review of Systems - Gastrointestinal ROS: positive for - abdominal pain, appetite loss, diarrhea, and nausea/vomiting     Physical Exam:    General:  Nourished, emotional  HEENT: Atraumatic, normocephalic. Moist oral mucous membranes.  Neck: Supple without adenopathy, JVD, thyromegaly or masses. Trachea midline.  CV: Heart RRR, no murmurs, rubs, gallops.  Resp: Even, easy without cough or accessory use. Lungs clear to ascultation bilaterally.   Abd: Round, soft, generally tender. No hepatosplenomegaly or mass present. Active bowel sounds heard. No distention noted.   Ext:  Without cyanosis, clubbing, edema.   Skin: Pink, warm, dry  Neuro:  Alert, oriented x 3 with no obvious deficits.       Rectal: deferred    Labs:   CBC:   Lab Results   Component Value Date/Time    WBC 1.7 10/06/2024 07:58 AM    HGB 11.0 10/06/2024 07:58 AM    HCT 36.0 10/06/2024 07:58 AM    MCV 87.2 10/06/2024 07:58 AM     10/06/2024 07:58 AM     BMP:   Lab Results   Component Value Date/Time     10/06/2024 07:58 AM    K 3.9 10/06/2024 07:58 AM    K 3.5 10/04/2024 07:50 AM     10/06/2024 07:58 AM    CO2 22 10/06/2024 07:58 AM    BUN 5 10/06/2024 07:58 AM    CREATININE 0.5 10/06/2024 07:58 AM    CALCIUM 8.7 10/06/2024 07:58 AM     PT/INR:   Lab Results   Component Value Date/Time    INR 1.01 10/04/2024 07:50 AM     Lipids:   Lab

## 2024-10-06 NOTE — PROCEDURES
PROCEDURE NOTE  Date: 10/6/2024   Name: Dixon Conn  YOB: 1990    Procedures EKG completed, given to RN

## 2024-10-06 NOTE — PROGRESS NOTES
Pharmacy Note  BioFire Result    Dixon Conn is a 34 y.o. female, with a positive blood culture result:    First Gram stain result: gram positive cocci in clusters in 1 of 2 cultures    BioFire BCID result: No organism detected    BioFire BCID and gram stain congruent? No    Suspected source? Contaminant?    Patient chart has been reviewed for signs/symptoms of infection: Yes  /79   Pulse (!) 105   Temp 97.7 °F (36.5 °C) (Oral)   Resp 18   Ht 1.702 m (5' 7.01\")   Wt 62.4 kg (137 lb 9.1 oz)   LMP 09/04/2024 (Within Days)   SpO2 99%   BMI 21.54 kg/m²   Lab Results   Component Value Date    WBC 1.7 (L) 10/06/2024     Allergies reviewed  Keflex [cephalexin], Adhesive tape, Fentanyl, Haldol [haloperidol], Ketamine, Percocet [oxycodone-acetaminophen], Reglan [metoclopramide], Toradol [ketorolac tromethamine], Tramadol, Vancomycin, and Nsaids    Renal function reviewed  Estimated Creatinine Clearance: 151 mL/min (based on SCr of 0.5 mg/dL).    Current antibiotic regimen: Bactrim DS    Intervention needed: No    Individual contacted: none. Dr Wade has documented possible contaminant in his note    Recommendations: none    Recommendations accepted? n/a    Arlen Damon RP  10/6/2024 1:01 PM

## 2024-10-06 NOTE — PLAN OF CARE
Problem: Discharge Planning  Goal: Discharge to home or other facility with appropriate resources  10/6/2024 0859 by Carmen Valles RN  Outcome: Progressing  Flowsheets (Taken 10/5/2024 0750)  Discharge to home or other facility with appropriate resources: Identify barriers to discharge with patient and caregiver  10/5/2024 2132 by Silva Mccarthy RN  Outcome: Progressing     Problem: Risk for Elopement  Goal: Patient will not exit the unit/facility without proper excort  10/6/2024 0859 by Carmen Valles RN  Outcome: Progressing  Flowsheets (Taken 10/6/2024 0800)  Nursing Interventions for Elopement Risk: Assist with personal care needs such as toileting, eating, dressing, as needed to reduce the risk of wandering  10/5/2024 2132 by Silva Mccarthy RN  Outcome: Progressing  Flowsheets (Taken 10/5/2024 2007)  Nursing Interventions for Elopement Risk: Assist with personal care needs such as toileting, eating, dressing, as needed to reduce the risk of wandering     Problem: Pain  Goal: Verbalizes/displays adequate comfort level or baseline comfort level  10/6/2024 0859 by Carmen Valles RN  Outcome: Progressing  Flowsheets (Taken 10/4/2024 1041)  Verbalizes/displays adequate comfort level or baseline comfort level: Encourage patient to monitor pain and request assistance  10/5/2024 2132 by Silva Mccarthy RN  Outcome: Progressing   Patient aware and updated on plan of care

## 2024-10-06 NOTE — PROGRESS NOTES
Progress note: Infectious diseases    Patient - Dixon Conn,  Age - 34 y.o.    - 1990      Room Number - 6K-27/027-A   MRN -  [id number]   [facility] # - [id number]  Date of Admission -  10/3/2024  6:10 PM    SUBJECTIVE:   She has been scratching her neck, has now open wound with redness. MRI was not suggestive of abscess.  No fever  OBJECTIVE   VITALS    height is 1.702 m (5' 7.01\") and weight is 62.4 kg (137 lb 9.1 oz). Her oral temperature is 97.8 °F (36.6 °C). Her blood pressure is 120/72 and her pulse is 88. Her respiration is 18 and oxygen saturation is 100%.       Wt Readings from Last 3 Encounters:   10/04/24 62.4 kg (137 lb 9.1 oz)   24 63.5 kg (140 lb)   09/10/24 63.5 kg (140 lb)       I/O (24 Hours)  No intake or output data in the 24 hours ending 10/06/24 1046    General Appearance  Awake, alert, oriented,  not  In acute distress  HEENT - normocephalic, atraumatic, pink conjunctiva,  anicteric sclera  Neck - there is open wound on the left neck from scratching, the area has redness.  Lungs -  Bilateral   air entry, no rhonchi, no wheeze  Cardiovascular - Heart sounds are normal.     Abdomen - soft, not distended, nontender,   Neurologic -oriented  Skin - No bruising or bleeding  Extremities - No edema, no cyanosis, clubbing     MEDICATIONS:      busPIRone  10 mg Oral TID    lipase-protease-amylase  80,000 Units Oral TID WC    dicyclomine  20 mg Oral 4x Daily AC & HS    acetaminophen  650 mg Oral 4 times per day    pantoprazole  40 mg IntraVENous BID    enoxaparin  40 mg SubCUTAneous Daily    sodium chloride flush  5-40 mL IntraVENous 2 times per day      sodium chloride       morphine **OR** morphine, lipase-protease-amylase, sodium chloride flush, sodium chloride, [DISCONTINUED] potassium chloride **OR** potassium chloride, magnesium sulfate, ondansetron **OR** ondansetron, diphenhydrAMINE      LABS:  unspecified pancreatitis type K85.90    Acute pain of left shoulder M25.512    Dysphagia R13.10    Leukopenia D72.819    Dysautonomia (HCC) G90.1         ASSESSMENT/PLAN   Chronic  pancreatites  Myoscites of the trapezius muscle: she has been scratching the area now with open wound  MDD with anxiety  Pain medication seeking behaviour:   Leukopenia likely related to high dose iv Benadryl  Suggest : oral Bactrim for 5 days  Consider stopping Ativan per psychiatry  recommendation  Need to de-escalate narcotics.  Positive blood cx 1/2 ?contaminant          Johnny Wade MD, 10/6/2024 10:46 AM

## 2024-10-07 PROBLEM — E44.0 MODERATE MALNUTRITION (HCC): Status: ACTIVE | Noted: 2024-10-07

## 2024-10-07 LAB
ALBUMIN SERPL BCG-MCNC: 3.3 G/DL (ref 3.5–5.1)
ALP SERPL-CCNC: 90 U/L (ref 38–126)
ALT SERPL W/O P-5'-P-CCNC: 37 U/L (ref 11–66)
ANION GAP SERPL CALC-SCNC: 12 MEQ/L (ref 8–16)
AST SERPL-CCNC: 38 U/L (ref 5–40)
BACTERIA BLD AEROBE CULT: ABNORMAL
BACTERIA BLD AEROBE CULT: ABNORMAL
BASOPHILS ABSOLUTE: 0 THOU/MM3 (ref 0–0.1)
BASOPHILS NFR BLD AUTO: 0.5 %
BILIRUB SERPL-MCNC: 0.2 MG/DL (ref 0.3–1.2)
BUN SERPL-MCNC: 11 MG/DL (ref 7–22)
BURR CELLS BLD QL SMEAR: ABNORMAL
CALCIUM SERPL-MCNC: 8.6 MG/DL (ref 8.5–10.5)
CHLORIDE SERPL-SCNC: 105 MEQ/L (ref 98–111)
CK SERPL-CCNC: 100 U/L (ref 30–135)
CO2 SERPL-SCNC: 22 MEQ/L (ref 23–33)
CREAT SERPL-MCNC: 0.6 MG/DL (ref 0.4–1.2)
DEPRECATED RDW RBC AUTO: 47.2 FL (ref 35–45)
EOSINOPHIL NFR BLD AUTO: 6 %
EOSINOPHILS ABSOLUTE: 0.1 THOU/MM3 (ref 0–0.4)
ERYTHROCYTE [DISTWIDTH] IN BLOOD BY AUTOMATED COUNT: 15 % (ref 11.5–14.5)
GFR SERPL CREATININE-BSD FRML MDRD: > 90 ML/MIN/1.73M2
GLUCOSE SERPL-MCNC: 99 MG/DL (ref 70–108)
HCT VFR BLD AUTO: 36.5 % (ref 37–47)
HGB BLD-MCNC: 11.3 GM/DL (ref 12–16)
IMM GRANULOCYTES # BLD AUTO: 0 THOU/MM3 (ref 0–0.07)
IMM GRANULOCYTES NFR BLD AUTO: 0 %
LYMPHOCYTES ABSOLUTE: 0.9 THOU/MM3 (ref 1–4.8)
LYMPHOCYTES NFR BLD AUTO: 44.7 %
MCH RBC QN AUTO: 26.7 PG (ref 26–33)
MCHC RBC AUTO-ENTMCNC: 31 GM/DL (ref 32.2–35.5)
MCV RBC AUTO: 86.3 FL (ref 81–99)
MONOCYTES ABSOLUTE: 0.4 THOU/MM3 (ref 0.4–1.3)
MONOCYTES NFR BLD AUTO: 18.6 %
NEUTROPHILS ABSOLUTE: 0.6 THOU/MM3 (ref 1.8–7.7)
NEUTROPHILS NFR BLD AUTO: 30.2 %
NRBC BLD AUTO-RTO: 0 /100 WBC
ORGANISM: ABNORMAL
PATHOLOGIST REVIEW: ABNORMAL
PLATELET # BLD AUTO: 146 THOU/MM3 (ref 130–400)
PLATELET BLD QL SMEAR: ADEQUATE
PMV BLD AUTO: 12.4 FL (ref 9.4–12.4)
POTASSIUM SERPL-SCNC: 4.1 MEQ/L (ref 3.5–5.2)
PROT SERPL-MCNC: 6.2 G/DL (ref 6.1–8)
RBC # BLD AUTO: 4.23 MILL/MM3 (ref 4.2–5.4)
SCAN OF BLOOD SMEAR: NORMAL
SCHISTOCYTES BLD QL SMEAR: ABNORMAL
SODIUM SERPL-SCNC: 139 MEQ/L (ref 135–145)
VARIANT LYMPHS BLD QL SMEAR: ABNORMAL %
WBC # BLD AUTO: 2 THOU/MM3 (ref 4.8–10.8)

## 2024-10-07 PROCEDURE — 6360000002 HC RX W HCPCS: Performed by: STUDENT IN AN ORGANIZED HEALTH CARE EDUCATION/TRAINING PROGRAM

## 2024-10-07 PROCEDURE — 80053 COMPREHEN METABOLIC PANEL: CPT

## 2024-10-07 PROCEDURE — 1200000000 HC SEMI PRIVATE

## 2024-10-07 PROCEDURE — 82550 ASSAY OF CK (CPK): CPT

## 2024-10-07 PROCEDURE — 85025 COMPLETE CBC W/AUTO DIFF WBC: CPT

## 2024-10-07 PROCEDURE — 6370000000 HC RX 637 (ALT 250 FOR IP): Performed by: STUDENT IN AN ORGANIZED HEALTH CARE EDUCATION/TRAINING PROGRAM

## 2024-10-07 PROCEDURE — 6370000000 HC RX 637 (ALT 250 FOR IP): Performed by: INTERNAL MEDICINE

## 2024-10-07 PROCEDURE — 36415 COLL VENOUS BLD VENIPUNCTURE: CPT

## 2024-10-07 PROCEDURE — 99232 SBSQ HOSP IP/OBS MODERATE 35: CPT | Performed by: INTERNAL MEDICINE

## 2024-10-07 PROCEDURE — 6360000002 HC RX W HCPCS

## 2024-10-07 PROCEDURE — 87040 BLOOD CULTURE FOR BACTERIA: CPT

## 2024-10-07 PROCEDURE — 2580000003 HC RX 258

## 2024-10-07 PROCEDURE — 6370000000 HC RX 637 (ALT 250 FOR IP): Performed by: NURSE PRACTITIONER

## 2024-10-07 RX ADMIN — SULFAMETHOXAZOLE AND TRIMETHOPRIM 1 TABLET: 800; 160 TABLET ORAL at 20:59

## 2024-10-07 RX ADMIN — BUSPIRONE HYDROCHLORIDE 10 MG: 10 TABLET ORAL at 08:54

## 2024-10-07 RX ADMIN — MORPHINE SULFATE 2 MG: 2 INJECTION, SOLUTION INTRAMUSCULAR; INTRAVENOUS at 22:53

## 2024-10-07 RX ADMIN — MORPHINE SULFATE 2 MG: 2 INJECTION, SOLUTION INTRAMUSCULAR; INTRAVENOUS at 14:39

## 2024-10-07 RX ADMIN — MORPHINE SULFATE 2 MG: 2 INJECTION, SOLUTION INTRAMUSCULAR; INTRAVENOUS at 05:57

## 2024-10-07 RX ADMIN — PANCRELIPASE LIPASE, PANCRELIPASE PROTEASE, PANCRELIPASE AMYLASE 80000 UNITS: 20000; 63000; 84000 CAPSULE, DELAYED RELEASE ORAL at 08:54

## 2024-10-07 RX ADMIN — SODIUM CHLORIDE, PRESERVATIVE FREE 10 ML: 5 INJECTION INTRAVENOUS at 20:59

## 2024-10-07 RX ADMIN — MORPHINE SULFATE 2 MG: 2 INJECTION, SOLUTION INTRAMUSCULAR; INTRAVENOUS at 01:48

## 2024-10-07 RX ADMIN — BUSPIRONE HYDROCHLORIDE 10 MG: 10 TABLET ORAL at 20:59

## 2024-10-07 RX ADMIN — SODIUM CHLORIDE, PRESERVATIVE FREE 10 ML: 5 INJECTION INTRAVENOUS at 08:54

## 2024-10-07 RX ADMIN — ACETAMINOPHEN 650 MG: 325 TABLET ORAL at 17:18

## 2024-10-07 RX ADMIN — PANTOPRAZOLE SODIUM 40 MG: 40 INJECTION, POWDER, FOR SOLUTION INTRAVENOUS at 08:54

## 2024-10-07 RX ADMIN — ACETAMINOPHEN 650 MG: 325 TABLET ORAL at 22:53

## 2024-10-07 RX ADMIN — PANCRELIPASE LIPASE, PANCRELIPASE PROTEASE, PANCRELIPASE AMYLASE 80000 UNITS: 20000; 63000; 84000 CAPSULE, DELAYED RELEASE ORAL at 17:19

## 2024-10-07 RX ADMIN — ENOXAPARIN SODIUM 40 MG: 100 INJECTION SUBCUTANEOUS at 08:54

## 2024-10-07 RX ADMIN — MORPHINE SULFATE 2 MG: 2 INJECTION, SOLUTION INTRAMUSCULAR; INTRAVENOUS at 09:59

## 2024-10-07 RX ADMIN — ACETAMINOPHEN 650 MG: 325 TABLET ORAL at 11:09

## 2024-10-07 RX ADMIN — SULFAMETHOXAZOLE AND TRIMETHOPRIM 1 TABLET: 800; 160 TABLET ORAL at 08:54

## 2024-10-07 RX ADMIN — PANCRELIPASE LIPASE, PANCRELIPASE PROTEASE, PANCRELIPASE AMYLASE 80000 UNITS: 20000; 63000; 84000 CAPSULE, DELAYED RELEASE ORAL at 11:07

## 2024-10-07 RX ADMIN — MORPHINE SULFATE 2 MG: 2 INJECTION, SOLUTION INTRAMUSCULAR; INTRAVENOUS at 18:52

## 2024-10-07 RX ADMIN — BUSPIRONE HYDROCHLORIDE 10 MG: 10 TABLET ORAL at 14:39

## 2024-10-07 RX ADMIN — PANTOPRAZOLE SODIUM 40 MG: 40 INJECTION, POWDER, FOR SOLUTION INTRAVENOUS at 20:59

## 2024-10-07 ASSESSMENT — PAIN DESCRIPTION - ORIENTATION
ORIENTATION: LEFT

## 2024-10-07 ASSESSMENT — PAIN DESCRIPTION - DESCRIPTORS
DESCRIPTORS: ACHING;SHARP
DESCRIPTORS: DISCOMFORT
DESCRIPTORS: SHARP

## 2024-10-07 ASSESSMENT — PAIN SCALES - GENERAL
PAINLEVEL_OUTOF10: 10
PAINLEVEL_OUTOF10: 9
PAINLEVEL_OUTOF10: 9
PAINLEVEL_OUTOF10: 10
PAINLEVEL_OUTOF10: 7
PAINLEVEL_OUTOF10: 9
PAINLEVEL_OUTOF10: 10
PAINLEVEL_OUTOF10: 0
PAINLEVEL_OUTOF10: 9
PAINLEVEL_OUTOF10: 0

## 2024-10-07 ASSESSMENT — PAIN SCALES - WONG BAKER
WONGBAKER_NUMERICALRESPONSE: HURTS A LITTLE BIT
WONGBAKER_NUMERICALRESPONSE: HURTS A LITTLE BIT

## 2024-10-07 ASSESSMENT — PAIN DESCRIPTION - FREQUENCY: FREQUENCY: CONTINUOUS

## 2024-10-07 ASSESSMENT — PAIN DESCRIPTION - LOCATION
LOCATION: ABDOMEN;NECK;SHOULDER

## 2024-10-07 ASSESSMENT — PAIN - FUNCTIONAL ASSESSMENT: PAIN_FUNCTIONAL_ASSESSMENT: ACTIVITIES ARE NOT PREVENTED

## 2024-10-07 ASSESSMENT — PAIN DESCRIPTION - PAIN TYPE: TYPE: ACUTE PAIN

## 2024-10-07 ASSESSMENT — PAIN DESCRIPTION - ONSET: ONSET: ON-GOING

## 2024-10-07 NOTE — PROGRESS NOTES
Patient had expressed some concerns with Kyaw RAMIREZ about Dr. Wade ID note from this morning. Patient states that in Dr. Wade's note that psychiatric had documented that the patient has a substance use disorder, and had ask me to reach out to psych since their note is blocked and she is not able to see their note. Kyaw RAMIREZ reached out to Zara Bernstein MD from psych who saw this patient. Plan from psych is to stop benzos and signing off, follow outpatient. Hospitalist notified.

## 2024-10-07 NOTE — PLAN OF CARE
Reviewed all imaging completed since admission, compared to prior imaging as well  -clinically does not present with concern for septic shoulder and imaging of her swelling at the neck does not show any fluid consolidation or areas available to aspirate vs indicate irrigation   -no indication for aspiration   -no surgical intervention recommended at this time    Ortho available as needed while in house    Magalis Duval PA-C

## 2024-10-07 NOTE — PROGRESS NOTES
Hospitalist Progress Note      Patient:  Dixon Conn 34 y.o. 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 GI has followed and optimized pancreatic enzymes and diet, PPI, bentyl; they recommend transfer to tertiary center for additional specialize support. Initial contact made with [facility] over the weekend, but [facility] requested we re-contact on 10/7/24 for a pancreatic specialist to be the accepting physician. Await word from [facility] on location ([facility] vs [facility]) depending on whether specialist travels to [facility]. If the former, anticipate discharge later today, otherwise would be tomorrow at the earliest. In the meantime we will continue all medications as currently ordered.  Leukopenia - with neutropenia ANC < 500, on neutropenic precautions. There is high suspicion that the high dose IV benadryl is contributing to the drop, so it is held at this time. Will recheck CBC with diff on 10/8 - if level rises enough we can consider restarting but trying for less frequent doses (pt notes dose threshold dependence - not effective at 25mg); main symptomatic complaint necessitating the usage is nausea rather than pruritus. Pt confirms reaction to Fentanyl, so we will not change the opiate at this time since it appears to be the best tolerated. Anticipate [facility] specialists to follow up on this active problem to either further confirm or refute the possible diagnosis.  L Trapezius myositis - minor skin breakdown from excoration so on Bactrim at this time; imaging done confirms no abscess/fluid collection  Dysphagia - imaging confirms mild enlargement of adenoids, pt tolerating modified diet  Medication seeking behavior - documented from interactions with other  physicians, including psychiatrist Dr. Harvey. I did speak at length with the patient about the nature of patients with chronic pain-causing diseases to have an understandable focus on the management of their primary symptom, pain. I did attempt to explain my management position wherein I want her to be comfortable, but if there is ever a conflict between side effects of symptom-managing medications and overall health (such as the concern for leukopenia from the IV high dose benadryl) that my focus will be on maintaining her health, even if that means withholding symptom-managing medications for a time. Ultimately, I believe she would be well served with a developed management plan so she does need to advocate for herself and instead would know we have a consistent plan that doesn't vary from [id number]. For now, our main focus is on transferring her to a tertiary center so they can further manage her condition. Her outpatient specialists will likely serve a primary role in helping develop the management plan.  Resolved Problems  Diarrhea  Chronic Conditions (reviewed and stable unless otherwise stated)  Dysautonomia  Anxiety - psychiatry recommending Buspar for more consistent management rather than the limited brief symptom affects that benzos provide      LDA: []CVC / []PICC / []Midline / []Dixon / []Drains / []Mediport / [x]None  Antibiotics: yes  Steroids: no  Labs (still needed?): [x]Yes / []No  IVF (still needed?): []Yes / [x]No    Level of care: []Step Down / [x]Med-Surg  Bed Status: [x]Inpatient / []Observation  Telemetry: []Yes / [x]No  PT/OT: []Yes / [x]No    DVT Prophylaxis: [x] Lovenox / [] Heparin / [] SCDs / [] Already on Systemic Anticoagulation / [] None   Code status: Full Code     Expected discharge date:  10/7-8 pending placement  Disposition: [facility]     ===================================================================    Chief Complaint: Abdominal pain, headache  Subjective (past

## 2024-10-07 NOTE — PROGRESS NOTES
Patient is very argumentative. She wants her pain medication and IV Benadryl. When confronted on her pain seeking behaviour  she was not  happy and wants  a different doctor. I spoke with the . I feel the hospital adminstration need to be notified. She can not continue to fire hospitalists, so far she had fired three hospitalists because they didn't agree with her pain and Benadryl request. The psychiatrist has clearly documen zara her ativan seeking behaviour. Will discuss with hospitalist director. I discussed with Dr Toussaint. Patient has definitely substance use disorder that needs attention. ID will not follow patient per her request.

## 2024-10-07 NOTE — PROGRESS NOTES
Gastroenterology Progress Note:     Patient Name:  Dixon Conn   MRN: [id number]  [id number]  YOB: 1990  Admit Date: 10/3/2024  6:10 PM  Primary Care Physician: Schoen, Ashley, PA   6K-27/027-A     Patient seen and examined.  24 hours events and chart reviewed.    Subjective:   Tolerating diet a little better today with pureed  Reviewed records received from [place]  Awaiting [facility] bed  Discussed plans    Objective:  /67   Pulse 72   Temp 98 °F (36.7 °C) (Oral)   Resp 18   Ht 1.702 m (5' 7.01\")   Wt 62.4 kg (137 lb 9.1 oz)   LMP 09/04/2024 (Within Days)   SpO2 100%   BMI 21.54 kg/m²     Review of Systems - Gastrointestinal ROS: positive for - abdominal pain, diarrhea    Physical Exam:    General:  Nourished, emotional  HEENT: Atraumatic, normocephalic. Moist oral mucous membranes.  Neck: Supple without adenopathy, JVD, thyromegaly or masses. Trachea midline.  CV: Heart RRR, no murmurs, rubs, gallops.  Resp: Even, easy without cough or accessory use. Lungs clear to ascultation bilaterally.   Abd: Round, soft, generally tender. No hepatosplenomegaly or mass present. Active bowel sounds heard. No distention noted.   Ext:  Without cyanosis, clubbing, edema.   Skin: Pink, warm, dry  Neuro:  Alert, oriented x 3 with no obvious deficits.       Rectal: deferred    Labs:   CBC:   Lab Results   Component Value Date/Time    WBC 2.0 10/07/2024 08:15 AM    HGB 11.3 10/07/2024 08:15 AM    HCT 36.5 10/07/2024 08:15 AM    MCV 86.3 10/07/2024 08:15 AM     10/07/2024 08:15 AM     BMP:   Lab Results   Component Value Date/Time     10/07/2024 08:15 AM    K 4.1 10/07/2024 08:15 AM    K 3.5 10/04/2024 07:50 AM     10/07/2024 08:15 AM    CO2 22 10/07/2024 08:15 AM    BUN 11 10/07/2024 08:15 AM    CREATININE 0.6 10/07/2024 08:15 AM    CALCIUM 8.6 10/07/2024 08:15 AM     PT/INR:   Lab Results   Component Value Date/Time    INR 1.01 10/04/2024 07:50 AM     Lipids:   Lab Results   Component Value

## 2024-10-07 NOTE — CARE COORDINATION
10/7/24, 1:20 PM EDT    DISCHARGE ON GOING EVALUATION    [facility] day: 4  Location: [facility]27/027 Reason for admit: Pancreatitis, unspecified pancreatitis type [K85.90]     Procedures: none    Imaging since last note: none    Barriers to Discharge: Await bed at [facility] for pancreatic specialist. Not responding to current treatment. Bactrim. Zenep. IV protonix. Bentyl. PRN compazine and morphine.     PCP: Schoen, Ashley, PA  Readmission Risk Score: 12    Patient Goals/Plan/Treatment Preferences: Plan transfer to [facility] when bed available. From home with  prior.

## 2024-10-07 NOTE — PLAN OF CARE
Problem: Discharge Planning  Goal: Discharge to home or other facility with appropriate resources  10/6/2024 2255 by Silva Mccarthy RN  Outcome: Progressing     Problem: Risk for Elopement  Goal: Patient will not exit the unit/facility without proper excort  10/6/2024 2255 by Silva Mccarthy, RN  Outcome: Progressing    Problem: Pain  Goal: Verbalizes/displays adequate comfort level or baseline comfort level  10/6/2024 2255 by Silva Mccarthy, RN  Outcome: Progressing

## 2024-10-07 NOTE — PROGRESS NOTES
Comprehensive Nutrition Assessment    Type and Reason for Visit:  Initial, Consult (ONS)    Nutrition Recommendations/Plan:   Consider MVI if patient able to tolerate with current GI issues.  Continue to offer ONS: Ensure Clear TID-patient hasn't tried yet but states she will. Dislikes shake versions.  Continue current diet-patient voiced pureed diet is working better for her.  Pancreatitis/low fat diet reinforced (previous education provided 9/13/24 on previous admit), handouts given.  Note plan to transfer to [facility].      Malnutrition Assessment:  Malnutrition Status:  Moderate malnutrition (10/07/24 1227)    Context:  Chronic Illness     Findings of the 6 clinical characteristics of malnutrition:  Energy Intake:  75% or less estimated energy requirements for 1 month or longer  Weight Loss:  No significant weight loss     Body Fat Loss:  Mild body fat loss Orbital   Muscle Mass Loss:  Mild muscle mass loss Clavicles (pectoralis & deltoids), Temples (temporalis)  Fluid Accumulation:  Mild Extremities   Strength:  Not Performed    Nutrition Assessment:     Pt. moderately malnourished AEB criteria as listed above.  At risk for further nutrition compromise r/t admit with chronic atrophic pancreatitis, chronic abdominal pain, chronic diarrhea, nausea/vomiting, recent H pylori gastritis-treatment completed, ? Gastroparesis per [place] notes, left shoulder/neck pain, dysphagia, leukopenia with severe neutropenia, pain medication seeking behavior, chest pain, anxiety,  and underlying medical condition (hx: dysautonomia, adrenal insufficiency, myositis).        Nutrition Related Findings:    Pt. Report/Treatments/Miscellaneous: Patient seen earlier this morning, her nurse was present. She was very upset that benadryl was stopped, wanting RN to get it reordered, stating she has to have 50 mg, 25 mg doesn't work. Reports abdomen pain, requested pain medication at the time.  Nausea and anxiety, \"the only thing that works is

## 2024-10-08 LAB
ANION GAP SERPL CALC-SCNC: 12 MEQ/L (ref 8–16)
BASOPHILS ABSOLUTE: 0 THOU/MM3 (ref 0–0.1)
BASOPHILS NFR BLD AUTO: 0.4 %
BUN SERPL-MCNC: 10 MG/DL (ref 7–22)
CALCIUM SERPL-MCNC: 8.6 MG/DL (ref 8.5–10.5)
CHLORIDE SERPL-SCNC: 106 MEQ/L (ref 98–111)
CO2 SERPL-SCNC: 23 MEQ/L (ref 23–33)
CREAT SERPL-MCNC: 0.5 MG/DL (ref 0.4–1.2)
DEPRECATED RDW RBC AUTO: 46.9 FL (ref 35–45)
EOSINOPHIL NFR BLD AUTO: 4.7 %
EOSINOPHILS ABSOLUTE: 0.1 THOU/MM3 (ref 0–0.4)
ERYTHROCYTE [DISTWIDTH] IN BLOOD BY AUTOMATED COUNT: 15.1 % (ref 11.5–14.5)
GFR SERPL CREATININE-BSD FRML MDRD: > 90 ML/MIN/1.73M2
GLUCOSE SERPL-MCNC: 94 MG/DL (ref 70–108)
HCT VFR BLD AUTO: 34.5 % (ref 37–47)
HGB BLD-MCNC: 10.8 GM/DL (ref 12–16)
IMM GRANULOCYTES # BLD AUTO: 0 THOU/MM3 (ref 0–0.07)
IMM GRANULOCYTES NFR BLD AUTO: 0 %
LYMPHOCYTES ABSOLUTE: 1.4 THOU/MM3 (ref 1–4.8)
LYMPHOCYTES NFR BLD AUTO: 56.7 %
MCH RBC QN AUTO: 26.8 PG (ref 26–33)
MCHC RBC AUTO-ENTMCNC: 31.3 GM/DL (ref 32.2–35.5)
MCV RBC AUTO: 85.6 FL (ref 81–99)
MONOCYTES ABSOLUTE: 0.3 THOU/MM3 (ref 0.4–1.3)
MONOCYTES NFR BLD AUTO: 13 %
NEUTROPHILS ABSOLUTE: 0.6 THOU/MM3 (ref 1.8–7.7)
NEUTROPHILS NFR BLD AUTO: 25.2 %
NRBC BLD AUTO-RTO: 0 /100 WBC
PLATELET # BLD AUTO: 146 THOU/MM3 (ref 130–400)
PMV BLD AUTO: 12.4 FL (ref 9.4–12.4)
POTASSIUM SERPL-SCNC: 4 MEQ/L (ref 3.5–5.2)
RBC # BLD AUTO: 4.03 MILL/MM3 (ref 4.2–5.4)
SODIUM SERPL-SCNC: 141 MEQ/L (ref 135–145)
WBC # BLD AUTO: 2.5 THOU/MM3 (ref 4.8–10.8)

## 2024-10-08 PROCEDURE — 99232 SBSQ HOSP IP/OBS MODERATE 35: CPT | Performed by: INTERNAL MEDICINE

## 2024-10-08 PROCEDURE — 80048 BASIC METABOLIC PNL TOTAL CA: CPT

## 2024-10-08 PROCEDURE — 36415 COLL VENOUS BLD VENIPUNCTURE: CPT

## 2024-10-08 PROCEDURE — 2580000003 HC RX 258

## 2024-10-08 PROCEDURE — 6370000000 HC RX 637 (ALT 250 FOR IP): Performed by: NURSE PRACTITIONER

## 2024-10-08 PROCEDURE — 6370000000 HC RX 637 (ALT 250 FOR IP): Performed by: STUDENT IN AN ORGANIZED HEALTH CARE EDUCATION/TRAINING PROGRAM

## 2024-10-08 PROCEDURE — 6370000000 HC RX 637 (ALT 250 FOR IP): Performed by: INTERNAL MEDICINE

## 2024-10-08 PROCEDURE — 6360000002 HC RX W HCPCS: Performed by: STUDENT IN AN ORGANIZED HEALTH CARE EDUCATION/TRAINING PROGRAM

## 2024-10-08 PROCEDURE — 85025 COMPLETE CBC W/AUTO DIFF WBC: CPT

## 2024-10-08 PROCEDURE — 1200000000 HC SEMI PRIVATE

## 2024-10-08 PROCEDURE — 6360000002 HC RX W HCPCS

## 2024-10-08 RX ADMIN — PROCHLORPERAZINE EDISYLATE 10 MG: 5 INJECTION INTRAMUSCULAR; INTRAVENOUS at 12:56

## 2024-10-08 RX ADMIN — BUSPIRONE HYDROCHLORIDE 10 MG: 10 TABLET ORAL at 08:41

## 2024-10-08 RX ADMIN — MORPHINE SULFATE 2 MG: 2 INJECTION, SOLUTION INTRAMUSCULAR; INTRAVENOUS at 03:03

## 2024-10-08 RX ADMIN — SULFAMETHOXAZOLE AND TRIMETHOPRIM 1 TABLET: 800; 160 TABLET ORAL at 21:08

## 2024-10-08 RX ADMIN — MORPHINE SULFATE 2 MG: 2 INJECTION, SOLUTION INTRAMUSCULAR; INTRAVENOUS at 18:39

## 2024-10-08 RX ADMIN — SODIUM CHLORIDE, PRESERVATIVE FREE 10 ML: 5 INJECTION INTRAVENOUS at 09:04

## 2024-10-08 RX ADMIN — MORPHINE SULFATE 2 MG: 2 INJECTION, SOLUTION INTRAMUSCULAR; INTRAVENOUS at 11:03

## 2024-10-08 RX ADMIN — ENOXAPARIN SODIUM 40 MG: 100 INJECTION SUBCUTANEOUS at 08:46

## 2024-10-08 RX ADMIN — MORPHINE SULFATE 2 MG: 2 INJECTION, SOLUTION INTRAMUSCULAR; INTRAVENOUS at 15:17

## 2024-10-08 RX ADMIN — SULFAMETHOXAZOLE AND TRIMETHOPRIM 1 TABLET: 800; 160 TABLET ORAL at 08:47

## 2024-10-08 RX ADMIN — ACETAMINOPHEN 650 MG: 325 TABLET ORAL at 06:53

## 2024-10-08 RX ADMIN — PANCRELIPASE LIPASE, PANCRELIPASE PROTEASE, PANCRELIPASE AMYLASE 80000 UNITS: 20000; 63000; 84000 CAPSULE, DELAYED RELEASE ORAL at 08:41

## 2024-10-08 RX ADMIN — MORPHINE SULFATE 2 MG: 2 INJECTION, SOLUTION INTRAMUSCULAR; INTRAVENOUS at 06:54

## 2024-10-08 RX ADMIN — SODIUM CHLORIDE, PRESERVATIVE FREE 10 ML: 5 INJECTION INTRAVENOUS at 21:08

## 2024-10-08 RX ADMIN — PANTOPRAZOLE SODIUM 40 MG: 40 INJECTION, POWDER, FOR SOLUTION INTRAVENOUS at 08:40

## 2024-10-08 RX ADMIN — MORPHINE SULFATE 2 MG: 2 INJECTION, SOLUTION INTRAMUSCULAR; INTRAVENOUS at 22:34

## 2024-10-08 ASSESSMENT — PAIN DESCRIPTION - ONSET
ONSET: ON-GOING

## 2024-10-08 ASSESSMENT — PAIN SCALES - GENERAL
PAINLEVEL_OUTOF10: 9
PAINLEVEL_OUTOF10: 5
PAINLEVEL_OUTOF10: 10
PAINLEVEL_OUTOF10: 8
PAINLEVEL_OUTOF10: 6
PAINLEVEL_OUTOF10: 9
PAINLEVEL_OUTOF10: 9
PAINLEVEL_OUTOF10: 4
PAINLEVEL_OUTOF10: 5
PAINLEVEL_OUTOF10: 6
PAINLEVEL_OUTOF10: 10
PAINLEVEL_OUTOF10: 5
PAINLEVEL_OUTOF10: 5
PAINLEVEL_OUTOF10: 6
PAINLEVEL_OUTOF10: 6
PAINLEVEL_OUTOF10: 5
PAINLEVEL_OUTOF10: 10
PAINLEVEL_OUTOF10: 9
PAINLEVEL_OUTOF10: 9
PAINLEVEL_OUTOF10: 6

## 2024-10-08 ASSESSMENT — PAIN DESCRIPTION - DESCRIPTORS
DESCRIPTORS: ACHING
DESCRIPTORS: ACHING;BURNING
DESCRIPTORS: ACHING;SHARP
DESCRIPTORS: DISCOMFORT
DESCRIPTORS: SHARP
DESCRIPTORS: ACHING
DESCRIPTORS: ACHING;SHARP;SHOOTING
DESCRIPTORS: ACHING;SHOOTING

## 2024-10-08 ASSESSMENT — PAIN DESCRIPTION - PAIN TYPE
TYPE: ACUTE PAIN

## 2024-10-08 ASSESSMENT — PAIN SCALES - WONG BAKER
WONGBAKER_NUMERICALRESPONSE: HURTS A LITTLE BIT
WONGBAKER_NUMERICALRESPONSE: NO HURT

## 2024-10-08 ASSESSMENT — PAIN DESCRIPTION - LOCATION
LOCATION: ABDOMEN
LOCATION: ABDOMEN;NECK
LOCATION: ABDOMEN
LOCATION: ABDOMEN
LOCATION: ABDOMEN;NECK
LOCATION: ABDOMEN;NECK;SHOULDER
LOCATION: ABDOMEN;NECK
LOCATION: NECK;ABDOMEN

## 2024-10-08 ASSESSMENT — PAIN DESCRIPTION - ORIENTATION
ORIENTATION: MID
ORIENTATION: LEFT

## 2024-10-08 ASSESSMENT — PAIN DESCRIPTION - FREQUENCY
FREQUENCY: CONTINUOUS

## 2024-10-08 ASSESSMENT — PAIN - FUNCTIONAL ASSESSMENT
PAIN_FUNCTIONAL_ASSESSMENT: ACTIVITIES ARE NOT PREVENTED

## 2024-10-08 NOTE — PROGRESS NOTES
[facility]   PROGRESS NOTE      Patient: Dixon Conn  Room #: 6K-27/027-A            YOB: 1990  Age: 34 y.o.  Gender: female            Admit Date & Time: 10/3/2024  6:10 PM    Assessment:    The patient welcomed a visit to discuss her hospital stays.    Interventions:  The patient was provided personalized prayer.    Outcomes:  The patient was thankful for the provided prayer.     Plan:  1.Spiritual care will continue to follow the patient according to [facility] spiritual care SOP.       Electronically signed by Chaplain Linh, on 10/8/2024 at 10:50 AM.  Spiritual Care Department  [facility]  [phone]     10/08/24 1049   Encounter Summary   Encounter Overview/Reason Initial Encounter   Service Provided For Patient   [facility]   Support System Spouse;Children;Family members   Last Encounter  10/08/24   Complexity of Encounter Moderate   Begin Time 1030   End Time  1045   Total Time Calculated 15 min   Spiritual/Emotional needs   Type Spiritual Support   Assessment/Intervention/Outcome   Assessment Calm;Coping;Concerns with suffering   Intervention Active listening;Discussed belief system/Scientology practices/neel;Discussed illness injury and it’s impact;Prayer (assurance of)/Okarche   Outcome Comfort;Encouraged;Engaged in conversation;Expressed Gratitude

## 2024-10-08 NOTE — PROGRESS NOTES
Pt sobbing and writhing in bed. States she is nauseous, having an anxiety attack, and \"can't take the pain anymore.\" States she is considering leaving AMA. REKHA Aguila discussed with pt; states she does not want it. RN notified.

## 2024-10-08 NOTE — CARE COORDINATION
10/8/24, 11:45 AM EDT    DISCHARGE ON GOING EVALUATION    [facility] day: 5  Location: [facility]27/027A Reason for admit: Pancreatitis, unspecified pancreatitis type [K85.90]     Procedures: none    Imaging since last note: none    Barriers to Discharge: Await bed at [facility]. Pureed diet. Bentyl. Zenep. IV protonix. Bactrim. PRN morphine and antiemetics.     PCP: Schoen, Ashley, PA  Readmission Risk Score: 11.6    Patient Goals/Plan/Treatment Preferences: Plan [facility] when bed available. From home with .

## 2024-10-08 NOTE — PLAN OF CARE
Problem: Infection - Adult  Goal: Absence of infection during hospitalization  Outcome: Progressing  Flowsheets (Taken 10/8/2024 0742)  Absence of infection during hospitalization:   Assess and monitor for signs and symptoms of infection   Monitor lab/diagnostic results   Administer medications as ordered   Instruct and encourage patient and family to use good hand hygiene technique   Identify and instruct in appropriate isolation precautions for identified infection/condition       Problem: Gastrointestinal - Adult  Goal: Minimal or absence of nausea and vomiting  Outcome: Progressing  Flowsheets (Taken 10/8/2024 0742)  Minimal or absence of nausea and vomiting:   Administer ordered antiemetic medications as needed   Provide nonpharmacologic comfort measures as appropriate   Advance diet as tolerated, if ordered       Problem: Pain  Goal: Verbalizes/displays adequate comfort level or baseline comfort level  Outcome: Progressing  Flowsheets (Taken 10/8/2024 0742)  Verbalizes/displays adequate comfort level or baseline comfort level:   Encourage patient to monitor pain and request assistance   Assess pain using appropriate pain scale   Administer analgesics based on type and severity of pain and evaluate response   Implement non-pharmacological measures as appropriate and evaluate response       Problem: Discharge Planning  Goal: Discharge to home or other facility with appropriate resources  Outcome: Progressing  Flowsheets (Taken 10/8/2024 0742)  Discharge to home or other facility with appropriate resources:   Identify barriers to discharge with patient and caregiver   Arrange for needed discharge resources and transportation as appropriate   Identify discharge learning needs (meds, wound care, etc)

## 2024-10-08 NOTE — PROGRESS NOTES
Hospitalist Progress Note      Patient:  Dixon Conn 34 y.o. 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 GI has followed and optimized pancreatic enzymes and diet, PPI, bentyl; they recommend transfer to tertiary center for additional specialize support. Initial contact made with [facility] over the weekend, but [facility] requested we re-contact on 10/7/24 for a pancreatic specialist to be the accepting physician. Await word from [facility] on location ([facility] vs [facility]) depending on whether specialist travels to [facility]. If the former, anticipate discharge later today, otherwise would be tomorrow at the earliest. In the meantime we will continue all medications as currently ordered.  10/8/24: continue medications as ordered, pending transfer to [facility]  Leukopenia with neutropenia- with neutropenia ANC < 500, on neutropenic precautions. There is high suspicion that the high dose IV benadryl is contributing to the drop, so it is held at this time. Will recheck CBC with diff on 10/8 - if level rises enough we can consider restarting but trying for less frequent doses (pt notes dose threshold dependence - not effective at 25mg); main symptomatic complaint necessitating the usage is nausea rather than pruritus. Pt confirms reaction to Fentanyl, so we will not change the opiate at this time since it appears to be the best tolerated. Anticipate [facility] specialists to follow up on this active problem to either further confirm or refute the possible diagnosis.  10/8/24: , still too low to consider doses of IV benadryl at this time, recheck CBC c diff in the AM  L Trapezius myositis - minor skin breakdown from excoration so on Bactrim at this time; imaging done confirms no  date:  pending placement  Disposition: [facility]     ===================================================================    Chief Complaint: Abdominal pain, headache  Subjective (past 24 hours): Cites shoulder pain now involving her right side as well; has not eaten well today, pain is not improved      HPI / Hospital Course:  Dixon Conn is a 34 y.o. female with PMHx of Chronic pancreatitis, Dysautonomia who presented to [facility] with chief complaint of headache and abdominal pain. Patient arrived as a direct admission from [facility] Patient reports that she felt well for about 2 days after discharge (on 9/27). States she went out to eat with some friends for her birthday and ate some sushi and thinks it may have caused another flare up. Reports epigastric pain radiating into her back. Reports associated nausea, vomiting and diarrhea. Reports a headache currently. Also states she is very anxious and itchy. Otherwise denies symptoms. Denies dizziness, or lightheadedness. Denies chest pain or SOB. Denies dysuria or hematuria.      Pt managed on IVF and IV Pain control since admission with a tapering dose  Pt noted to have myositis on left trapezius without abscess  GI recommends transfer to tertiary center for pancreatitis specialist    Medications:    Infusion Medications    sodium chloride      Scheduled Medications    sulfamethoxazole-trimethoprim  1 tablet Oral 2 times per day    busPIRone  10 mg Oral TID    lipase-protease-amylase  80,000 Units Oral TID WC    dicyclomine  20 mg Oral 4x Daily AC & HS    acetaminophen  650 mg Oral 4 times per day    pantoprazole  40 mg IntraVENous BID    enoxaparin  40 mg SubCUTAneous Daily    sodium chloride flush  5-40 mL IntraVENous 2 times per day    PRN Meds: morphine **OR** morphine, hydrOXYzine, prochlorperazine, lipase-protease-amylase, sodium chloride flush, sodium chloride, [DISCONTINUED] potassium chloride **OR** potassium chloride, magnesium sulfate, ondansetron

## 2024-10-08 NOTE — PROGRESS NOTES
1.  DM Type II (Diet Controlled) without sign of diabetic retinopathy and no blot heme on dilated retinal examination today OU No Macular Edema:  Discussed the pathophysiology of diabetes and its effect on the eye and risk of blindness. Stressed the importance of strong glucose control. Advised of importance of at least yearly dilated examinations but to contact us immediately for any problems or concerns. 2. FIONA w/ PEK OU - Continue using Systane TID OU routinely. PT discontinued Restasis 2 years ago. 3.  Pseudophakia OU - (Mitrev) doing well. 4. PCO  OD (Posterior Capsule Opacification)  Observe and consider yag cap when pt feels pco visually significant and visual acuity decreases to appropriate level. Patient defers the refraction at today's visit    Letter to PCP. Return for an appointment in 1 year for 30 with Dr. Ernesto Falk. Pt ambulated in hallway; multiple laps around unit. Strong and steady gait. Pt states \"aching in hips\" relieved by ambulation, but pain in abdomen and L lateral neck area is worse. RN notified. Pt returned to room, standard safety measures in place. VSS, no change from morning assessment.

## 2024-10-08 NOTE — PROGRESS NOTES
Pt resting peacefully in room; lights dimmed. Voices desire to transfer to [facility] \"ASAP\". RN notified; pt informed that transfer to occur as soon as a bed becomes available. No other needs or concerns voiced at this time. Standard safety measures in place. Hourly, purposeful rounding to continue.

## 2024-10-08 NOTE — PROGRESS NOTES
Gastroenterology Progress Note:     Patient Name:  Dixon Conn   MRN: [id number]  [id number]  YOB: 1990  Admit Date: 10/3/2024  6:10 PM  Primary Care Physician: Schoen, Ashley, PA   6K-27/027-A     Patient seen and examined.  24 hours events and chart reviewed.    Subjective:   Tolerating diet a little better today with pureed  Reviewed records received from [place]  Awaiting [facility] bed  Discussed plans    Objective:  /61   Pulse 68   Temp 98 °F (36.7 °C) (Oral)   Resp 14   Ht 1.702 m (5' 7.01\")   Wt 63.8 kg (140 lb 10.5 oz)   LMP 09/04/2024 (Within Days)   SpO2 99%   BMI 22.02 kg/m²     Review of Systems - Gastrointestinal ROS: positive for - abdominal pain, diarrhea    Physical Exam:    General:  Nourished, emotional  HEENT: Atraumatic, normocephalic. Moist oral mucous membranes.  Neck: Supple without adenopathy, JVD, thyromegaly or masses. Trachea midline.  CV: Heart RRR, no murmurs, rubs, gallops.  Resp: Even, easy without cough or accessory use. Lungs clear to ascultation bilaterally.   Abd: Round, soft, generally tender. No hepatosplenomegaly or mass present. Active bowel sounds heard. No distention noted.   Ext:  Without cyanosis, clubbing, edema.   Skin: Pink, warm, dry  Neuro:  Alert, oriented x 3 with no obvious deficits.       Rectal: deferred    Labs:   CBC:   Lab Results   Component Value Date/Time    WBC 2.5 10/08/2024 05:52 AM    HGB 10.8 10/08/2024 05:52 AM    HCT 34.5 10/08/2024 05:52 AM    MCV 85.6 10/08/2024 05:52 AM     10/08/2024 05:52 AM     BMP:   Lab Results   Component Value Date/Time     10/08/2024 05:52 AM    K 4.0 10/08/2024 05:52 AM    K 3.5 10/04/2024 07:50 AM     10/08/2024 05:52 AM    CO2 23 10/08/2024 05:52 AM    BUN 10 10/08/2024 05:52 AM    CREATININE 0.5 10/08/2024 05:52 AM    CALCIUM 8.6 10/08/2024 05:52 AM     PT/INR:   Lab Results   Component Value Date/Time    INR 1.01 10/04/2024 07:50 AM     Lipids:   Lab Results   Component Value

## 2024-10-08 NOTE — PROGRESS NOTES
Pt is a 33 YO F, appears stated age, A&O x4, speech is appropriate and clear. PERRLA. Mucous membranes and conjunctiva moist. Pt states neck is \"sore\"; edema and scabbing noted in the L supraclavicular area. Arm drift negative, hand grasp strong and equal, pedal push/pull strong and equal, active ROM in all extremities. Radial, dorsalis pedis, and posterior tibialis pulses +2 with regular rhythm and equal throughout. Capillary refill < 3 seconds x4. Trace, non-pitting edema of the lower extremities. Scattered, skin-colored lesions noted on upper extremities. S1, S2 auscultated. Lung sounds clear throughout. Symmetrical chest movement, moderate depth of respirations, unlabored. Abdomen is rounded, soft, and tender throughout. Active bowel sounds in all four quadrants.

## 2024-10-09 LAB
ANION GAP SERPL CALC-SCNC: 14 MEQ/L (ref 8–16)
ANISOCYTOSIS BLD QL SMEAR: PRESENT
BASOPHILS ABSOLUTE: 0 THOU/MM3 (ref 0–0.1)
BASOPHILS NFR BLD AUTO: 0.7 %
BUN SERPL-MCNC: 9 MG/DL (ref 7–22)
CALCIUM SERPL-MCNC: 9.1 MG/DL (ref 8.5–10.5)
CHLORIDE SERPL-SCNC: 107 MEQ/L (ref 98–111)
CO2 SERPL-SCNC: 21 MEQ/L (ref 23–33)
CREAT SERPL-MCNC: 0.7 MG/DL (ref 0.4–1.2)
DEPRECATED RDW RBC AUTO: 46.3 FL (ref 35–45)
EOSINOPHIL NFR BLD AUTO: 2.8 %
EOSINOPHILS ABSOLUTE: 0.1 THOU/MM3 (ref 0–0.4)
ERYTHROCYTE [DISTWIDTH] IN BLOOD BY AUTOMATED COUNT: 15.2 % (ref 11.5–14.5)
GFR SERPL CREATININE-BSD FRML MDRD: > 90 ML/MIN/1.73M2
GLUCOSE SERPL-MCNC: 85 MG/DL (ref 70–108)
HCT VFR BLD AUTO: 35.9 % (ref 37–47)
HGB BLD-MCNC: 11.3 GM/DL (ref 12–16)
IMM GRANULOCYTES # BLD AUTO: 0.01 THOU/MM3 (ref 0–0.07)
IMM GRANULOCYTES NFR BLD AUTO: 0.4 %
LYMPHOCYTES ABSOLUTE: 1.3 THOU/MM3 (ref 1–4.8)
LYMPHOCYTES NFR BLD AUTO: 46.6 %
MCH RBC QN AUTO: 26.5 PG (ref 26–33)
MCHC RBC AUTO-ENTMCNC: 31.5 GM/DL (ref 32.2–35.5)
MCV RBC AUTO: 84.3 FL (ref 81–99)
MONOCYTES ABSOLUTE: 0.3 THOU/MM3 (ref 0.4–1.3)
MONOCYTES NFR BLD AUTO: 9.9 %
NEUTROPHILS ABSOLUTE: 1.1 THOU/MM3 (ref 1.8–7.7)
NEUTROPHILS NFR BLD AUTO: 39.6 %
NRBC BLD AUTO-RTO: 0 /100 WBC
PLATELET # BLD AUTO: 134 THOU/MM3 (ref 130–400)
PLATELET BLD QL SMEAR: ADEQUATE
PMV BLD AUTO: 12.7 FL (ref 9.4–12.4)
POIKILOCYTES: ABNORMAL
POTASSIUM SERPL-SCNC: 4.2 MEQ/L (ref 3.5–5.2)
RBC # BLD AUTO: 4.26 MILL/MM3 (ref 4.2–5.4)
SCAN OF BLOOD SMEAR: NORMAL
SODIUM SERPL-SCNC: 142 MEQ/L (ref 135–145)
WBC # BLD AUTO: 2.8 THOU/MM3 (ref 4.8–10.8)

## 2024-10-09 PROCEDURE — 83520 IMMUNOASSAY QUANT NOS NONAB: CPT

## 2024-10-09 PROCEDURE — 6360000002 HC RX W HCPCS: Performed by: STUDENT IN AN ORGANIZED HEALTH CARE EDUCATION/TRAINING PROGRAM

## 2024-10-09 PROCEDURE — 6360000002 HC RX W HCPCS

## 2024-10-09 PROCEDURE — 1200000000 HC SEMI PRIVATE

## 2024-10-09 PROCEDURE — 2580000003 HC RX 258

## 2024-10-09 PROCEDURE — 6370000000 HC RX 637 (ALT 250 FOR IP): Performed by: NURSE PRACTITIONER

## 2024-10-09 PROCEDURE — 6360000002 HC RX W HCPCS: Performed by: NURSE PRACTITIONER

## 2024-10-09 PROCEDURE — 83993 ASSAY FOR CALPROTECTIN FECAL: CPT

## 2024-10-09 PROCEDURE — 36415 COLL VENOUS BLD VENIPUNCTURE: CPT

## 2024-10-09 PROCEDURE — 6370000000 HC RX 637 (ALT 250 FOR IP): Performed by: STUDENT IN AN ORGANIZED HEALTH CARE EDUCATION/TRAINING PROGRAM

## 2024-10-09 PROCEDURE — 6370000000 HC RX 637 (ALT 250 FOR IP): Performed by: INTERNAL MEDICINE

## 2024-10-09 PROCEDURE — 99232 SBSQ HOSP IP/OBS MODERATE 35: CPT | Performed by: HOSPITALIST

## 2024-10-09 PROCEDURE — 82705 FATS/LIPIDS FECES QUAL: CPT

## 2024-10-09 PROCEDURE — 85025 COMPLETE CBC W/AUTO DIFF WBC: CPT

## 2024-10-09 PROCEDURE — 80048 BASIC METABOLIC PNL TOTAL CA: CPT

## 2024-10-09 RX ORDER — DICYCLOMINE HYDROCHLORIDE 10 MG/ML
20 INJECTION INTRAMUSCULAR 4 TIMES DAILY
Status: DISCONTINUED | OUTPATIENT
Start: 2024-10-09 | End: 2024-10-11 | Stop reason: HOSPADM

## 2024-10-09 RX ADMIN — PANTOPRAZOLE SODIUM 40 MG: 40 INJECTION, POWDER, FOR SOLUTION INTRAVENOUS at 20:47

## 2024-10-09 RX ADMIN — MORPHINE SULFATE 2 MG: 2 INJECTION, SOLUTION INTRAMUSCULAR; INTRAVENOUS at 06:12

## 2024-10-09 RX ADMIN — MORPHINE SULFATE 2 MG: 2 INJECTION, SOLUTION INTRAMUSCULAR; INTRAVENOUS at 10:12

## 2024-10-09 RX ADMIN — DICYCLOMINE HYDROCHLORIDE 20 MG: 10 INJECTION, SOLUTION INTRAMUSCULAR at 12:11

## 2024-10-09 RX ADMIN — MORPHINE SULFATE 2 MG: 2 INJECTION, SOLUTION INTRAMUSCULAR; INTRAVENOUS at 18:25

## 2024-10-09 RX ADMIN — DICYCLOMINE HYDROCHLORIDE 20 MG: 10 INJECTION, SOLUTION INTRAMUSCULAR at 17:28

## 2024-10-09 RX ADMIN — PANCRELIPASE LIPASE, PANCRELIPASE PROTEASE, PANCRELIPASE AMYLASE 80000 UNITS: 20000; 63000; 84000 CAPSULE, DELAYED RELEASE ORAL at 17:25

## 2024-10-09 RX ADMIN — ACETAMINOPHEN 650 MG: 325 TABLET ORAL at 23:19

## 2024-10-09 RX ADMIN — SODIUM CHLORIDE, PRESERVATIVE FREE 10 ML: 5 INJECTION INTRAVENOUS at 09:16

## 2024-10-09 RX ADMIN — SULFAMETHOXAZOLE AND TRIMETHOPRIM 1 TABLET: 800; 160 TABLET ORAL at 09:16

## 2024-10-09 RX ADMIN — SODIUM CHLORIDE, PRESERVATIVE FREE 10 ML: 5 INJECTION INTRAVENOUS at 20:46

## 2024-10-09 RX ADMIN — MORPHINE SULFATE 2 MG: 2 INJECTION, SOLUTION INTRAMUSCULAR; INTRAVENOUS at 22:24

## 2024-10-09 RX ADMIN — ACETAMINOPHEN 650 MG: 325 TABLET ORAL at 12:05

## 2024-10-09 RX ADMIN — ENOXAPARIN SODIUM 40 MG: 100 INJECTION SUBCUTANEOUS at 09:16

## 2024-10-09 RX ADMIN — BUSPIRONE HYDROCHLORIDE 10 MG: 10 TABLET ORAL at 13:24

## 2024-10-09 RX ADMIN — BUSPIRONE HYDROCHLORIDE 10 MG: 10 TABLET ORAL at 20:47

## 2024-10-09 RX ADMIN — PANCRELIPASE LIPASE, PANCRELIPASE PROTEASE, PANCRELIPASE AMYLASE 80000 UNITS: 20000; 63000; 84000 CAPSULE, DELAYED RELEASE ORAL at 12:06

## 2024-10-09 RX ADMIN — SULFAMETHOXAZOLE AND TRIMETHOPRIM 1 TABLET: 800; 160 TABLET ORAL at 20:47

## 2024-10-09 RX ADMIN — MORPHINE SULFATE 2 MG: 2 INJECTION, SOLUTION INTRAMUSCULAR; INTRAVENOUS at 14:14

## 2024-10-09 RX ADMIN — PANTOPRAZOLE SODIUM 40 MG: 40 INJECTION, POWDER, FOR SOLUTION INTRAVENOUS at 09:18

## 2024-10-09 RX ADMIN — ACETAMINOPHEN 650 MG: 325 TABLET ORAL at 17:25

## 2024-10-09 ASSESSMENT — PAIN DESCRIPTION - LOCATION
LOCATION: ABDOMEN
LOCATION: NECK;ABDOMEN
LOCATION: ABDOMEN

## 2024-10-09 ASSESSMENT — PAIN DESCRIPTION - ORIENTATION
ORIENTATION: POSTERIOR
ORIENTATION: ANTERIOR
ORIENTATION: ANTERIOR
ORIENTATION: UPPER
ORIENTATION: MID

## 2024-10-09 ASSESSMENT — PAIN SCALES - GENERAL
PAINLEVEL_OUTOF10: 7
PAINLEVEL_OUTOF10: 7
PAINLEVEL_OUTOF10: 8
PAINLEVEL_OUTOF10: 7
PAINLEVEL_OUTOF10: 9
PAINLEVEL_OUTOF10: 6
PAINLEVEL_OUTOF10: 7

## 2024-10-09 ASSESSMENT — PAIN DESCRIPTION - DESCRIPTORS
DESCRIPTORS: DISCOMFORT
DESCRIPTORS: SHARP
DESCRIPTORS: ACHING;BURNING
DESCRIPTORS: SHARP
DESCRIPTORS: SHARP;PRESSURE
DESCRIPTORS: SHARP
DESCRIPTORS: SHARP;PRESSURE

## 2024-10-09 ASSESSMENT — PAIN - FUNCTIONAL ASSESSMENT
PAIN_FUNCTIONAL_ASSESSMENT: ACTIVITIES ARE NOT PREVENTED

## 2024-10-09 ASSESSMENT — PAIN DESCRIPTION - ONSET: ONSET: ON-GOING

## 2024-10-09 ASSESSMENT — PAIN DESCRIPTION - PAIN TYPE: TYPE: ACUTE PAIN;CHRONIC PAIN

## 2024-10-09 ASSESSMENT — PAIN DESCRIPTION - FREQUENCY: FREQUENCY: CONTINUOUS

## 2024-10-09 NOTE — PROGRESS NOTES
Patient took a bath and ambulated, reported to control her anxiety better when walking. Pain is still reported and gets better with medication. Primary nurse report off.    Jamila Medrano Ch. Nurse student, [employer].

## 2024-10-09 NOTE — PLAN OF CARE
Problem: Discharge Planning  Goal: Discharge to home or other facility with appropriate resources  Flowsheets (Taken 10/9/2024 0104)  Discharge to home or other facility with appropriate resources:   Identify discharge learning needs (meds, wound care, etc)   Identify barriers to discharge with patient and caregiver   Refer to discharge planning if patient needs post-hospital services based on physician order or complex needs related to functional status, cognitive ability or social support system     Problem: Risk for Elopement  Goal: Patient will not exit the unit/facility without proper excort  Flowsheets (Taken 10/9/2024 0104)  Nursing Interventions for Elopement Risk:   Collaborate with treatment team for nicotine replacement   Collaborate with family members/caregivers to mitigate the elopement risk     Problem: Pain  Goal: Verbalizes/displays adequate comfort level or baseline comfort level  Flowsheets (Taken 10/9/2024 0104)  Verbalizes/displays adequate comfort level or baseline comfort level:   Consider cultural and social influences on pain and pain management   Administer analgesics based on type and severity of pain and evaluate response   Encourage patient to monitor pain and request assistance

## 2024-10-09 NOTE — PROGRESS NOTES
Hospitalist Progress Note      Patient:  Dixon Conn 34 y.o. 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 GI has followed and optimized pancreatic enzymes and diet, PPI, bentyl; they recommend transfer to tertiary center for additional specialize support. [facility] contact [facility] with [facility] over [facility] weekend, but [facility] requested we re-contact on 10/7/24 for a pancreatic specialist to be the accepting physician. Await word from [facility] on location ([place] [facility]) depending on whether specialist travels to [facility]. If the former, anticipate discharge later today, otherwise would be tomorrow at the earliest. In the meantime we will continue all medications as currently ordered.  10/8/24: continue medications as ordered, pending transfer to [facility]  10/9/2024: Patient has been accepted at [facility], reviewing bed availability again later this afternoon.  Okay to discharge to [facility] when bed becomes available.  Leukopenia with neutropenia- with neutropenia ANC < 500, on neutropenic precautions. There is high suspicion that the high dose IV benadryl is contributing to the drop, so it is held at this time. Will recheck CBC with diff on 10/8 - if level rises enough we can consider restarting but trying for less frequent doses (pt notes dose threshold dependence - not effective at 25mg); main symptomatic complaint necessitating the usage is nausea rather than pruritus. Pt confirms reaction to Fentanyl, so we will not change the opiate at this time since it appears to be the best tolerated. Anticipate [facility] specialists to follow up on this active problem to either further confirm or refute the possible diagnosis.  10/8/24: , still too low to consider doses of IV benadryl at  Status: [x]Inpatient / []Observation  Telemetry: []Yes / [x]No  PT/OT: []Yes / [x]No    DVT Prophylaxis: [x] Lovenox / [] Heparin / [] SCDs / [] Already on Systemic Anticoagulation / [] None   Code status: Full Code     Expected discharge date:  pending placement  Disposition: [facility]     ===================================================================    Chief Complaint: Abdominal pain, headache  Subjective (past 24 hours):   Patient seen and examined at bedside, awaiting bed at [facility].    No acute overnight events.  Tray at bedside.    HPI / Hospital Course:  Dixon Conn is a 34 y.o. female with PMHx of Chronic pancreatitis, Dysautonomia who presented to [facility] with chief complaint of headache and abdominal pain. Patient arrived as a direct admission from [facility]. Patient reports that she felt well for about 2 days after discharge (on 9/27). States she went out to eat with some friends for her birthday and ate some sushi and thinks it may have caused another flare up. Reports epigastric pain radiating into her back. Reports associated nausea, vomiting and diarrhea. Reports a headache currently. Also states she is very anxious and itchy. Otherwise denies symptoms. Denies dizziness, or lightheadedness. Denies chest pain or SOB. Denies dysuria or hematuria.      Pt managed on IVF and IV Pain control since admission with a tapering dose  Pt noted to have myositis on left trapezius without abscess  GI recommends transfer to tertiary center for pancreatitis specialist    Medications:    Infusion Medications    sodium chloride      Scheduled Medications    dicyclomine  20 mg IntraMUSCular 4x Daily    sulfamethoxazole-trimethoprim  1 tablet Oral 2 times per day    busPIRone  10 mg Oral TID    lipase-protease-amylase  80,000 Units Oral TID WC    acetaminophen  650 mg Oral 4 times per day    pantoprazole  40 mg IntraVENous BID    enoxaparin  40 mg SubCUTAneous Daily    sodium chloride flush  5-40 mL IntraVENous 2

## 2024-10-09 NOTE — CARE COORDINATION
10/9/24, 2:41 PM EDT    DISCHARGE ON GOING EVALUATION    [facility] day: 6  Location: [facility]27/027-A Reason for admit: Pancreatitis, unspecified pancreatitis type [K85.90]     Procedures: none    Imaging since last note: none    Barriers to Discharge: Hospitalist and GI following. IM bentyl 4x/day. SQ lovenox. Zenpep. IV morphine prn. IV protonix. IV compazine prn.     PCP: Schoen, Ashley, PA  Readmission Risk Score: 12.8    Patient Goals/Plan/Treatment Preferences: From home w/ . Accepted to [facility], awaiting bed assignment.

## 2024-10-09 NOTE — PROGRESS NOTES
Lipids:   Lab Results   Component Value Date/Time    ALKPHOS 90 10/07/2024 08:15 AM    ALT 37 10/07/2024 08:15 AM    AST 38 10/07/2024 08:15 AM    BILITOT 0.2 10/07/2024 08:15 AM    LIPASE 18.9 10/04/2024 07:50 AM     Significant Diagnostic Studies: None reviewed today    Current Meds:  Scheduled Meds:   dicyclomine  20 mg IntraMUSCular 4x Daily    sulfamethoxazole-trimethoprim  1 tablet Oral 2 times per day    busPIRone  10 mg Oral TID    lipase-protease-amylase  80,000 Units Oral TID WC    acetaminophen  650 mg Oral 4 times per day    pantoprazole  40 mg IntraVENous BID    enoxaparin  40 mg SubCUTAneous Daily    sodium chloride flush  5-40 mL IntraVENous 2 times per day     Continuous Infusions:   sodium chloride         Assessment:  34 y.o. female with PMH of pancreatitis, adrenal cortical insufficiency, pituitary tumor, avascular necrosis, ulcerative colitis, and kidney stones who we are asked to see/evaluate by Behl, Ashita, MD for medical management of acute on chronic pancreatitis. She has a history of autoimmune pancreatitis and was previously seen in [place]. She has had several celiac plexus blocks in the past. Patient presented to [facility] 2 days after recent discharge with reports of abdominal pain radiating to back, N/V and Diarrhea. She stated she went out to eat with some friends for her birthday, did not have pancreatic enzymes with her and ate sushi, and thinks this may have caused another flare up. She has been seen at  in the past and established care. She takes Creon for chronic pancreatitis/diarrhea management. She had a celiac plexus block completed on 9/12/24 with Dr. Vergara. Denies CP, SOB, hematochezia or hematemesis. CT A/P done at outside facility demonstrated diffusely atrophic pancreas, mild heterogeneity around the pancreatic head and uncinate process, possible acute pancreatitis. Lipase 18.9     TODAY:   Continued pain, had formed BM this am. Discussed planning moving  completed at the more advanced level of care to control her pain more effectively  Tolerating low fat diet, has been doing a little better with pureed  Increased pancreatic enzymes dosing, states taking 3x36,000 units Creon at home. Ordered for 80,000 units here, no diarrhea currently. We discussed pancreatic enzymes at all meals and snacks is necessary  Check pancreatic enzymes and fecal fats to determine if enzymes are still low in stool despite replacement, weight based max dose is around 150,000 units per meal, will also add calprotectin to evaluate for inflammatory reasoning for diarrhea - cancelled calprotectin done during last hospitalization on 9/12/2024 and was normal at 18.   Previously discussed Bentyl, Hyoscyamine, Hyoscyamine SL - pt states make her sick/diarrhea on prior attempts. States she tried this for a few months without improvement. Willing to try IM Bentyl for functional abdominal pain.   Outpatient colonoscopy for random biopsies for diarrhea and anemia, no urgent need at this time.   GI signing off at this time, not much more to offer.   If transferred out, make office follow up in 4-6 weeks post discharge.       Case reviewed and impression/plan reviewed in collaboration with Dr. Porter    Electronically signed by OZZIE Suggs CNP on 10/9/2024 at 10:27 AM    [facility]

## 2024-10-09 NOTE — PROGRESS NOTES
Patient in bed, sleepy, alert and oriented *4. Speech clear. Mucous membrane moist, pink and intact. Gag reflex present. Hearing intact, Vision intact. Pupils 3 round and brisk, sclera white. Teeth intact. No JVD noted. Regular respiration pattern, symmetrical chest movement, unlabored, breath sounds are clear. Heart sounds regular S1S2. Bowel sounds active in all 4 quadrants, abdomen soft, no pain, mass or discomfort on palpation. Skin temperature warm, appropriate to ethnicity and dry. No edema present. Patient ambulates independently. Capillary refill <3 sec, skin turgor < 3 sec. Hand grasp strong, pedal push and pull strong bilt. Active range of motion in all extremities, full sensation. IV access on right upper arm, clean, dry and intact, transparent dressing. No signs of infiltration or infection. Patient reports pain on the neck and abdomen, sharp, does not get better with activity. 2 side rails up, call bell within reach, will continue to observe.      Jamila Medrano Ch. Student nurse, [facility]

## 2024-10-10 LAB
BASOPHILS ABSOLUTE: 0 THOU/MM3 (ref 0–0.1)
BASOPHILS NFR BLD AUTO: 0.3 %
DEPRECATED RDW RBC AUTO: 52.2 FL (ref 35–45)
EOSINOPHIL NFR BLD AUTO: 0.9 %
EOSINOPHILS ABSOLUTE: 0 THOU/MM3 (ref 0–0.4)
ERYTHROCYTE [DISTWIDTH] IN BLOOD BY AUTOMATED COUNT: 15.4 % (ref 11.5–14.5)
FECAL FAT, QUALITATIVE: NORMAL
HCT VFR BLD AUTO: 43.8 % (ref 37–47)
HGB BLD-MCNC: 12.8 GM/DL (ref 12–16)
IMM GRANULOCYTES # BLD AUTO: 0.01 THOU/MM3 (ref 0–0.07)
IMM GRANULOCYTES NFR BLD AUTO: 0.3 %
LYMPHOCYTES ABSOLUTE: 1.4 THOU/MM3 (ref 1–4.8)
LYMPHOCYTES NFR BLD AUTO: 43.4 %
MCH RBC QN AUTO: 26.8 PG (ref 26–33)
MCHC RBC AUTO-ENTMCNC: 29.2 GM/DL (ref 32.2–35.5)
MCV RBC AUTO: 91.8 FL (ref 81–99)
MONOCYTES ABSOLUTE: 0.2 THOU/MM3 (ref 0.4–1.3)
MONOCYTES NFR BLD AUTO: 6.6 %
NEUTROPHILS ABSOLUTE: 1.6 THOU/MM3 (ref 1.8–7.7)
NEUTROPHILS NFR BLD AUTO: 48.5 %
NRBC BLD AUTO-RTO: 0 /100 WBC
PLATELET # BLD AUTO: 132 THOU/MM3 (ref 130–400)
PMV BLD AUTO: 12.1 FL (ref 9.4–12.4)
RBC # BLD AUTO: 4.77 MILL/MM3 (ref 4.2–5.4)
REASON FOR REJECTION: NORMAL
REJECTED TEST: NORMAL
WBC # BLD AUTO: 3.2 THOU/MM3 (ref 4.8–10.8)

## 2024-10-10 PROCEDURE — 1200000000 HC SEMI PRIVATE

## 2024-10-10 PROCEDURE — 6360000002 HC RX W HCPCS: Performed by: STUDENT IN AN ORGANIZED HEALTH CARE EDUCATION/TRAINING PROGRAM

## 2024-10-10 PROCEDURE — 2580000003 HC RX 258

## 2024-10-10 PROCEDURE — 36415 COLL VENOUS BLD VENIPUNCTURE: CPT

## 2024-10-10 PROCEDURE — 85025 COMPLETE CBC W/AUTO DIFF WBC: CPT

## 2024-10-10 PROCEDURE — 99232 SBSQ HOSP IP/OBS MODERATE 35: CPT | Performed by: HOSPITALIST

## 2024-10-10 PROCEDURE — 6370000000 HC RX 637 (ALT 250 FOR IP): Performed by: HOSPITALIST

## 2024-10-10 PROCEDURE — 6360000002 HC RX W HCPCS

## 2024-10-10 PROCEDURE — 94761 N-INVAS EAR/PLS OXIMETRY MLT: CPT

## 2024-10-10 PROCEDURE — 6370000000 HC RX 637 (ALT 250 FOR IP): Performed by: INTERNAL MEDICINE

## 2024-10-10 PROCEDURE — 6370000000 HC RX 637 (ALT 250 FOR IP): Performed by: NURSE PRACTITIONER

## 2024-10-10 PROCEDURE — 6370000000 HC RX 637 (ALT 250 FOR IP): Performed by: STUDENT IN AN ORGANIZED HEALTH CARE EDUCATION/TRAINING PROGRAM

## 2024-10-10 RX ORDER — LOPERAMIDE HCL 2 MG
2 CAPSULE ORAL 4 TIMES DAILY PRN
Status: DISCONTINUED | OUTPATIENT
Start: 2024-10-10 | End: 2024-10-11 | Stop reason: HOSPADM

## 2024-10-10 RX ADMIN — PANTOPRAZOLE SODIUM 40 MG: 40 INJECTION, POWDER, FOR SOLUTION INTRAVENOUS at 20:44

## 2024-10-10 RX ADMIN — PANCRELIPASE LIPASE, PANCRELIPASE PROTEASE, PANCRELIPASE AMYLASE 80000 UNITS: 20000; 63000; 84000 CAPSULE, DELAYED RELEASE ORAL at 08:41

## 2024-10-10 RX ADMIN — PANCRELIPASE LIPASE, PANCRELIPASE PROTEASE, PANCRELIPASE AMYLASE 80000 UNITS: 20000; 63000; 84000 CAPSULE, DELAYED RELEASE ORAL at 16:16

## 2024-10-10 RX ADMIN — PANTOPRAZOLE SODIUM 40 MG: 40 INJECTION, POWDER, FOR SOLUTION INTRAVENOUS at 08:41

## 2024-10-10 RX ADMIN — LOPERAMIDE HYDROCHLORIDE 2 MG: 2 CAPSULE ORAL at 16:16

## 2024-10-10 RX ADMIN — SODIUM CHLORIDE, PRESERVATIVE FREE 10 ML: 5 INJECTION INTRAVENOUS at 20:42

## 2024-10-10 RX ADMIN — BUSPIRONE HYDROCHLORIDE 10 MG: 10 TABLET ORAL at 08:41

## 2024-10-10 RX ADMIN — MORPHINE SULFATE 2 MG: 2 INJECTION, SOLUTION INTRAMUSCULAR; INTRAVENOUS at 06:36

## 2024-10-10 RX ADMIN — ENOXAPARIN SODIUM 40 MG: 100 INJECTION SUBCUTANEOUS at 08:41

## 2024-10-10 RX ADMIN — MORPHINE SULFATE 2 MG: 2 INJECTION, SOLUTION INTRAMUSCULAR; INTRAVENOUS at 22:31

## 2024-10-10 RX ADMIN — ACETAMINOPHEN 650 MG: 325 TABLET ORAL at 23:25

## 2024-10-10 RX ADMIN — ACETAMINOPHEN 650 MG: 325 TABLET ORAL at 06:35

## 2024-10-10 RX ADMIN — MORPHINE SULFATE 2 MG: 2 INJECTION, SOLUTION INTRAMUSCULAR; INTRAVENOUS at 02:24

## 2024-10-10 RX ADMIN — BUSPIRONE HYDROCHLORIDE 10 MG: 10 TABLET ORAL at 20:41

## 2024-10-10 RX ADMIN — MORPHINE SULFATE 2 MG: 2 INJECTION, SOLUTION INTRAMUSCULAR; INTRAVENOUS at 14:35

## 2024-10-10 RX ADMIN — MORPHINE SULFATE 2 MG: 2 INJECTION, SOLUTION INTRAMUSCULAR; INTRAVENOUS at 10:35

## 2024-10-10 RX ADMIN — BUSPIRONE HYDROCHLORIDE 10 MG: 10 TABLET ORAL at 13:40

## 2024-10-10 RX ADMIN — ACETAMINOPHEN 650 MG: 325 TABLET ORAL at 17:46

## 2024-10-10 RX ADMIN — SULFAMETHOXAZOLE AND TRIMETHOPRIM 1 TABLET: 800; 160 TABLET ORAL at 20:41

## 2024-10-10 RX ADMIN — ACETAMINOPHEN 650 MG: 325 TABLET ORAL at 13:39

## 2024-10-10 RX ADMIN — SODIUM CHLORIDE, PRESERVATIVE FREE 10 ML: 5 INJECTION INTRAVENOUS at 08:42

## 2024-10-10 RX ADMIN — SULFAMETHOXAZOLE AND TRIMETHOPRIM 1 TABLET: 800; 160 TABLET ORAL at 08:41

## 2024-10-10 RX ADMIN — PANCRELIPASE LIPASE, PANCRELIPASE PROTEASE, PANCRELIPASE AMYLASE 80000 UNITS: 20000; 63000; 84000 CAPSULE, DELAYED RELEASE ORAL at 13:40

## 2024-10-10 RX ADMIN — MORPHINE SULFATE 2 MG: 2 INJECTION, SOLUTION INTRAMUSCULAR; INTRAVENOUS at 18:38

## 2024-10-10 ASSESSMENT — PAIN DESCRIPTION - DESCRIPTORS
DESCRIPTORS: SHARP
DESCRIPTORS: DISCOMFORT;SHARP
DESCRIPTORS: DISCOMFORT;SHARP
DESCRIPTORS: SHARP
DESCRIPTORS: SHARP
DESCRIPTORS: DISCOMFORT;SHARP

## 2024-10-10 ASSESSMENT — PAIN - FUNCTIONAL ASSESSMENT
PAIN_FUNCTIONAL_ASSESSMENT: ACTIVITIES ARE NOT PREVENTED
PAIN_FUNCTIONAL_ASSESSMENT: PREVENTS OR INTERFERES SOME ACTIVE ACTIVITIES AND ADLS
PAIN_FUNCTIONAL_ASSESSMENT: ACTIVITIES ARE NOT PREVENTED

## 2024-10-10 ASSESSMENT — PAIN SCALES - GENERAL
PAINLEVEL_OUTOF10: 7
PAINLEVEL_OUTOF10: 8
PAINLEVEL_OUTOF10: 9
PAINLEVEL_OUTOF10: 8

## 2024-10-10 ASSESSMENT — PAIN DESCRIPTION - ONSET
ONSET: ON-GOING

## 2024-10-10 ASSESSMENT — PAIN DESCRIPTION - LOCATION
LOCATION: ABDOMEN

## 2024-10-10 ASSESSMENT — PAIN DESCRIPTION - FREQUENCY
FREQUENCY: CONTINUOUS

## 2024-10-10 ASSESSMENT — PAIN DESCRIPTION - PAIN TYPE
TYPE: ACUTE PAIN;CHRONIC PAIN

## 2024-10-10 ASSESSMENT — PAIN DESCRIPTION - ORIENTATION
ORIENTATION: LEFT;UPPER
ORIENTATION: UPPER;LEFT
ORIENTATION: UPPER

## 2024-10-10 NOTE — PLAN OF CARE
Problem: Discharge Planning  Goal: Discharge to home or other facility with appropriate resources  10/10/2024 1403 by Marybeth Prather, RN  Outcome: Progressing     Problem: Risk for Elopement  Goal: Patient will not exit the unit/facility without proper excort  Outcome: Progressing     Problem: Pain  Goal: Verbalizes/displays adequate comfort level or baseline comfort level  10/10/2024 1403 by Marybeth Prather RN  Outcome: Progressing     Problem: Nutrition Deficit:  Goal: Optimize nutritional status  Outcome: Progressing     Problem: Gastrointestinal - Adult  Goal: Minimal or absence of nausea and vomiting  10/10/2024 1403 by Marybeth Prather, RN  Outcome: Progressing     Problem: Infection - Adult  Goal: Absence of infection during hospitalization  10/10/2024 1403 by Marybeth Prather, RN  Outcome: Progressing

## 2024-10-10 NOTE — PROGRESS NOTES
Comprehensive Nutrition Assessment    Type and Reason for Visit:  Reassess (po/supplement)    Nutrition Recommendations/Plan:   Consider MVI if patient able to tolerate with current GI issues.  ONS changed: Ensure Low Calorie, High Protein TID (pt's mom requested low sugar ONS) of note patient previously declined all shake type of ONS.  She never tried Ensure Clear.    Continue current diet, tolerating diet texture upgrade.    Pancreatitis/low fat diet reinforced 10/7/24 (previous education provided 9/13/24 on previous admit), handouts given.  Discussed using caution with high fiber, spicy, acidic, caffeine containing food/beverages due to continued reports of abdominal pain, nausea, diarrhea.   Note plan to transfer to [facility], awaiting bed.      Malnutrition Assessment:  Malnutrition Status:  Moderate malnutrition (10/07/24 1227)    Context:  Chronic Illness     Findings of the 6 clinical characteristics of malnutrition:  Energy Intake:  75% or less estimated energy requirements for 1 month or longer  Weight Loss:  No significant weight loss     Body Fat Loss:  Mild body fat loss Orbital   Muscle Mass Loss:  Mild muscle mass loss Clavicles (pectoralis & deltoids), Temples (temporalis)  Fluid Accumulation:  Mild Extremities   Strength:  Not Performed    Nutrition Assessment:     Pt. moderately malnourished AEB criteria as listed above.  At risk for further nutrition compromise r/t admit with chronic atrophic pancreatitis, chronic abdominal pain, chronic diarrhea, nausea/vomiting, recent H pylori gastritis-treatment completed, ? Gastroparesis per [place] notes, left shoulder/neck pain, dysphagia, leukopenia with severe neutropenia, pain medication seeking behavior, chest pain, anxiety, and underlying medical condition (hx: dysautonomia, adrenal insufficiency, myositis).        Nutrition Related Findings:    Pt. Report/Treatments/Miscellaneous: Patient seen, reports still having abdominal pain, nausea, diarrhea after  malnutrition assessment    Nutrition Interventions:   Food and/or Nutrient Delivery: Continue Current Diet, Modify Oral Nutrition Supplement  Nutrition Education/Counseling: Education completed (Pancreatitis education provided verbal and written 10/7/24)  Coordination of Nutrition Care: Continue to monitor while inpatient, Speech Therapy       Goals:     Goals: PO intake 75% or greater, by next RD assessment       Nutrition Monitoring and Evaluation:      Food/Nutrient Intake Outcomes: Diet Advancement/Tolerance, Food and Nutrient Intake, Supplement Intake  Physical Signs/Symptoms Outcomes: Biochemical Data, Chewing or Swallowing, GI Status, Fluid Status or Edema, Nutrition Focused Physical Findings    Discharge Planning:    Too soon to determine     Allison C Schwab, RD, LD  Contact: [phone]

## 2024-10-10 NOTE — PROGRESS NOTES
Hospitalist Progress Note      Patient:  Dixon Conn 34 y.o. 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 GI has followed and optimized pancreatic enzymes and diet, PPI, bentyl; they recommend transfer to tertiary center for additional specialize support. Initial contact made with [facility] over the weekend, but [facility] requested we re-contact on 10/7/24 for a pancreatic specialist to be the accepting physician. Await word from [facility] on location ([facility] vs [facility]) depending on whether specialist travels to [facility]. If the former, anticipate discharge later today, otherwise would be tomorrow at the earliest. In the meantime we will continue all medications as currently ordered.  10/8/24: continue medications as ordered, pending transfer to [facility]  10/9/2024: Patient has been accepted at [facility], reviewing bed availability again later this afternoon.  Okay to discharge to [facility] when bed becomes available.  10/10/24 called [facility] for update still no beds this morning will call again this afternoon to touch base.  Leukopenia with neutropenia- with neutropenia ANC < 500, on neutropenic precautions. There is high suspicion that the high dose IV benadryl is contributing to the drop, so it is held at this time. Will recheck CBC with diff on 10/8 - if level rises enough we can consider restarting but trying for less frequent doses (pt notes dose threshold dependence - not effective at 25mg); main symptomatic complaint necessitating the usage is nausea rather than pruritus. Pt confirms reaction to Fentanyl, so we will not change the opiate at this time since it appears to be the best tolerated. Anticipate [facility] specialists to follow up on this active problem to either further

## 2024-10-10 NOTE — PROGRESS NOTES
Physician Progress Note      PATIENT:               RAFFI BASS  CSN #:                  [id number]  :                       1990  ADMIT DATE:       10/3/2024 6:10 PM  DISCH DATE:  RESPONDING  PROVIDER #:        Gene Alicia MD          QUERY TEXT:    Patient admitted with acute on chronic pancreatitis. Noted to have moderate   malnutrition per dietician assessment. If possible, please document in   progress notes and discharge summary if you are evaluating and /or treating   any of the following:    The medical record reflects the following:  Risk Factors: chronic illness  Clinical Indicators: ASPEN criteria met for moderate malnutrition: Energy   Intake:  75% or less estimated energy requirements for 1 month or longer  Weight Loss:  No significant weight loss  Body Fat Loss:  Mild body fat loss Orbital  Muscle Mass Loss:  Mild muscle mass loss Clavicles (pectoralis & deltoids),   Temples (temporalis)  Fluid Accumulation:  Mild Extremities  Treatment: Dietician consult, Ensure Clear TID    ASPEN Criteria:    https://aspenjournals.onlinelibrary.olea.com/doi/full/10.1177/285536446249125  5  Options provided:  -- Protein calorie malnutrition moderate  -- Other - I will add my own diagnosis  -- Disagree - Not applicable / Not valid  -- Disagree - Clinically unable to determine / Unknown  -- Refer to Clinical Documentation Reviewer    PROVIDER RESPONSE TEXT:    This patient has moderate protein calorie malnutrition.    Query created by: Lindsey Saucedo on 10/9/2024 9:16 AM      Electronically signed by:  Gene Alicia MD 10/10/2024 11:34 AM

## 2024-10-10 NOTE — CARE COORDINATION
10/10/24 3:00 PM     DISCHARGE ON GOING EVALUATION    [facility] day: 7  Location: Counts include 234 beds at the [facility]27/027 Reason for admit: Pancreatitis, unspecified pancreatitis type [K85.90]     Procedures: none    Imaging since last note: none    Barriers to Discharge: Hospitalist and GI following. Tylenol q6h. Refusing IM bentyl. SQ lovenox. Zenpep. IV morphine prn. IV protonix. IV compazine prn. Await [facility] bed.    PCP: Schoen, Ashley, PA  Readmission Risk Score: 12.1    Patient Goals/Plan/Treatment Preferences: From home w/ . Accepted to [facility], awaiting bed assignment.

## 2024-10-10 NOTE — PLAN OF CARE
Problem: Infection - Adult  Goal: Absence of infection during hospitalization  Outcome: Progressing  Flowsheets (Taken 10/10/2024 0149)  Absence of infection during hospitalization:   Assess and monitor for signs and symptoms of infection   Monitor lab/diagnostic results   Administer medications as ordered   Instruct and encourage patient and family to use good hand hygiene technique       Problem: Gastrointestinal - Adult  Goal: Minimal or absence of nausea and vomiting  Outcome: Progressing  Flowsheets (Taken 10/10/2024 0149)  Minimal or absence of nausea and vomiting:   Administer ordered antiemetic medications as needed   Provide nonpharmacologic comfort measures as appropriate   Advance diet as tolerated, if ordered       Problem: Pain  Goal: Verbalizes/displays adequate comfort level or baseline comfort level  Outcome: Progressing  Flowsheets (Taken 10/10/2024 0149)  Verbalizes/displays adequate comfort level or baseline comfort level:   Assess pain using appropriate pain scale   Encourage patient to monitor pain and request assistance   Administer analgesics based on type and severity of pain and evaluate response   Implement non-pharmacological measures as appropriate and evaluate response       Problem: Discharge Planning  Goal: Discharge to home or other facility with appropriate resources  Outcome: Progressing  Flowsheets (Taken 10/10/2024 0149)  Discharge to home or other facility with appropriate resources:   Identify barriers to discharge with patient and caregiver   Arrange for needed discharge resources and transportation as appropriate   Identify discharge learning needs (meds, wound care, etc)

## 2024-10-11 VITALS
TEMPERATURE: 98.1 F | RESPIRATION RATE: 16 BRPM | SYSTOLIC BLOOD PRESSURE: 104 MMHG | DIASTOLIC BLOOD PRESSURE: 61 MMHG | BODY MASS INDEX: 21.11 KG/M2 | OXYGEN SATURATION: 98 % | WEIGHT: 134.48 LBS | HEIGHT: 67 IN | HEART RATE: 87 BPM

## 2024-10-11 LAB
BACTERIA BLD AEROBE CULT: ABNORMAL
BACTERIA BLD AEROBE CULT: ABNORMAL
BASOPHILS ABSOLUTE: 0 THOU/MM3 (ref 0–0.1)
BASOPHILS NFR BLD AUTO: 0.7 %
DEPRECATED RDW RBC AUTO: 47.5 FL (ref 35–45)
EOSINOPHIL NFR BLD AUTO: 3.3 %
EOSINOPHILS ABSOLUTE: 0.1 THOU/MM3 (ref 0–0.4)
ERYTHROCYTE [DISTWIDTH] IN BLOOD BY AUTOMATED COUNT: 15.2 % (ref 11.5–14.5)
HCT VFR BLD AUTO: 40.9 % (ref 37–47)
HGB BLD-MCNC: 12.7 GM/DL (ref 12–16)
IMM GRANULOCYTES # BLD AUTO: 0 THOU/MM3 (ref 0–0.07)
IMM GRANULOCYTES NFR BLD AUTO: 0 %
LYMPHOCYTES ABSOLUTE: 1.6 THOU/MM3 (ref 1–4.8)
LYMPHOCYTES NFR BLD AUTO: 59.9 %
MCH RBC QN AUTO: 26.6 PG (ref 26–33)
MCHC RBC AUTO-ENTMCNC: 31.1 GM/DL (ref 32.2–35.5)
MCV RBC AUTO: 85.7 FL (ref 81–99)
MISC. #1 REFERENCE GROUP TEST: NORMAL
MONOCYTES ABSOLUTE: 0.2 THOU/MM3 (ref 0.4–1.3)
MONOCYTES NFR BLD AUTO: 7.8 %
NEUTROPHILS ABSOLUTE: 0.8 THOU/MM3 (ref 1.8–7.7)
NEUTROPHILS NFR BLD AUTO: 28.3 %
NRBC BLD AUTO-RTO: 0 /100 WBC
ORGANISM: ABNORMAL
PLATELET # BLD AUTO: 103 THOU/MM3 (ref 130–400)
PLATELET BLD QL SMEAR: ABNORMAL
PMV BLD AUTO: 12 FL (ref 9.4–12.4)
RBC # BLD AUTO: 4.77 MILL/MM3 (ref 4.2–5.4)
SCAN OF BLOOD SMEAR: NORMAL
VARIANT LYMPHS BLD QL SMEAR: ABNORMAL %
WBC # BLD AUTO: 2.7 THOU/MM3 (ref 4.8–10.8)

## 2024-10-11 PROCEDURE — 36415 COLL VENOUS BLD VENIPUNCTURE: CPT

## 2024-10-11 PROCEDURE — 85025 COMPLETE CBC W/AUTO DIFF WBC: CPT

## 2024-10-11 PROCEDURE — 2580000003 HC RX 258

## 2024-10-11 PROCEDURE — 6370000000 HC RX 637 (ALT 250 FOR IP): Performed by: NURSE PRACTITIONER

## 2024-10-11 PROCEDURE — 05H933Z INSERTION OF INFUSION DEVICE INTO RIGHT BRACHIAL VEIN, PERCUTANEOUS APPROACH: ICD-10-PCS | Performed by: HOSPITALIST

## 2024-10-11 PROCEDURE — 99239 HOSP IP/OBS DSCHRG MGMT >30: CPT | Performed by: HOSPITALIST

## 2024-10-11 PROCEDURE — 6360000002 HC RX W HCPCS: Performed by: STUDENT IN AN ORGANIZED HEALTH CARE EDUCATION/TRAINING PROGRAM

## 2024-10-11 PROCEDURE — 36410 VNPNXR 3YR/> PHY/QHP DX/THER: CPT

## 2024-10-11 PROCEDURE — 6360000002 HC RX W HCPCS

## 2024-10-11 PROCEDURE — C1751 CATH, INF, PER/CENT/MIDLINE: HCPCS

## 2024-10-11 PROCEDURE — 76937 US GUIDE VASCULAR ACCESS: CPT

## 2024-10-11 PROCEDURE — 6370000000 HC RX 637 (ALT 250 FOR IP): Performed by: STUDENT IN AN ORGANIZED HEALTH CARE EDUCATION/TRAINING PROGRAM

## 2024-10-11 RX ADMIN — PANTOPRAZOLE SODIUM 40 MG: 40 INJECTION, POWDER, FOR SOLUTION INTRAVENOUS at 08:33

## 2024-10-11 RX ADMIN — MORPHINE SULFATE 2 MG: 2 INJECTION, SOLUTION INTRAMUSCULAR; INTRAVENOUS at 08:41

## 2024-10-11 RX ADMIN — PANCRELIPASE LIPASE, PANCRELIPASE PROTEASE, PANCRELIPASE AMYLASE 80000 UNITS: 20000; 63000; 84000 CAPSULE, DELAYED RELEASE ORAL at 08:30

## 2024-10-11 RX ADMIN — SODIUM CHLORIDE, PRESERVATIVE FREE 10 ML: 5 INJECTION INTRAVENOUS at 08:28

## 2024-10-11 RX ADMIN — BUSPIRONE HYDROCHLORIDE 10 MG: 10 TABLET ORAL at 08:30

## 2024-10-11 RX ADMIN — ACETAMINOPHEN 650 MG: 325 TABLET ORAL at 06:26

## 2024-10-11 RX ADMIN — MORPHINE SULFATE 2 MG: 2 INJECTION, SOLUTION INTRAMUSCULAR; INTRAVENOUS at 06:28

## 2024-10-11 RX ADMIN — ENOXAPARIN SODIUM 40 MG: 100 INJECTION SUBCUTANEOUS at 08:29

## 2024-10-11 RX ADMIN — MORPHINE SULFATE 2 MG: 2 INJECTION, SOLUTION INTRAMUSCULAR; INTRAVENOUS at 02:30

## 2024-10-11 ASSESSMENT — PAIN DESCRIPTION - PAIN TYPE: TYPE: ACUTE PAIN;CHRONIC PAIN

## 2024-10-11 ASSESSMENT — PAIN SCALES - GENERAL: PAINLEVEL_OUTOF10: 8

## 2024-10-11 ASSESSMENT — PAIN DESCRIPTION - ORIENTATION: ORIENTATION: UPPER

## 2024-10-11 ASSESSMENT — PAIN DESCRIPTION - ONSET: ONSET: ON-GOING

## 2024-10-11 ASSESSMENT — PAIN DESCRIPTION - LOCATION: LOCATION: ABDOMEN

## 2024-10-11 ASSESSMENT — PAIN - FUNCTIONAL ASSESSMENT: PAIN_FUNCTIONAL_ASSESSMENT: ACTIVITIES ARE NOT PREVENTED

## 2024-10-11 ASSESSMENT — PAIN DESCRIPTION - FREQUENCY: FREQUENCY: CONTINUOUS

## 2024-10-11 ASSESSMENT — PAIN DESCRIPTION - DESCRIPTORS: DESCRIPTORS: SHARP

## 2024-10-11 NOTE — DISCHARGE SUMMARY
Discharge Summary    Patient:  Dixon Conn  YOB: 1990    MRN: [id number]   Acct: [id number]    Primary Care Physician: Schoen, Ashley, PA    Admit date:  10/3/2024    Discharge date:   10/11/2024       Discharge Diagnoses:   Pancreatitis, unspecified pancreatitis type  Principal Problem:    Pancreatitis, unspecified pancreatitis type  Active Problems:    Acute pain of left shoulder    Dysphagia    Leukopenia    Dysautonomia (HCC)    Moderate malnutrition (HCC)  Resolved Problems:    * No resolved hospital problems. *        Admitted for: (HPI)  Dixon Conn is a 34 y.o. female with PMHx of Chronic pancreatitis, Dysautonomia who presented to [facility] with chief complaint of headache and abdominal pain. Patient arrived as a direct admission from [facility]. Patient reports that she felt well for about 2 days after discharge (on 9/27). States she went out to eat with some friends for her birthday and ate some sushi and thinks it may have caused another flare up. Reports epigastric pain radiating into her back. Reports associated nausea, vomiting and diarrhea. Reports a headache currently. Also states she is very anxious and itchy. Otherwise denies symptoms. Denies dizziness, or lightheadedness. Denies chest pain or SOB. Denies dysuria or hematuria.      Hospital Course:  34-year-old female with multiple admissions has been to multiple hospitals for intractable abdominal pain secondary to acute on chronic autoimmune pancreatitis.  Patient had been evaluated by GI, despite multiple admissions with fluid hydration pain control patient's symptoms are ongoing.  She also developed leukopenia which has slowly been improving.  Patient is being transferred to tertiary care center per GIs recommendations.    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  L Trapezius myositis - minor skin breakdown from excoration so on Bactrim at this time; imaging done confirms no abscess/fluid collection  Dysphagia - imaging confirms mild enlargement of adenoids, pt tolerating modified diet  Medication seeking behavior - documented from interactions with other physicians, including psychiatrist Dr. Harvey. I did speak at length with the patient about the nature of patients with chronic pain-causing diseases to have an understandable focus on the management of their primary symptom, pain. I did attempt to explain my management position wherein I want her to be comfortable, but if there is ever a conflict between side effects of symptom-managing medications and overall health (such as the concern for leukopenia from the IV high dose benadryl) that my focus will be on maintaining her health, even if that means withholding symptom-managing medications for a time. Ultimately, I believe she would be well served with a developed management plan so she does need to advocate for herself and instead would know we have a consistent plan that doesn't vary from [id number]. For now, our main focus is on transferring her to a tertiary center so they can further manage her condition. Her outpatient specialists will likely serve a primary role in helping develop the management plan.  Consultants:  Patient Care Team:  Schoen, Ashley, PA as PCP - General (Physician Assistant)    Discharge Medications:       Medication List        CONTINUE taking these medications      lipase-protease-amylase 14100-24391 units delayed release capsule  Commonly known as: CREON     loperamide 2 MG capsule  Commonly known as: IMODIUM     pantoprazole 40 MG tablet  Commonly known as: PROTONIX  Take 1 tablet by mouth in the morning and at bedtime                Physical Exam:    Vitals:  Vitals:    10/10/24 2040 10/10/24 2325 10/11/24 6037

## 2024-10-11 NOTE — CARE COORDINATION
10/11/24, 12:27 PM EDT    Patient goals/plan/ treatment preferences discussed by  and .  Patient goals/plan/ treatment preferences reviewed with patient/ family.  Patient/ family verbalize understanding of discharge plan and are in agreement with goal/plan/treatment preferences.  Understanding was demonstrated using the teach back method.  AVS provided by RN at time of discharge, which includes all necessary medical information pertaining to the patients current course of illness, treatment, post-discharge goals of care, and treatment preferences.     Services At/After Discharge: Acute Hospital -[facility]

## 2024-10-11 NOTE — PLAN OF CARE
Problem: Infection - Adult  Goal: Absence of infection during hospitalization  10/11/2024 0148 by Mary Pinedo RN  Outcome: Progressing  Flowsheets (Taken 10/11/2024 0148)  Absence of infection during hospitalization:   Assess and monitor for signs and symptoms of infection   Monitor lab/diagnostic results   Administer medications as ordered   Instruct and encourage patient and family to use good hand hygiene technique   Identify and instruct in appropriate isolation precautions for identified infection/condition       Problem: Gastrointestinal - Adult  Goal: Minimal or absence of nausea and vomiting  10/11/2024 0148 by Mary Pinedo RN  Outcome: Progressing  Flowsheets (Taken 10/11/2024 0148)  Minimal or absence of nausea and vomiting:   Administer ordered antiemetic medications as needed   Provide nonpharmacologic comfort measures as appropriate   Advance diet as tolerated, if ordered       Problem: Pain  Goal: Verbalizes/displays adequate comfort level or baseline comfort level  10/11/2024 0148 by Mary Pinedo RN  Outcome: Progressing  Flowsheets (Taken 10/11/2024 0148)  Verbalizes/displays adequate comfort level or baseline comfort level:   Encourage patient to monitor pain and request assistance   Assess pain using appropriate pain scale   Administer analgesics based on type and severity of pain and evaluate response   Implement non-pharmacological measures as appropriate and evaluate response       Problem: Discharge Planning  Goal: Discharge to home or other facility with appropriate resources  10/11/2024 0148 by Mary Pinedo RN  Outcome: Progressing  Flowsheets (Taken 10/11/2024 0148)  Discharge to home or other facility with appropriate resources:   Identify discharge learning needs (meds, wound care, etc)   Arrange for needed discharge resources and transportation as appropriate   Identify barriers to discharge with patient and caregiver

## 2024-10-11 NOTE — PROGRESS NOTES
Patient leaving in stable condition via EMS at this time. 2mg dose of morphine given per doctor before transport. Medical records given to EMS. Patient has no further questions of concerns.

## 2024-10-11 NOTE — PROGRESS NOTES
PowerGlide Insertion Procedure Note  This line is NOT a central line, please do not use this for central solutions.   Line can be removed by the primary nurse or LPN, but should not be removed by the tech.  Line can be used to collect labs, but is NOT guaranteed to draw blood the entire duration of dwell.   Cathflo CANNOT be used on this device.   Patient CAN discharge home/to SNF with this device if ordered.   PowerGlide device can dwell for up to 29 days.     Dixon Conn   Admitted- 10/3/2024  6:10 PM  Admission diagnosis- Pancreatitis, unspecified pancreatitis type [K85.90]      Attending Physician- Gene Alicia MD    Indication for Insertion: Poor Vascular Access    Catheter Insertion Date- 10/11/2024   Catheter Brand-[employer]  Lot Number- [id number]  Gauge-20g  Lumen-single    Insertion Site- FARRAH Brachial  Vein Diameter- 1.24 mm  Catheter Length- 10 cm  Internal Length- 10 cm     Midline Tip Terminates in the Axillary- Yes  Upper Arm Circumference- 26cm  Easy insertion- Yes  Able to Aspirate blood- Yes  Easy Flush- Yes    Midline insertion successful- Yes  Ultrasound- yes    Okay To Use Midline- Yes      Electronically signed by Johnathon Hutchison, RN, RN on 10/11/2024 at 8:48 AM

## 2024-10-12 LAB
BACTERIA BLD AEROBE CULT: NORMAL
BACTERIA BLD AEROBE CULT: NORMAL
CALPROTECTIN STL-MCNT: 89 UG/G
ELASTASE PANC STL-MCNT: 452 UG/G

## 2024-12-26 ENCOUNTER — HOSPITAL ENCOUNTER (EMERGENCY)
Age: 34
Discharge: HOME | End: 2024-12-26
Payer: OTHER GOVERNMENT

## 2024-12-26 VITALS
SYSTOLIC BLOOD PRESSURE: 110 MMHG | OXYGEN SATURATION: 100 % | HEART RATE: 80 BPM | RESPIRATION RATE: 12 BRPM | DIASTOLIC BLOOD PRESSURE: 71 MMHG | TEMPERATURE: 98.3 F

## 2024-12-26 DIAGNOSIS — J32.1 CHRONIC FRONTAL SINUSITIS: Primary | ICD-10-CM

## 2024-12-26 PROCEDURE — 99282 EMERGENCY DEPT VISIT SF MDM: CPT

## 2024-12-26 ASSESSMENT — PAIN - FUNCTIONAL ASSESSMENT: PAIN_FUNCTIONAL_ASSESSMENT: NONE - DENIES PAIN

## 2024-12-26 NOTE — ED PROVIDER NOTES
Triage Chief Complaint:   Abdominal Pain and Headache (Pt reports being seen at another hospital and was told her sinuses were completely blocked. Pt was diagnosed with Covid and has a headache and abd pain. Reports other hospital sent her here because they were unable to get an IV and get her pain meds. Pt also reports having a tumor. )    [place]:  Today in the ED I had the pleasure of caring for Dixon Conn who is a 34 y.o. female that presents today to the ED for evaluation.     For congestion, headache, epigastric abdominal pain, (pt does have hx of chronic pancreatitis), n/v/d,. These sx have been ongoing x 4 days. Pt ws Dx with sinusitis.     Pt is recently Dx with covid.     Last night pt endorses increased mucous production and is having difficulty breathing due to mucus.     She went to the hospital, Had a CT of her head which showed    1.  Pansinus disease which is most prominent in the right maxillary sinus as detailed above.  2.  Bilateral maxillary sinus antrectomies with patency of the maxillary sinus drainage pathways.  3.  Obstruction of the bilateral frontal and left sphenoid recesses.  4.  Mild rightward nasal septal deviation.         ROS:  REVIEW OF SYSTEMS    At least 10 systems reviewed      All other review of systems are negative  See HPI and nursing notes for additional information       No past medical history on file.  Past Surgical History:   Procedure Laterality Date    UPPER GASTROINTESTINAL ENDOSCOPY  9/11/2024    ESOPHAGOGASTRODUODENOSCOPY BIOPSY performed by Endy Porter MD at [facility] ENDOSCOPY    UPPER GASTROINTESTINAL ENDOSCOPY N/A 9/12/2024    Celiac Block Injection performed by Marika Vergara MD at [facility] ENDOSCOPY    UPPER GASTROINTESTINAL ENDOSCOPY Left 9/12/2024    ENDOSCOPIC ULTRASOUND performed by Marika Vergara MD at [facility] ENDOSCOPY     No family history on file.  Social History     Socioeconomic History    Marital status:      Spouse name: Not on file

## 2024-12-27 NOTE — DISCHARGE INSTRUCTIONS
As we discussed. If you believe you would like to be seen in a facility with ENT coverage, I suggest you go to  a facility such as [facility] or [facility] where there is ENT.

## 2025-09-02 ENCOUNTER — HOSPITAL ENCOUNTER (EMERGENCY)
Age: 35
Discharge: HOME | End: 2025-09-02
Attending: EMERGENCY MEDICINE
Payer: OTHER GOVERNMENT

## 2025-09-02 VITALS
BODY MASS INDEX: 21.97 KG/M2 | TEMPERATURE: 98.6 F | HEIGHT: 67 IN | RESPIRATION RATE: 18 BRPM | HEART RATE: 92 BPM | SYSTOLIC BLOOD PRESSURE: 100 MMHG | OXYGEN SATURATION: 98 % | WEIGHT: 140 LBS | DIASTOLIC BLOOD PRESSURE: 68 MMHG

## 2025-09-02 DIAGNOSIS — R21 RASH AND OTHER NONSPECIFIC SKIN ERUPTION: Primary | ICD-10-CM

## 2025-09-02 DIAGNOSIS — G89.29 CHRONIC ABDOMINAL PAIN: ICD-10-CM

## 2025-09-02 DIAGNOSIS — R10.9 CHRONIC ABDOMINAL PAIN: ICD-10-CM

## 2025-09-02 LAB
CK SERPL-CCNC: 79 U/L (ref 26–192)
POTASSIUM SERPL-SCNC: 3.1 MMOL/L (ref 3.5–5.1)

## 2025-09-02 PROCEDURE — 82550 ASSAY OF CK (CPK): CPT

## 2025-09-02 PROCEDURE — 96375 TX/PRO/DX INJ NEW DRUG ADDON: CPT

## 2025-09-02 PROCEDURE — 84132 ASSAY OF SERUM POTASSIUM: CPT

## 2025-09-02 PROCEDURE — 99284 EMERGENCY DEPT VISIT MOD MDM: CPT

## 2025-09-02 PROCEDURE — 96374 THER/PROPH/DIAG INJ IV PUSH: CPT

## 2025-09-02 PROCEDURE — 6360000002 HC RX W HCPCS: Performed by: EMERGENCY MEDICINE

## 2025-09-02 PROCEDURE — 6370000000 HC RX 637 (ALT 250 FOR IP): Performed by: EMERGENCY MEDICINE

## 2025-09-02 RX ORDER — DIAZEPAM 10 MG/2ML
5 INJECTION, SOLUTION INTRAMUSCULAR; INTRAVENOUS ONCE
Status: COMPLETED | OUTPATIENT
Start: 2025-09-02 | End: 2025-09-02

## 2025-09-02 RX ORDER — DIPHENHYDRAMINE HYDROCHLORIDE 50 MG/ML
50 INJECTION, SOLUTION INTRAMUSCULAR; INTRAVENOUS ONCE
Status: COMPLETED | OUTPATIENT
Start: 2025-09-02 | End: 2025-09-02

## 2025-09-02 RX ADMIN — POTASSIUM BICARBONATE 40 MEQ: 782 TABLET, EFFERVESCENT ORAL at 23:14

## 2025-09-02 RX ADMIN — DIPHENHYDRAMINE HYDROCHLORIDE 50 MG: 50 INJECTION INTRAMUSCULAR; INTRAVENOUS at 22:34

## 2025-09-02 RX ADMIN — DIAZEPAM 5 MG: 5 INJECTION, SOLUTION INTRAMUSCULAR; INTRAVENOUS at 22:34

## 2025-09-02 ASSESSMENT — PAIN SCALES - GENERAL: PAINLEVEL_OUTOF10: 8

## 2025-09-02 ASSESSMENT — PAIN - FUNCTIONAL ASSESSMENT: PAIN_FUNCTIONAL_ASSESSMENT: 0-10

## (undated) DEVICE — NEEDLE ASPIR 22 GAX0-8 CM 1.65X2.4 MM SHTH EXPECT SLM LN